# Patient Record
Sex: MALE | Race: WHITE | NOT HISPANIC OR LATINO | Employment: FULL TIME | URBAN - METROPOLITAN AREA
[De-identification: names, ages, dates, MRNs, and addresses within clinical notes are randomized per-mention and may not be internally consistent; named-entity substitution may affect disease eponyms.]

---

## 2024-02-18 RX ORDER — MELOXICAM 15 MG/1
15 TABLET ORAL
Qty: 20 TABLET | Refills: 0 | OUTPATIENT
Start: 2024-02-18

## 2024-02-18 RX ORDER — METHOCARBAMOL 750 MG/1
750 TABLET, FILM COATED ORAL 2 TIMES DAILY PRN
Qty: 30 TABLET | Refills: 0 | OUTPATIENT
Start: 2024-02-18

## 2024-06-09 RX ORDER — GABAPENTIN 300 MG/1
300 CAPSULE ORAL 3 TIMES DAILY
Qty: 90 CAPSULE | Refills: 0 | Status: SHIPPED | OUTPATIENT
Start: 2024-06-09

## 2024-08-07 ENCOUNTER — ANESTHESIA EVENT (OUTPATIENT)
Dept: SURGERY | Facility: HOSPITAL | Age: 59
End: 2024-08-07
Payer: COMMERCIAL

## 2024-08-07 RX ORDER — QUETIAPINE FUMARATE 300 MG/1
300 TABLET, FILM COATED ORAL NIGHTLY
COMMUNITY

## 2024-08-07 RX ORDER — ATORVASTATIN CALCIUM 40 MG/1
1 TABLET, FILM COATED ORAL NIGHTLY
COMMUNITY
Start: 2024-05-02 | End: 2025-05-02

## 2024-08-07 RX ORDER — DIAZEPAM 10 MG/1
10 TABLET ORAL
COMMUNITY
Start: 2024-05-06

## 2024-08-07 RX ORDER — VENLAFAXINE HYDROCHLORIDE 150 MG/1
300 CAPSULE, EXTENDED RELEASE ORAL DAILY
COMMUNITY
Start: 2023-12-07

## 2024-08-07 RX ORDER — CYCLOBENZAPRINE HCL 10 MG
10 TABLET ORAL DAILY
COMMUNITY
Start: 2024-06-04

## 2024-08-07 RX ORDER — TAMSULOSIN HYDROCHLORIDE 0.4 MG/1
1 CAPSULE ORAL EVERY MORNING
COMMUNITY
Start: 2024-02-20

## 2024-08-07 RX ORDER — LISINOPRIL AND HYDROCHLOROTHIAZIDE 20; 25 MG/1; MG/1
1 TABLET ORAL DAILY
COMMUNITY
Start: 2024-02-20

## 2024-08-07 RX ORDER — TRAMADOL HYDROCHLORIDE 50 MG/1
50 TABLET ORAL
COMMUNITY
Start: 2024-06-17

## 2024-08-09 ENCOUNTER — HOSPITAL ENCOUNTER (OUTPATIENT)
Dept: RADIOLOGY | Facility: HOSPITAL | Age: 59
Discharge: HOME | End: 2024-08-09
Attending: NEUROLOGICAL SURGERY
Payer: COMMERCIAL

## 2024-08-09 DIAGNOSIS — Z01.818 PRE-OP EXAMINATION: ICD-10-CM

## 2024-08-09 PROCEDURE — 71046 X-RAY EXAM CHEST 2 VIEWS: CPT | Mod: TC

## 2024-08-14 ENCOUNTER — ANESTHESIA (OUTPATIENT)
Dept: SURGERY | Facility: HOSPITAL | Age: 59
End: 2024-08-14
Payer: COMMERCIAL

## 2024-08-16 NOTE — PRE-PROCEDURE INSTRUCTIONS
[facility]: Outpatient Surgery   Preprocedure Check-In Instructions       Your arrival time for your surgery or procedure is 7:30am.     We ask patients to arrive about 2 hours before surgery to allow for enough time to review your health history & medications, start your IV, complete any outstanding labwork or tests, and meet your Anesthesiologist.     You will arrive at [facility], [address] [place] [address]. Enter through the [place] entrance next to the Emergency Room, and come to the 6th floor to the Outpatient Surgery Department. If you need a wheelchair, please call [phone] for an attendant to meet you at the [place] entrance with a wheelchair.    Wait Times:  Due to inconsistent procedure completion times, an unexpected wait may occur.  The physicians would like you to be here in the event they run ahead of time.  We will keep you comfortable and informed while you are waiting.  We apologize in advance if this happens.    Visitory Policy:   You are allowed 2 adult visitors to be with you in the hospital. All hospital visitors should be in good current health. No small children.     What to Bring:   Please have your ID, insurance cards, and all home medication bottles with you at check in. Bring your CPAP machine if one is used at home.     Fasting:   Nothing to eat or drink after midnight the night before your procedure. This includes no ice, gum, hard candies, and/or tobacco products.   Follow your doctor's instructions for taking any medications on the morning of your procedure. If no instructions for taking medications were given, do not take any medications but bring your medications in their bottles to your procedure check in.     Follow your doctor's preoperative instructions regarding skin prep, bowel prep, bathing, or showering prior to your procedure. If any special soaps were provided to you, please use according to your doctor's instructions.  If no instructions were given from your doctor, take a good bath or shower with antibacterial soap the night before and the morning of your procedure. On the morning of procedure, wear loose, comfortable clothing. No lotions, makeup, perfumes, colognes, deodorant, or jewelry to your procedure. Removable items (glasses, contact lenses, dentures, retainers, hearing aids) need to be removed for your procedure. Bring your storage containers for these items if you wear them.     Artificial nails, body jewelry, eyelash extensions, and/or hair extensions with metal clips are not allowed during your surgery. If you currently wear any of these items, please arrange for them to be removed prior to your arrival to the hospital.     Outpatient or Same Day Surgeries:   Any patients receiving sedation/anesthesia are advised not to drive for 24 hours after their procedure. We do not allow patients to drive themselves home after discharge. If you are going home after your procedure, please have someone available to drive you home from the hospital.     You may call the Outpatient Surgery Department at [phone] with any questions or concerns. We are looking forward to meeting you and taking great care of you for your procedure. Thank you for choosing [facility] for your surgical needs.       Status: complete  Spoke with: patient  Call Time: 1058

## 2024-08-19 ENCOUNTER — HOSPITAL ENCOUNTER (INPATIENT)
Facility: HOSPITAL | Age: 59
LOS: 16 days | Discharge: NURSING, REHAB OR LONG-TERM CARE | DRG: 455 | End: 2024-09-04
Attending: NEUROLOGICAL SURGERY | Admitting: NEUROLOGICAL SURGERY
Payer: COMMERCIAL

## 2024-08-19 DIAGNOSIS — M47.27 LUMBOSACRAL RADICULOPATHY DUE TO DEGENERATIVE JOINT DISEASE OF SPINE: Primary | ICD-10-CM

## 2024-08-19 DIAGNOSIS — M54.18 RADICULOPATHY OF SACRAL REGION: ICD-10-CM

## 2024-08-19 PROBLEM — M51.16 NEURITIS OR RADICULITIS DUE TO RUPTURE OF LUMBAR INTERVERTEBRAL DISC: Status: ACTIVE | Noted: 2024-08-19

## 2024-08-19 PROBLEM — M47.26 OTHER SPONDYLOSIS WITH RADICULOPATHY, LUMBAR REGION: Status: ACTIVE | Noted: 2024-08-19

## 2024-08-19 LAB
ABORH RETYPE: NORMAL
BASOPHILS # BLD AUTO: 0.02 X10(3)/MCL
BASOPHILS NFR BLD AUTO: 0.3 %
EOSINOPHIL # BLD AUTO: 0.09 X10(3)/MCL (ref 0–0.9)
EOSINOPHIL NFR BLD AUTO: 1.5 %
ERYTHROCYTE [DISTWIDTH] IN BLOOD BY AUTOMATED COUNT: 11.9 % (ref 11.5–17)
GROUP & RH: NORMAL
HCT VFR BLD AUTO: 42.3 % (ref 42–52)
HGB BLD-MCNC: 14.9 G/DL (ref 14–18)
IMM GRANULOCYTES # BLD AUTO: 0.03 X10(3)/MCL (ref 0–0.04)
IMM GRANULOCYTES NFR BLD AUTO: 0.5 %
INDIRECT COOMBS: NORMAL
LYMPHOCYTES # BLD AUTO: 1.17 X10(3)/MCL (ref 0.6–4.6)
LYMPHOCYTES NFR BLD AUTO: 18.9 %
MCH RBC QN AUTO: 30.9 PG (ref 27–31)
MCHC RBC AUTO-ENTMCNC: 35.2 G/DL (ref 33–36)
MCV RBC AUTO: 87.8 FL (ref 80–94)
MONOCYTES # BLD AUTO: 0.64 X10(3)/MCL (ref 0.1–1.3)
MONOCYTES NFR BLD AUTO: 10.3 %
NEUTROPHILS # BLD AUTO: 4.25 X10(3)/MCL (ref 2.1–9.2)
NEUTROPHILS NFR BLD AUTO: 68.5 %
NRBC BLD AUTO-RTO: 0 %
PLATELET # BLD AUTO: 197 X10(3)/MCL (ref 130–400)
PMV BLD AUTO: 9.5 FL (ref 7.4–10.4)
RBC # BLD AUTO: 4.82 X10(6)/MCL (ref 4.7–6.1)
SPECIMEN OUTDATE: NORMAL
WBC # BLD AUTO: 6.2 X10(3)/MCL (ref 4.5–11.5)

## 2024-08-19 PROCEDURE — 27201423 OPTIME MED/SURG SUP & DEVICES STERILE SUPPLY: Performed by: NEUROLOGICAL SURGERY

## 2024-08-19 PROCEDURE — 71000033 HC RECOVERY, INTIAL HOUR: Performed by: NEUROLOGICAL SURGERY

## 2024-08-19 PROCEDURE — 25000003 PHARM REV CODE 250: Performed by: NURSE ANESTHETIST, CERTIFIED REGISTERED

## 2024-08-19 PROCEDURE — 37000008 HC ANESTHESIA 1ST 15 MINUTES: Performed by: NEUROLOGICAL SURGERY

## 2024-08-19 PROCEDURE — 36000712 HC OR TIME LEV V 1ST 15 MIN: Performed by: NEUROLOGICAL SURGERY

## 2024-08-19 PROCEDURE — 01NB0ZZ RELEASE LUMBAR NERVE, OPEN APPROACH: ICD-10-PCS | Performed by: NEUROLOGICAL SURGERY

## 2024-08-19 PROCEDURE — 86901 BLOOD TYPING SEROLOGIC RH(D): CPT | Performed by: NEUROLOGICAL SURGERY

## 2024-08-19 PROCEDURE — 11000001 HC ACUTE MED/SURG PRIVATE ROOM

## 2024-08-19 PROCEDURE — 27000221 HC OXYGEN, UP TO 24 HOURS

## 2024-08-19 PROCEDURE — 63600175 PHARM REV CODE 636 W HCPCS: Performed by: ANESTHESIOLOGY

## 2024-08-19 PROCEDURE — 63600175 PHARM REV CODE 636 W HCPCS: Performed by: NEUROLOGICAL SURGERY

## 2024-08-19 PROCEDURE — 25000003 PHARM REV CODE 250

## 2024-08-19 PROCEDURE — C1713 ANCHOR/SCREW BN/BN,TIS/BN: HCPCS | Performed by: NEUROLOGICAL SURGERY

## 2024-08-19 PROCEDURE — 86850 RBC ANTIBODY SCREEN: CPT | Performed by: NEUROLOGICAL SURGERY

## 2024-08-19 PROCEDURE — 63600175 PHARM REV CODE 636 W HCPCS: Performed by: NURSE ANESTHETIST, CERTIFIED REGISTERED

## 2024-08-19 PROCEDURE — 36000713 HC OR TIME LEV V EA ADD 15 MIN: Performed by: NEUROLOGICAL SURGERY

## 2024-08-19 PROCEDURE — 25000003 PHARM REV CODE 250: Performed by: NEUROLOGICAL SURGERY

## 2024-08-19 PROCEDURE — 36415 COLL VENOUS BLD VENIPUNCTURE: CPT | Performed by: NEUROLOGICAL SURGERY

## 2024-08-19 PROCEDURE — 63600175 PHARM REV CODE 636 W HCPCS

## 2024-08-19 PROCEDURE — 0SG30AJ FUSION OF LUMBOSACRAL JOINT WITH INTERBODY FUSION DEVICE, POSTERIOR APPROACH, ANTERIOR COLUMN, OPEN APPROACH: ICD-10-PCS | Performed by: NEUROLOGICAL SURGERY

## 2024-08-19 PROCEDURE — 0SG3071 FUSION OF LUMBOSACRAL JOINT WITH AUTOLOGOUS TISSUE SUBSTITUTE, POSTERIOR APPROACH, POSTERIOR COLUMN, OPEN APPROACH: ICD-10-PCS | Performed by: NEUROLOGICAL SURGERY

## 2024-08-19 PROCEDURE — 0SG10AJ FUSION OF 2 OR MORE LUMBAR VERTEBRAL JOINTS WITH INTERBODY FUSION DEVICE, POSTERIOR APPROACH, ANTERIOR COLUMN, OPEN APPROACH: ICD-10-PCS | Performed by: NEUROLOGICAL SURGERY

## 2024-08-19 PROCEDURE — 71000039 HC RECOVERY, EACH ADD'L HOUR: Performed by: NEUROLOGICAL SURGERY

## 2024-08-19 PROCEDURE — 85025 COMPLETE CBC W/AUTO DIFF WBC: CPT | Performed by: NEUROLOGICAL SURGERY

## 2024-08-19 PROCEDURE — 0SG1071 FUSION OF 2 OR MORE LUMBAR VERTEBRAL JOINTS WITH AUTOLOGOUS TISSUE SUBSTITUTE, POSTERIOR APPROACH, POSTERIOR COLUMN, OPEN APPROACH: ICD-10-PCS | Performed by: NEUROLOGICAL SURGERY

## 2024-08-19 PROCEDURE — 37000009 HC ANESTHESIA EA ADD 15 MINS: Performed by: NEUROLOGICAL SURGERY

## 2024-08-19 PROCEDURE — P9047 ALBUMIN (HUMAN), 25%, 50ML: HCPCS | Mod: JZ,JG | Performed by: NURSE ANESTHETIST, CERTIFIED REGISTERED

## 2024-08-19 DEVICE — FILLER BONE SYN 1CC PARTIC: Type: IMPLANTABLE DEVICE | Site: SPINE LUMBAR | Status: FUNCTIONAL

## 2024-08-19 RX ORDER — ALUMINUM HYDROXIDE, MAGNESIUM HYDROXIDE, AND SIMETHICONE 1200; 120; 1200 MG/30ML; MG/30ML; MG/30ML
30 SUSPENSION ORAL EVERY 4 HOURS PRN
Status: DISCONTINUED | OUTPATIENT
Start: 2024-08-19 | End: 2024-09-04 | Stop reason: HOSPADM

## 2024-08-19 RX ORDER — HYDROCODONE BITARTRATE AND ACETAMINOPHEN 10; 325 MG/1; MG/1
1 TABLET ORAL EVERY 4 HOURS PRN
Status: DISCONTINUED | OUTPATIENT
Start: 2024-08-19 | End: 2024-08-23

## 2024-08-19 RX ORDER — ATORVASTATIN CALCIUM 40 MG/1
40 TABLET, FILM COATED ORAL NIGHTLY
Status: DISCONTINUED | OUTPATIENT
Start: 2024-08-19 | End: 2024-09-04 | Stop reason: HOSPADM

## 2024-08-19 RX ORDER — DIAZEPAM 5 MG/1
10 TABLET ORAL
Status: DISCONTINUED | OUTPATIENT
Start: 2024-08-19 | End: 2024-08-22

## 2024-08-19 RX ORDER — MORPHINE SULFATE 4 MG/ML
4 INJECTION, SOLUTION INTRAMUSCULAR; INTRAVENOUS
Status: DISCONTINUED | OUTPATIENT
Start: 2024-08-19 | End: 2024-08-20

## 2024-08-19 RX ORDER — CALCIUM CARBONATE 200(500)MG
500 TABLET,CHEWABLE ORAL DAILY PRN
Status: DISCONTINUED | OUTPATIENT
Start: 2024-08-19 | End: 2024-09-04 | Stop reason: HOSPADM

## 2024-08-19 RX ORDER — BISACODYL 10 MG/1
10 SUPPOSITORY RECTAL DAILY
Status: DISCONTINUED | OUTPATIENT
Start: 2024-08-19 | End: 2024-09-04 | Stop reason: HOSPADM

## 2024-08-19 RX ORDER — PROPOFOL 10 MG/ML
VIAL (ML) INTRAVENOUS
Status: DISCONTINUED | OUTPATIENT
Start: 2024-08-19 | End: 2024-08-19

## 2024-08-19 RX ORDER — PROPOFOL 10 MG/ML
VIAL (ML) INTRAVENOUS CONTINUOUS PRN
Status: DISCONTINUED | OUTPATIENT
Start: 2024-08-19 | End: 2024-08-19

## 2024-08-19 RX ORDER — EPHEDRINE SULFATE 50 MG/ML
INJECTION, SOLUTION INTRAVENOUS
Status: DISCONTINUED | OUTPATIENT
Start: 2024-08-19 | End: 2024-08-19

## 2024-08-19 RX ORDER — VENLAFAXINE HYDROCHLORIDE 150 MG/1
300 CAPSULE, EXTENDED RELEASE ORAL DAILY
Status: DISCONTINUED | OUTPATIENT
Start: 2024-08-19 | End: 2024-09-04 | Stop reason: HOSPADM

## 2024-08-19 RX ORDER — LISINOPRIL AND HYDROCHLOROTHIAZIDE 20; 25 MG/1; MG/1
1 TABLET ORAL DAILY
Status: DISCONTINUED | OUTPATIENT
Start: 2024-08-19 | End: 2024-08-19

## 2024-08-19 RX ORDER — ACETAMINOPHEN 325 MG/1
650 TABLET ORAL EVERY 4 HOURS PRN
Status: DISCONTINUED | OUTPATIENT
Start: 2024-08-19 | End: 2024-09-04 | Stop reason: HOSPADM

## 2024-08-19 RX ORDER — ACETAMINOPHEN 325 MG/1
650 TABLET ORAL EVERY 6 HOURS PRN
Status: DISCONTINUED | OUTPATIENT
Start: 2024-08-19 | End: 2024-09-04 | Stop reason: HOSPADM

## 2024-08-19 RX ORDER — MUPIROCIN 20 MG/G
OINTMENT TOPICAL 2 TIMES DAILY
Status: DISPENSED | OUTPATIENT
Start: 2024-08-19 | End: 2024-08-21

## 2024-08-19 RX ORDER — CYCLOBENZAPRINE HCL 10 MG
10 TABLET ORAL 3 TIMES DAILY PRN
Status: DISCONTINUED | OUTPATIENT
Start: 2024-08-19 | End: 2024-09-04 | Stop reason: HOSPADM

## 2024-08-19 RX ORDER — QUETIAPINE FUMARATE 300 MG/1
300 TABLET, FILM COATED ORAL NIGHTLY
Status: DISCONTINUED | OUTPATIENT
Start: 2024-08-19 | End: 2024-09-04 | Stop reason: HOSPADM

## 2024-08-19 RX ORDER — ONDANSETRON HYDROCHLORIDE 2 MG/ML
INJECTION, SOLUTION INTRAVENOUS
Status: DISCONTINUED | OUTPATIENT
Start: 2024-08-19 | End: 2024-08-19

## 2024-08-19 RX ORDER — PROCHLORPERAZINE EDISYLATE 5 MG/ML
10 INJECTION INTRAMUSCULAR; INTRAVENOUS EVERY 6 HOURS PRN
Status: DISCONTINUED | OUTPATIENT
Start: 2024-08-19 | End: 2024-09-04 | Stop reason: HOSPADM

## 2024-08-19 RX ORDER — MORPHINE SULFATE 4 MG/ML
2 INJECTION, SOLUTION INTRAMUSCULAR; INTRAVENOUS
Status: DISCONTINUED | OUTPATIENT
Start: 2024-08-19 | End: 2024-08-22

## 2024-08-19 RX ORDER — ENOXAPARIN SODIUM 100 MG/ML
40 INJECTION SUBCUTANEOUS EVERY 24 HOURS
Status: DISCONTINUED | OUTPATIENT
Start: 2024-08-21 | End: 2024-09-04 | Stop reason: HOSPADM

## 2024-08-19 RX ORDER — DEXAMETHASONE SODIUM PHOSPHATE 4 MG/ML
INJECTION, SOLUTION INTRA-ARTICULAR; INTRALESIONAL; INTRAMUSCULAR; INTRAVENOUS; SOFT TISSUE
Status: DISCONTINUED | OUTPATIENT
Start: 2024-08-19 | End: 2024-08-19

## 2024-08-19 RX ORDER — METHOCARBAMOL 100 MG/ML
1000 INJECTION, SOLUTION INTRAMUSCULAR; INTRAVENOUS ONCE
Status: COMPLETED | OUTPATIENT
Start: 2024-08-19 | End: 2024-08-19

## 2024-08-19 RX ORDER — HYDROCODONE BITARTRATE AND ACETAMINOPHEN 5; 325 MG/1; MG/1
1 TABLET ORAL EVERY 4 HOURS PRN
Status: DISCONTINUED | OUTPATIENT
Start: 2024-08-19 | End: 2024-08-23

## 2024-08-19 RX ORDER — SODIUM CHLORIDE 9 MG/ML
INJECTION, SOLUTION INTRAVENOUS CONTINUOUS
Status: DISCONTINUED | OUTPATIENT
Start: 2024-08-19 | End: 2024-08-30

## 2024-08-19 RX ORDER — PHENYLEPHRINE HYDROCHLORIDE 10 MG/ML
INJECTION INTRAVENOUS
Status: DISCONTINUED | OUTPATIENT
Start: 2024-08-19 | End: 2024-08-19

## 2024-08-19 RX ORDER — FENTANYL CITRATE 50 UG/ML
INJECTION, SOLUTION INTRAMUSCULAR; INTRAVENOUS
Status: DISCONTINUED | OUTPATIENT
Start: 2024-08-19 | End: 2024-08-19

## 2024-08-19 RX ORDER — AMOXICILLIN 250 MG
2 CAPSULE ORAL 2 TIMES DAILY
Status: DISCONTINUED | OUTPATIENT
Start: 2024-08-19 | End: 2024-09-04 | Stop reason: HOSPADM

## 2024-08-19 RX ORDER — KETAMINE HYDROCHLORIDE 100 MG/ML
INJECTION, SOLUTION INTRAMUSCULAR; INTRAVENOUS
Status: DISCONTINUED | OUTPATIENT
Start: 2024-08-19 | End: 2024-08-19

## 2024-08-19 RX ORDER — HYDROCHLOROTHIAZIDE 25 MG/1
25 TABLET ORAL DAILY
Status: DISCONTINUED | OUTPATIENT
Start: 2024-08-19 | End: 2024-08-29

## 2024-08-19 RX ORDER — OXYCODONE AND ACETAMINOPHEN 10; 325 MG/1; MG/1
1 TABLET ORAL EVERY 4 HOURS PRN
Status: DISCONTINUED | OUTPATIENT
Start: 2024-08-19 | End: 2024-09-04 | Stop reason: HOSPADM

## 2024-08-19 RX ORDER — EPINEPHRINE 1 MG/ML
INJECTION, SOLUTION, CONCENTRATE INTRAVENOUS
Status: DISCONTINUED | OUTPATIENT
Start: 2024-08-19 | End: 2024-08-19

## 2024-08-19 RX ORDER — MIDAZOLAM HYDROCHLORIDE 1 MG/ML
INJECTION INTRAMUSCULAR; INTRAVENOUS
Status: DISCONTINUED | OUTPATIENT
Start: 2024-08-19 | End: 2024-08-19

## 2024-08-19 RX ORDER — ONDANSETRON 4 MG/1
4 TABLET, ORALLY DISINTEGRATING ORAL EVERY 6 HOURS PRN
Status: DISCONTINUED | OUTPATIENT
Start: 2024-08-19 | End: 2024-09-04 | Stop reason: HOSPADM

## 2024-08-19 RX ORDER — ONDANSETRON HYDROCHLORIDE 2 MG/ML
4 INJECTION, SOLUTION INTRAVENOUS DAILY PRN
Status: DISCONTINUED | OUTPATIENT
Start: 2024-08-19 | End: 2024-08-19 | Stop reason: HOSPADM

## 2024-08-19 RX ORDER — CEFAZOLIN SODIUM 2 G/50ML
2 SOLUTION INTRAVENOUS
Status: COMPLETED | OUTPATIENT
Start: 2024-08-19 | End: 2024-08-20

## 2024-08-19 RX ORDER — CEFAZOLIN SODIUM 2 G/50ML
2 SOLUTION INTRAVENOUS
Status: DISCONTINUED | OUTPATIENT
Start: 2024-08-19 | End: 2024-08-21

## 2024-08-19 RX ORDER — TAMSULOSIN HYDROCHLORIDE 0.4 MG/1
1 CAPSULE ORAL EVERY MORNING
Status: DISCONTINUED | OUTPATIENT
Start: 2024-08-19 | End: 2024-09-04 | Stop reason: HOSPADM

## 2024-08-19 RX ORDER — ACETAMINOPHEN 10 MG/ML
INJECTION, SOLUTION INTRAVENOUS
Status: DISCONTINUED | OUTPATIENT
Start: 2024-08-19 | End: 2024-08-19

## 2024-08-19 RX ORDER — HYDROMORPHONE HYDROCHLORIDE 2 MG/ML
0.5 INJECTION, SOLUTION INTRAMUSCULAR; INTRAVENOUS; SUBCUTANEOUS EVERY 5 MIN PRN
Status: DISCONTINUED | OUTPATIENT
Start: 2024-08-19 | End: 2024-08-19 | Stop reason: HOSPADM

## 2024-08-19 RX ORDER — METOPROLOL SUCCINATE 50 MG/1
50 TABLET, EXTENDED RELEASE ORAL DAILY
COMMUNITY

## 2024-08-19 RX ORDER — MORPHINE SULFATE 4 MG/ML
1 INJECTION, SOLUTION INTRAMUSCULAR; INTRAVENOUS
Status: DISCONTINUED | OUTPATIENT
Start: 2024-08-19 | End: 2024-08-22

## 2024-08-19 RX ORDER — LIDOCAINE HYDROCHLORIDE AND EPINEPHRINE 5; 5 MG/ML; UG/ML
INJECTION, SOLUTION INFILTRATION; PERINEURAL
Status: DISCONTINUED | OUTPATIENT
Start: 2024-08-19 | End: 2024-08-19 | Stop reason: HOSPADM

## 2024-08-19 RX ORDER — ROCURONIUM BROMIDE 10 MG/ML
INJECTION, SOLUTION INTRAVENOUS
Status: DISCONTINUED | OUTPATIENT
Start: 2024-08-19 | End: 2024-08-19

## 2024-08-19 RX ORDER — SODIUM CHLORIDE 0.9 % (FLUSH) 0.9 %
10 SYRINGE (ML) INJECTION
Status: DISCONTINUED | OUTPATIENT
Start: 2024-08-19 | End: 2024-08-19 | Stop reason: HOSPADM

## 2024-08-19 RX ORDER — GLUCAGON 1 MG
1 KIT INJECTION
Status: DISCONTINUED | OUTPATIENT
Start: 2024-08-19 | End: 2024-08-19 | Stop reason: HOSPADM

## 2024-08-19 RX ORDER — ALBUMIN HUMAN 250 G/1000ML
SOLUTION INTRAVENOUS
Status: DISCONTINUED | OUTPATIENT
Start: 2024-08-19 | End: 2024-08-19

## 2024-08-19 RX ORDER — LISINOPRIL 20 MG/1
20 TABLET ORAL DAILY
Status: DISCONTINUED | OUTPATIENT
Start: 2024-08-19 | End: 2024-08-29

## 2024-08-19 RX ADMIN — EPHEDRINE SULFATE 10 MG: 50 INJECTION INTRAVENOUS at 10:08

## 2024-08-19 RX ADMIN — DEXAMETHASONE SODIUM PHOSPHATE 4 MG: 4 INJECTION, SOLUTION INTRA-ARTICULAR; INTRALESIONAL; INTRAMUSCULAR; INTRAVENOUS; SOFT TISSUE at 10:08

## 2024-08-19 RX ADMIN — EPINEPHRINE 20 MCG: 1 INJECTION, SOLUTION, CONCENTRATE INTRAVENOUS at 11:08

## 2024-08-19 RX ADMIN — QUETIAPINE FUMARATE 300 MG: 300 TABLET ORAL at 08:08

## 2024-08-19 RX ADMIN — HYDROMORPHONE HYDROCHLORIDE 0.5 MG: 2 INJECTION, SOLUTION INTRAMUSCULAR; INTRAVENOUS; SUBCUTANEOUS at 03:08

## 2024-08-19 RX ADMIN — HYDROCODONE BITARTRATE AND ACETAMINOPHEN 1 TABLET: 10; 325 TABLET ORAL at 04:08

## 2024-08-19 RX ADMIN — EPINEPHRINE 10 MCG: 1 INJECTION, SOLUTION, CONCENTRATE INTRAVENOUS at 12:08

## 2024-08-19 RX ADMIN — PHENYLEPHRINE HYDROCHLORIDE 100 MCG: 10 INJECTION INTRAVENOUS at 10:08

## 2024-08-19 RX ADMIN — SUGAMMADEX 100 MG: 100 INJECTION, SOLUTION INTRAVENOUS at 02:08

## 2024-08-19 RX ADMIN — PHENYLEPHRINE HYDROCHLORIDE 100 MCG: 10 INJECTION INTRAVENOUS at 02:08

## 2024-08-19 RX ADMIN — SODIUM CHLORIDE, SODIUM GLUCONATE, SODIUM ACETATE, POTASSIUM CHLORIDE AND MAGNESIUM CHLORIDE: 526; 502; 368; 37; 30 INJECTION, SOLUTION INTRAVENOUS at 09:08

## 2024-08-19 RX ADMIN — REMIFENTANIL HYDROCHLORIDE 0.1 MCG/KG/MIN: 1 INJECTION, POWDER, LYOPHILIZED, FOR SOLUTION INTRAVENOUS at 10:08

## 2024-08-19 RX ADMIN — EPHEDRINE SULFATE 5 MG: 50 INJECTION INTRAVENOUS at 02:08

## 2024-08-19 RX ADMIN — ROCURONIUM BROMIDE 20 MG: 10 SOLUTION INTRAVENOUS at 10:08

## 2024-08-19 RX ADMIN — EPINEPHRINE 10 MCG: 1 INJECTION, SOLUTION, CONCENTRATE INTRAVENOUS at 11:08

## 2024-08-19 RX ADMIN — ALBUMIN (HUMAN) 100 ML: 12.5 SOLUTION INTRAVENOUS at 11:08

## 2024-08-19 RX ADMIN — EPINEPHRINE 10 MCG: 1 INJECTION, SOLUTION, CONCENTRATE INTRAVENOUS at 02:08

## 2024-08-19 RX ADMIN — PROPOFOL 80 MCG/KG/MIN: 10 INJECTION, EMULSION INTRAVENOUS at 12:08

## 2024-08-19 RX ADMIN — HYDROCODONE BITARTRATE AND ACETAMINOPHEN 1 TABLET: 10; 325 TABLET ORAL at 09:08

## 2024-08-19 RX ADMIN — FENTANYL CITRATE 50 MCG: 50 INJECTION, SOLUTION INTRAMUSCULAR; INTRAVENOUS at 02:08

## 2024-08-19 RX ADMIN — PHENYLEPHRINE HYDROCHLORIDE 25 MCG/MIN: 10 INJECTION INTRAVENOUS at 10:08

## 2024-08-19 RX ADMIN — MORPHINE SULFATE 4 MG: 4 INJECTION, SOLUTION INTRAMUSCULAR; INTRAVENOUS at 11:08

## 2024-08-19 RX ADMIN — PROPOFOL 100 MCG/KG/MIN: 10 INJECTION, EMULSION INTRAVENOUS at 02:08

## 2024-08-19 RX ADMIN — KETAMINE HYDROCHLORIDE 25 MG: 100 INJECTION, SOLUTION, CONCENTRATE INTRAMUSCULAR; INTRAVENOUS at 10:08

## 2024-08-19 RX ADMIN — PHENYLEPHRINE HYDROCHLORIDE 200 MCG: 10 INJECTION INTRAVENOUS at 10:08

## 2024-08-19 RX ADMIN — EPINEPHRINE 20 MCG: 1 INJECTION, SOLUTION, CONCENTRATE INTRAVENOUS at 02:08

## 2024-08-19 RX ADMIN — MUPIROCIN: 20 OINTMENT TOPICAL at 08:08

## 2024-08-19 RX ADMIN — ONDANSETRON 4 MG: 4 TABLET, ORALLY DISINTEGRATING ORAL at 04:08

## 2024-08-19 RX ADMIN — EPHEDRINE SULFATE 10 MG: 50 INJECTION INTRAVENOUS at 02:08

## 2024-08-19 RX ADMIN — SODIUM CHLORIDE, SODIUM GLUCONATE, SODIUM ACETATE, POTASSIUM CHLORIDE AND MAGNESIUM CHLORIDE: 526; 502; 368; 37; 30 INJECTION, SOLUTION INTRAVENOUS at 02:08

## 2024-08-19 RX ADMIN — Medication 20 MG: at 10:08

## 2024-08-19 RX ADMIN — PROPOFOL 100 MCG/KG/MIN: 10 INJECTION, EMULSION INTRAVENOUS at 10:08

## 2024-08-19 RX ADMIN — ROCURONIUM BROMIDE 30 MG: 10 SOLUTION INTRAVENOUS at 10:08

## 2024-08-19 RX ADMIN — EPHEDRINE SULFATE 5 MG: 50 INJECTION INTRAVENOUS at 01:08

## 2024-08-19 RX ADMIN — FENTANYL CITRATE 100 MCG: 50 INJECTION, SOLUTION INTRAMUSCULAR; INTRAVENOUS at 10:08

## 2024-08-19 RX ADMIN — EPINEPHRINE 30 MCG: 1 INJECTION, SOLUTION, CONCENTRATE INTRAVENOUS at 11:08

## 2024-08-19 RX ADMIN — EPHEDRINE SULFATE 5 MG: 50 INJECTION INTRAVENOUS at 11:08

## 2024-08-19 RX ADMIN — ONDANSETRON 4 MG: 2 INJECTION INTRAMUSCULAR; INTRAVENOUS at 02:08

## 2024-08-19 RX ADMIN — CEFAZOLIN SODIUM 2 G: 2 SOLUTION INTRAVENOUS at 10:08

## 2024-08-19 RX ADMIN — ATORVASTATIN CALCIUM 40 MG: 40 TABLET, FILM COATED ORAL at 08:08

## 2024-08-19 RX ADMIN — ACETAMINOPHEN 1000 MG: 10 INJECTION, SOLUTION INTRAVENOUS at 11:08

## 2024-08-19 RX ADMIN — METHOCARBAMOL 1000 MG: 100 INJECTION, SOLUTION INTRAMUSCULAR; INTRAVENOUS at 03:08

## 2024-08-19 RX ADMIN — EPINEPHRINE 0.02 MCG/KG/MIN: 1 INJECTION INTRAMUSCULAR; INTRAVENOUS; SUBCUTANEOUS at 11:08

## 2024-08-19 RX ADMIN — MIDAZOLAM HYDROCHLORIDE 2 MG: 1 INJECTION, SOLUTION INTRAMUSCULAR; INTRAVENOUS at 09:08

## 2024-08-19 RX ADMIN — HYDROMORPHONE HYDROCHLORIDE 0.1 MG: 2 INJECTION, SOLUTION INTRAMUSCULAR; INTRAVENOUS; SUBCUTANEOUS at 03:08

## 2024-08-19 RX ADMIN — CYCLOBENZAPRINE 10 MG: 10 TABLET, FILM COATED ORAL at 09:08

## 2024-08-19 RX ADMIN — EPHEDRINE SULFATE 5 MG: 50 INJECTION INTRAVENOUS at 10:08

## 2024-08-19 RX ADMIN — SENNOSIDES AND DOCUSATE SODIUM 2 TABLET: 50; 8.6 TABLET ORAL at 08:08

## 2024-08-19 RX ADMIN — Medication 180 MG: at 10:08

## 2024-08-19 RX ADMIN — EPHEDRINE SULFATE 5 MG: 50 INJECTION INTRAVENOUS at 12:08

## 2024-08-19 NOTE — ANESTHESIA PREPROCEDURE EVALUATION
08/19/2024  Lionel Nix is a 59 y.o., male with obesity, htn, body habitus suspicious for sleep apnea, but pt denies sleep apnea and other medical problems noted in the EMR      Pre-op Assessment    I have reviewed the Patient Summary Reports.     I have reviewed the Nursing Notes. I have reviewed the NPO Status.   I have reviewed the Medications.     Review of Systems  Anesthesia Hx:  No problems with previous Anesthesia                Cardiovascular:  Exercise tolerance: good                                               Physical Exam  General: Well nourished and Cooperative    Airway:  Mallampati: II   Mouth Opening: Normal  TM Distance: Normal  Tongue: Normal  Neck ROM: Normal ROM  Neck: Girth Increased    Dental:  Intact    Chest/Lungs:  Clear to auscultation    Heart:  Rhythm: Regular Rhythm        Anesthesia Plan  Type of Anesthesia, risks & benefits discussed:    Anesthesia Type: Gen ETT  Intra-op Monitoring Plan: Standard ASA Monitors  Post Op Pain Control Plan: multimodal analgesia  Induction:  IV  Informed Consent: Informed consent signed with the Patient and all parties understand the risks and agree with anesthesia plan.  All questions answered.   ASA Score: 3  Day of Surgery Review of History & Physical: H&P Update referred to the surgeon/provider.    Ready For Surgery From Anesthesia Perspective.     .  I explained anesthesia plan to patient/responsbile party if available.  Anesthesia consent done going over the material facts, risks, complications & alternatives, obtained which includes the possibility of altering the anesthesia plan.  I reviewed problem list, prior to admission medication list, appropriate labs, any workup, Xray, EKG etc noted below.  Patients condition is satisfactory to proceed with anesthesia plan unless otherwise noted (see anesthesia chart for details of the anesthesia  "plan carried out).      Pre-operative evaluation for Procedure(s) (LRB):  FUSION, SPINE, LUMBAR, TLIF, USING COMPUTER-ASSISTED NAVIGATION (N/A)    BP (!) 143/99   Pulse 79   Temp 36.7 °C (98 °F) (Oral)   Resp 20   Ht 5' 8" (1.727 m)   Wt 104.3 kg (230 lb)   SpO2 96%   BMI 34.97 kg/m²     Patient Active Problem List   Diagnosis    Neuritis or radiculitis due to rupture of lumbar intervertebral disc    Other spondylosis with radiculopathy, lumbar region    Lumbosacral radiculopathy due to degenerative joint disease of spine       Review of patient's allergies indicates:  No Known Allergies    Current Outpatient Medications   Medication Instructions    atorvastatin (LIPITOR) 40 MG tablet 1 tablet, Oral, Nightly    cyclobenzaprine (FLEXERIL) 10 mg, Oral, Daily    diazePAM (VALIUM) 10 mg, Oral, As needed (PRN)    gabapentin (NEURONTIN) 300 mg, Oral, 3 times daily    lisinopriL-hydrochlorothiazide (PRINZIDE,ZESTORETIC) 20-25 mg Tab 1 tablet, Oral, Daily    QUEtiapine (SEROQUEL) 300 mg, Oral, Nightly    tamsulosin (FLOMAX) 0.4 mg Cap 1 capsule, Oral, Every morning    traMADoL (ULTRAM) 50 mg, Oral, As needed (PRN)    venlafaxine (EFFEXOR-XR) 300 mg, Oral, Daily       Past Surgical History:   Procedure Laterality Date    BACK SURGERY      COLONOSCOPY      HERNIA REPAIR      SHOULDER SURGERY Left        Social History     Socioeconomic History    Marital status:    Tobacco Use    Smoking status: Never    Smokeless tobacco: Never   Substance and Sexual Activity    Alcohol use: Never    Drug use: Never     Social Determinants of Health     Financial Resource Strain: Medium Risk (8/9/2024)    Received from [facility] and Its Subsidiaries and Affiliates    Overall Financial Resource Strain (CARDIA)     Difficulty of Paying Living Expenses: Somewhat hard   Food Insecurity: Food Insecurity Present (8/9/2024)    Received from [facility] and " Its Subsidiaries and Affiliates    Hunger Vital Sign     Worried About Running Out of Food in the Last Year: Often true     Ran Out of Food in the Last Year: Often true   Transportation Needs: Unmet Transportation Needs (8/9/2024)    Received from [facility] and Its Subsidiaries and Affiliates    PRAPARE - Transportation     Lack of Transportation (Medical): Yes     Lack of Transportation (Non-Medical): Yes   Physical Activity: Inactive (8/9/2024)    Received from [facility] and Its Subsidiaries and Affiliates    Exercise Vital Sign     Days of Exercise per Week: 0 days     Minutes of Exercise per Session: 0 min   Stress: Stress Concern Present (8/9/2024)    Received from [facility] and Its Subsidiaries and Affiliates    [employer] [place] [employer] - Occupational Stress Questionnaire     Feeling of Stress : To some extent   Housing Stability: High Risk (8/9/2024)    Received from [facility] and Its Subsidiaries and Affiliates    Housing Stability Vital Sign     Unable to Pay for Housing in the Last Year: Yes     Number of Times Moved in the Last Year: 1     Homeless in the Last Year: No       Lab Results   Component Value Date    WBC 6.20 08/19/2024    HGB 14.9 08/19/2024    HCT 42.3 08/19/2024    MCV 87.8 08/19/2024     08/19/2024          BMP  Lab Results   Component Value Date    HCT 42.3 08/19/2024     08/09/2024    K 3.6 08/09/2024    BUN 22.6 08/09/2024    CREATININE 1.23 (H) 08/09/2024    CALCIUM 9.8 08/09/2024                Diagnostic Studies:    .      EKG:  Results for orders placed or performed in visit on 08/09/24   EKG 12-lead    Collection Time: 08/09/24 11:21 AM   Result Value Ref Range    QRS Duration 86 ms    [facility] QTC Calculation 433 ms    Narrative    Test Reason : Z01.818,    Vent. Rate : 080 BPM     Atrial Rate : 080  BPM     P-R Int : 144 ms          QRS Dur : 086 ms      QT Int : 376 ms       P-R-T Axes : 012 072 070 degrees     QTc Int : 433 ms    Normal sinus rhythm  Normal ECG  When compared with ECG of 26-AUG-2004 07:24,  No significant change was found  Confirmed by Thor Orellana MD ([id number]) on 8/9/2024 1:42:22 PM    Referred By:             Confirmed By:Thor Peña MD

## 2024-08-19 NOTE — PROGRESS NOTES
H&P completed on paper has been reviewed, the patient has been examined and:  I concur with the findings and no changes have occurred since H&P was written.    Active Hospital Problems    Diagnosis  POA    *Lumbosacral radiculopathy due to degenerative joint disease of spine [M47.27]  Yes    Neuritis or radiculitis due to rupture of lumbar intervertebral disc [M51.16]  Yes    Other spondylosis with radiculopathy, lumbar region [M47.26]  Yes      Resolved Hospital Problems   No resolved problems to display.

## 2024-08-19 NOTE — ANESTHESIA PROCEDURE NOTES
Intubation    Date/Time: 8/19/2024 10:18 AM    Performed by: Mehran Plata CRNA  Authorized by: Marty Peña MD    Intubation:     Induction:  Intravenous    Intubated:  Postinduction    Mask Ventilation:  Easy with oral airway    Attempts:  1    Attempted By:  Student    Method of Intubation:  Direct    Blade:  Barrow 2    Laryngeal View Grade: Grade I - full view of cords      Difficult Airway Encountered?: No      Complications:  None    Airway Device:  Oral endotracheal tube    Airway Device Size:  8.0    Style/Cuff Inflation:  Cuffed (inflated to minimal occlusive pressure)    Inflation Amount (mL):  6    Tube secured:  23    Secured at:  The lips    Placement Verified By:  Capnometry    Complicating Factors:  None    Findings Post-Intubation:  BS equal bilateral and atraumatic/condition of teeth unchanged

## 2024-08-19 NOTE — PROGRESS NOTES
H&P completed on paper has been reviewed, the patient has been examined and:  I concur with the findings and no changes have occurred since H&P was written.     There are no hospital problems to display for this patient.

## 2024-08-19 NOTE — BRIEF OP NOTE
[facility] - Periop Services  Brief Operative Note    SUMMARY     Surgery Date: 8/19/2024     Surgeons and Role:     * Car August MD - Primary    Assisting Surgeon: Gonzalo Mansfield PA-C    Pre-op Diagnosis:  Neuritis or radiculitis due to rupture of lumbar intervertebral disc [M51.16]  Other spondylosis with radiculopathy, lumbar region [M47.26]  Lumbosacral radiculopathy due to degenerative joint disease of spine [M47.27]    Post-op Diagnosis:  Post-Op Diagnosis Codes:     * Neuritis or radiculitis due to rupture of lumbar intervertebral disc [M51.16]     * Other spondylosis with radiculopathy, lumbar region [M47.26]     * Lumbosacral radiculopathy due to degenerative joint disease of spine [M47.27]    Procedure(s) (LRB):  FUSION, SPINE, LUMBAR, TLIF, USING COMPUTER-ASSISTED NAVIGATION (Left)    L2-S1 decompression, L3/4, L4/5, L5/S1 TLIF using Renovis Ti Cage (L3/4: 22c67wn; L4/5: 75w54od; L5/S1: 56t91ca) and posterior fusion using Darryl screws (Rt L2: 6.5x50; Henok L3: 6.5x50;  Lt L4: 6.5x45;  Henok L5: 6.5x45;  Henok S1: 6.5x40) and osteoamp fibers. O arm assisted.       Anesthesia: General    Implants:  Implant Name Type Inv. Item Serial No.  Lot No. LRB No. Used Action   FILLER BONE SYN 1CC PARTIC - [id number]  FILLER BONE SYN 1CC PARTIC  Selectica [id number] Left 4 Implanted   Osteoamp Select   [id number]  [id number] Left 1 Implanted   Tesera P Cage [id number] Convex 7 deg     [id number] Left 1 Implanted   Tesera P Lumbar Interbody Implant Titanium Alloy     [id number] Left 1 Implanted   OsteoAmp Select Fiber   [id number]   Left 1 Implanted   Osteoamp Select Fibers   [id number]   Left 1 Implanted   Tesera P Lumbar Interbody Implant Titanium Alloy     [id number] Left 1 Implanted   6.5mm x 50mm PA Screw    EP[employer]QUIPMENT  Left 3 Implanted   6.5mm x 45mm PA Screw    EPIC MEDICAL EQUIPMENT  Left 3 Implanted   6.5mm x 40mm PA Screw    EPIC MEDICAL EQUIPMENT  Left 2 Implanted   110mm  Pre Bent Ismael    EPIC MEDICAL EQUIPMENT  Left 1 Implanted   120mm Pre Bent Ismael    EPIC MEDICAL EQUIPMENT  Left 1 Implanted   Set Caps    EPIC MEDICAL EQUIPMENT  Left 8 Implanted       Operative Findings: dictated    Estimated Blood Loss: 500 mL    Estimated Blood Loss has been documented.         Specimens:   Specimen (24h ago, onward)      None            [id number]

## 2024-08-19 NOTE — TRANSFER OF CARE
"Anesthesia Transfer of Care Note    Patient: Lionel Nix    Procedure(s) Performed: Procedure(s) (LRB):  FUSION, SPINE, LUMBAR, TLIF, USING COMPUTER-ASSISTED NAVIGATION (Left)    Patient location: PACU    Anesthesia Type: general    Transport from OR: Transported from OR on room air with adequate spontaneous ventilation    Post pain: adequate analgesia    Post assessment: no apparent anesthetic complications    Post vital signs: stable    Level of consciousness: awake and agitated    Nausea/Vomiting: no nausea/vomiting    Complications: none    Transfer of care protocol was followed      Last vitals: Visit Vitals  /65   Pulse 98   Temp 37 °C (98.6 °F)   Resp 18   Ht 5' 8" (1.727 m)   Wt 104.3 kg (230 lb)   SpO2 100%   BMI 34.97 kg/m²     "

## 2024-08-20 LAB
ANION GAP SERPL CALC-SCNC: 10 MEQ/L
BASOPHILS # BLD AUTO: 0.01 X10(3)/MCL
BASOPHILS NFR BLD AUTO: 0.1 %
BUN SERPL-MCNC: 13.5 MG/DL (ref 8.4–25.7)
CALCIUM SERPL-MCNC: 7.8 MG/DL (ref 8.8–10)
CHLORIDE SERPL-SCNC: 102 MMOL/L (ref 98–107)
CO2 SERPL-SCNC: 25 MMOL/L (ref 23–31)
CREAT SERPL-MCNC: 0.8 MG/DL (ref 0.73–1.18)
CREAT/UREA NIT SERPL: 17
EOSINOPHIL # BLD AUTO: 0 X10(3)/MCL (ref 0–0.9)
EOSINOPHIL NFR BLD AUTO: 0 %
ERYTHROCYTE [DISTWIDTH] IN BLOOD BY AUTOMATED COUNT: 11.9 % (ref 11.5–17)
GFR SERPLBLD CREATININE-BSD FMLA CKD-EPI: >60 ML/MIN/1.73/M2
GLUCOSE SERPL-MCNC: 118 MG/DL (ref 82–115)
HCT VFR BLD AUTO: 27.6 % (ref 42–52)
HGB BLD-MCNC: 9.8 G/DL (ref 14–18)
IMM GRANULOCYTES # BLD AUTO: 0.02 X10(3)/MCL (ref 0–0.04)
IMM GRANULOCYTES NFR BLD AUTO: 0.3 %
LYMPHOCYTES # BLD AUTO: 0.93 X10(3)/MCL (ref 0.6–4.6)
LYMPHOCYTES NFR BLD AUTO: 13.5 %
MCH RBC QN AUTO: 31.7 PG (ref 27–31)
MCHC RBC AUTO-ENTMCNC: 35.5 G/DL (ref 33–36)
MCV RBC AUTO: 89.3 FL (ref 80–94)
MONOCYTES # BLD AUTO: 0.69 X10(3)/MCL (ref 0.1–1.3)
MONOCYTES NFR BLD AUTO: 10 %
NEUTROPHILS # BLD AUTO: 5.26 X10(3)/MCL (ref 2.1–9.2)
NEUTROPHILS NFR BLD AUTO: 76.1 %
NRBC BLD AUTO-RTO: 0 %
PLATELET # BLD AUTO: 148 X10(3)/MCL (ref 130–400)
PMV BLD AUTO: 9.9 FL (ref 7.4–10.4)
POTASSIUM SERPL-SCNC: 3 MMOL/L (ref 3.5–5.1)
RBC # BLD AUTO: 3.09 X10(6)/MCL (ref 4.7–6.1)
SODIUM SERPL-SCNC: 137 MMOL/L (ref 136–145)
WBC # BLD AUTO: 6.91 X10(3)/MCL (ref 4.5–11.5)

## 2024-08-20 PROCEDURE — 99900035 HC TECH TIME PER 15 MIN (STAT)

## 2024-08-20 PROCEDURE — 94760 N-INVAS EAR/PLS OXIMETRY 1: CPT

## 2024-08-20 PROCEDURE — 25000003 PHARM REV CODE 250

## 2024-08-20 PROCEDURE — 80048 BASIC METABOLIC PNL TOTAL CA: CPT

## 2024-08-20 PROCEDURE — 36415 COLL VENOUS BLD VENIPUNCTURE: CPT

## 2024-08-20 PROCEDURE — 11000001 HC ACUTE MED/SURG PRIVATE ROOM

## 2024-08-20 PROCEDURE — 97166 OT EVAL MOD COMPLEX 45 MIN: CPT

## 2024-08-20 PROCEDURE — 85025 COMPLETE CBC W/AUTO DIFF WBC: CPT

## 2024-08-20 PROCEDURE — 94799 UNLISTED PULMONARY SVC/PX: CPT

## 2024-08-20 PROCEDURE — 63600175 PHARM REV CODE 636 W HCPCS: Performed by: NEUROLOGICAL SURGERY

## 2024-08-20 PROCEDURE — 97162 PT EVAL MOD COMPLEX 30 MIN: CPT

## 2024-08-20 PROCEDURE — 63600175 PHARM REV CODE 636 W HCPCS

## 2024-08-20 RX ORDER — DEXAMETHASONE SODIUM PHOSPHATE 4 MG/ML
4 INJECTION, SOLUTION INTRA-ARTICULAR; INTRALESIONAL; INTRAMUSCULAR; INTRAVENOUS; SOFT TISSUE EVERY 6 HOURS
Status: DISCONTINUED | OUTPATIENT
Start: 2024-08-20 | End: 2024-08-21

## 2024-08-20 RX ORDER — MORPHINE SULFATE 4 MG/ML
4 INJECTION, SOLUTION INTRAMUSCULAR; INTRAVENOUS
Status: DISCONTINUED | OUTPATIENT
Start: 2024-08-20 | End: 2024-08-22

## 2024-08-20 RX ADMIN — MUPIROCIN: 20 OINTMENT TOPICAL at 08:08

## 2024-08-20 RX ADMIN — DEXAMETHASONE SODIUM PHOSPHATE 4 MG: 4 INJECTION, SOLUTION INTRA-ARTICULAR; INTRALESIONAL; INTRAMUSCULAR; INTRAVENOUS; SOFT TISSUE at 11:08

## 2024-08-20 RX ADMIN — DEXAMETHASONE SODIUM PHOSPHATE 4 MG: 4 INJECTION, SOLUTION INTRA-ARTICULAR; INTRALESIONAL; INTRAMUSCULAR; INTRAVENOUS; SOFT TISSUE at 06:08

## 2024-08-20 RX ADMIN — CEFAZOLIN SODIUM 2 G: 2 SOLUTION INTRAVENOUS at 06:08

## 2024-08-20 RX ADMIN — OXYCODONE AND ACETAMINOPHEN 1 TABLET: 10; 325 TABLET ORAL at 06:08

## 2024-08-20 RX ADMIN — BISACODYL 10 MG: 10 SUPPOSITORY RECTAL at 09:08

## 2024-08-20 RX ADMIN — CYCLOBENZAPRINE 10 MG: 10 TABLET, FILM COATED ORAL at 06:08

## 2024-08-20 RX ADMIN — SENNOSIDES AND DOCUSATE SODIUM 2 TABLET: 50; 8.6 TABLET ORAL at 08:08

## 2024-08-20 RX ADMIN — QUETIAPINE FUMARATE 300 MG: 300 TABLET ORAL at 08:08

## 2024-08-20 RX ADMIN — LISINOPRIL 20 MG: 20 TABLET ORAL at 08:08

## 2024-08-20 RX ADMIN — OXYCODONE AND ACETAMINOPHEN 1 TABLET: 10; 325 TABLET ORAL at 10:08

## 2024-08-20 RX ADMIN — OXYCODONE AND ACETAMINOPHEN 1 TABLET: 10; 325 TABLET ORAL at 05:08

## 2024-08-20 RX ADMIN — MORPHINE SULFATE 4 MG: 4 INJECTION, SOLUTION INTRAMUSCULAR; INTRAVENOUS at 11:08

## 2024-08-20 RX ADMIN — DIAZEPAM 10 MG: 5 TABLET ORAL at 01:08

## 2024-08-20 RX ADMIN — OXYCODONE AND ACETAMINOPHEN 1 TABLET: 10; 325 TABLET ORAL at 09:08

## 2024-08-20 RX ADMIN — CEFAZOLIN SODIUM 2 G: 2 SOLUTION INTRAVENOUS at 03:08

## 2024-08-20 RX ADMIN — MORPHINE SULFATE 2 MG: 4 INJECTION, SOLUTION INTRAMUSCULAR; INTRAVENOUS at 03:08

## 2024-08-20 RX ADMIN — DEXAMETHASONE SODIUM PHOSPHATE 4 MG: 4 INJECTION, SOLUTION INTRA-ARTICULAR; INTRALESIONAL; INTRAMUSCULAR; INTRAVENOUS; SOFT TISSUE at 12:08

## 2024-08-20 RX ADMIN — VENLAFAXINE HYDROCHLORIDE 300 MG: 150 CAPSULE, EXTENDED RELEASE ORAL at 06:08

## 2024-08-20 RX ADMIN — MORPHINE SULFATE 4 MG: 4 INJECTION, SOLUTION INTRAMUSCULAR; INTRAVENOUS at 08:08

## 2024-08-20 RX ADMIN — CYCLOBENZAPRINE 10 MG: 10 TABLET, FILM COATED ORAL at 09:08

## 2024-08-20 RX ADMIN — MORPHINE SULFATE 2 MG: 4 INJECTION, SOLUTION INTRAMUSCULAR; INTRAVENOUS at 08:08

## 2024-08-20 RX ADMIN — ATORVASTATIN CALCIUM 40 MG: 40 TABLET, FILM COATED ORAL at 08:08

## 2024-08-20 RX ADMIN — MORPHINE SULFATE 4 MG: 4 INJECTION, SOLUTION INTRAMUSCULAR; INTRAVENOUS at 03:08

## 2024-08-20 RX ADMIN — TAMSULOSIN HYDROCHLORIDE 0.4 MG: 0.4 CAPSULE ORAL at 06:08

## 2024-08-20 RX ADMIN — OXYCODONE AND ACETAMINOPHEN 1 TABLET: 10; 325 TABLET ORAL at 01:08

## 2024-08-20 RX ADMIN — HYDROCHLOROTHIAZIDE 25 MG: 25 TABLET ORAL at 08:08

## 2024-08-20 RX ADMIN — HYDROCODONE BITARTRATE AND ACETAMINOPHEN 1 TABLET: 10; 325 TABLET ORAL at 01:08

## 2024-08-20 NOTE — PT/OT/SLP EVAL
Physical Therapy Evaluation    Patient Name:  Lionel Nix   MRN:  [id number]    Recommendations:     Discharge therapy intensity: Low Intensity Therapy   Discharge Equipment Recommendations: none   Barriers to discharge: Impaired mobility    Assessment:     Lionel Nix is a 59 y.o. male admitted with a medical diagnosis of lumbosacral radiculopathy 2/2 DJD, s/p L2-S1 decompression and L3-S1 TLIF on 8/19.  He presents with the following impairments/functional limitations: weakness, impaired endurance, impaired functional mobility, gait instability, impaired balance, orthopedic precautions, pain. Pt tolerated session well. He lives in a [place] with his wife, who is able to help him during the day if needed. He reports he has been using a cane to walk around with due to worsening sciatica prior to admission. Pt demos slightly decreased RLE strength. He is able to perform STS with ModA and walked 30ft with a RW and Leif, but is mainly limited by pain at this time. Pt would benefit from low intensity PT upon d/c.    Rehab Prognosis: Good; patient would benefit from acute skilled PT services to address these deficits and reach maximum level of function.    Recent Surgery: Procedure(s) (LRB):  FUSION, SPINE, LUMBAR, TLIF, USING COMPUTER-ASSISTED NAVIGATION (Left) 1 Day Post-Op    Plan:     During this hospitalization, patient would benefit from acute PT services 6 x/week to address the identified rehab impairments via gait training, therapeutic activities, therapeutic exercises and progress toward the following goals:    Plan of Care Expires:  09/20/24    Subjective     Chief Complaint: pain  Patient/Family Comments/goals: return to PLOF  Pain/Comfort:  Pain Rating 1: other (see comments) (pt did not rate pain)  Location - Orientation 1: lower  Location 1: back  Pain Addressed 1: Distraction, Reposition  Location - Side 2: Right  Location - Orientation 2: posterior  Location 2: leg  Pain Addressed 2: Reposition,  Distraction    Patients cultural, spiritual, Zoroastrianism conflicts given the current situation: no    Living Environment:  Lives with wife in [facility] with one small step to enter.   Prior to admission, patients level of function was independent.  Equipment used at home: cane, straight.  DME owned (not currently used):  rollator .  Upon discharge, patient will have assistance from wife.    Objective:     Communicated with NSG prior to session.  Patient found up in chair with peripheral IV, SCD, TRAVIS drain  upon PT entry to room.    General Precautions: Standard, fall  Orthopedic Precautions:spinal precautions   Braces: LSO  Respiratory Status: Room air  Blood Pressure: 123/77      Exams:  RLE ROM: WFL  RLE Strength: 4/5 grossly  LLE ROM: WFL  LLE Strength: WFL  Skin integrity: Visible skin intact      Functional Mobility:  Transfers:     Sit to Stand:  moderate assistance with rolling walker  Gait: Pt amb 30' with RW and [place]on. Distance limited by pain. Minor R knee buckling x 2 towards end of trial but no major LOB.        Treatment & Education:    Patient provided with verbal education education regarding PT role/goals/POC, post-op precautions, fall prevention, and discharge/DME recommendations.  Understanding was verbalized.     Patient left up in chair with all lines intact, call button in reach, and NSG notified.    GOALS:   Multidisciplinary Problems       Physical Therapy Goals          Problem: Physical Therapy    Goal Priority Disciplines Outcome Goal Variances Interventions   Physical Therapy Goal     PT, PT/OT Progressing     Description: Goals to be met by: 2024     Patient will increase functional independence with mobility by performin. Supine to sit with Modified [place]  2. Sit to supine with Modified [place]  3. Sit to stand transfer with Modified [place]  4. Bed to chair transfer with Modified [place] using Least Restrictive Assistive Device  5. Gait  x 200 feet with  Modified [place] using Least Restrictive Assistive Device.                          History:     Past Medical History:   Diagnosis Date    Anxiety     Chronic right-sided low back pain     Enlarged prostate     Hyperlipidemia     Hypertension     Lumbosacral radiculopathy due to degenerative joint disease of spine     Neuritis or radiculitis due to rupture of lumbar intervertebral disc     Numbness and tingling of both feet     Numbness and tingling of both legs     Obesity     Other spondylosis with radiculopathy, lumbar region        Past Surgical History:   Procedure Laterality Date    BACK SURGERY      COLONOSCOPY      HERNIA REPAIR      SHOULDER SURGERY Left        Time Tracking:     PT Received On: 08/20/24  PT Start Time: 1039     PT Stop Time: 1100  PT Total Time (min): 21 min     Billable Minutes: Evaluation 21 08/20/2024

## 2024-08-20 NOTE — PLAN OF CARE
Problem: Occupational Therapy  Goal: Occupational Therapy Goal  Description: Goals to be met by: 9/17/24     Patient will increase functional independence with ADLs by performing:    UE Dressing with Modified [place].  LE Dressing with Modified [place].with AE prn  Grooming while standing with Modified [place].  Toileting from toilet with Modified [place] for hygiene and clothing management.   Toilet transfer to toilet with Modified [place].    Outcome: Progressing

## 2024-08-20 NOTE — ANESTHESIA POSTPROCEDURE EVALUATION
Anesthesia Post Evaluation    Patient: Lionel Nix    Procedure(s) Performed: Procedure(s) (LRB):  FUSION, SPINE, LUMBAR, TLIF, USING COMPUTER-ASSISTED NAVIGATION (Left)    Final Anesthesia Type: general (/Regional//MAC)      Patient location during evaluation: PACU  Post-procedure mental status: @ basline.  Pain management: adequate    PONV status: See postop meds for drugs used to control n/v if any.  Anesthetic complications: no      Cardiovascular status: blood pressure returned to baseline  Respiratory status: @ baseline.  Hydration status: euvolemic                Vitals Value Taken Time   /64 08/20/24 1508   Temp 36.9 °C (98.5 °F) 08/20/24 1508   Pulse 108 08/20/24 1508   Resp 18 08/20/24 1509   SpO2 95 % 08/20/24 1508         Event Time   Out of Recovery 16:05:00         Pain/Amy Score: Pain Rating Prior to Med Admin: 10 (8/20/2024  3:09 PM)  Pain Rating Post Med Admin: 5 (8/20/2024 11:35 AM)  Amy Score: 10 (8/19/2024  4:05 PM)

## 2024-08-20 NOTE — PLAN OF CARE
Problem: Infection  Goal: Absence of Infection Signs and Symptoms  Outcome: Progressing     Problem: Adult Inpatient Plan of Care  Goal: Plan of Care Review  Outcome: Progressing  Flowsheets (Taken 8/20/2024 1822)  Plan of Care Reviewed With:   patient   spouse  Goal: Patient-Specific Goal (Individualized)  Outcome: Progressing  Goal: Absence of Hospital-Acquired Illness or Injury  Outcome: Progressing  Intervention: Identify and Manage Fall Risk  Flowsheets (Taken 8/20/2024 1822)  Safety Promotion/Fall Prevention:   assistive device/personal item within reach   bed alarm set   high risk medications identified   Fall Risk reviewed with patient/family   side rails raised x 3   supervised activity   Supervised toileting - stay within arms reach   room near unit station   muscle strengthening facilitated   medications reviewed   nonskid shoes/socks when out of bed   instructed to call staff for mobility  Intervention: Prevent Skin Injury  Flowsheets (Taken 8/20/2024 1822)  Body Position: position changed independently  Device Skin Pressure Protection: absorbent pad utilized/changed  Intervention: Prevent and Manage VTE (Venous Thromboembolism) Risk  Flowsheets (Taken 8/20/2024 1822)  VTE Prevention/Management: remove, assess skin, and reapply sequential compression device  Goal: Optimal Comfort and Wellbeing  Outcome: Progressing  Intervention: Monitor Pain and Promote Comfort  Flowsheets (Taken 8/20/2024 1822)  Pain Management Interventions: medication offered  Intervention: Provide Person-Centered Care  Flowsheets (Taken 8/20/2024 1822)  Trust Relationship/Rapport:   care explained   choices provided   questions answered   questions encouraged  Goal: Readiness for Transition of Care  Outcome: Progressing     Problem: Wound  Goal: Optimal Coping  Outcome: Progressing  Goal: Optimal Functional Ability  Outcome: Progressing  Goal: Absence of Infection Signs and Symptoms  Outcome: Progressing  Goal: Improved Oral  Intake  Outcome: Progressing  Goal: Optimal Pain Control and Function  Outcome: Progressing  Goal: Skin Health and Integrity  Outcome: Progressing  Goal: Optimal Wound Healing  Outcome: Progressing     Problem: Fall Injury Risk  Goal: Absence of Fall and Fall-Related Injury  Outcome: Progressing  Intervention: Identify and Manage Contributors  Flowsheets (Taken 8/20/2024 1822)  Self-Care Promotion:   independence encouraged   BADL personal objects within reach   BADL personal routines maintained  Medication Review/Management:   medications reviewed   high-risk medications identified  Intervention: Promote Injury-Free Environment  Flowsheets (Taken 8/20/2024 1822)  Safety Promotion/Fall Prevention:   assistive device/personal item within reach   bed alarm set   high risk medications identified   Fall Risk reviewed with patient/family   side rails raised x 3   supervised activity   Supervised toileting - stay within arms reach   room near unit station   muscle strengthening facilitated   medications reviewed   nonskid shoes/socks when out of bed   instructed to call staff for mobility     Problem: Skin Injury Risk Increased  Goal: Skin Health and Integrity  Outcome: Progressing  Intervention: Optimize Skin Protection  Flowsheets (Taken 8/20/2024 1822)  Pressure Reduction Techniques: frequent weight shift encouraged

## 2024-08-20 NOTE — NURSING
Nurses Note -- 4 Eyes      8/20/2024   1:25 PM      Skin assessed during: Q Shift Change      [] No Altered Skin Integrity Present    []Prevention Measures Documented      [x] Yes- Altered Skin Integrity Present or Discovered   [x] LDA Added if Not in Epic (Describe Wound)   [] New Altered Skin Integrity was Present on Admit and Documented in LDA   [] Wound Image Taken    Wound Care Consulted? No    Attending Nurse:  Seble Santoro RN/Staff Member:   Olesya

## 2024-08-20 NOTE — NURSING
Nurses Note -- 4 Eyes      8/19/2024   7:28 PM      Skin assessed during: Admit      [x] No Altered Skin Integrity Present    []Prevention Measures Documented      [] Yes- Altered Skin Integrity Present or Discovered   [] LDA Added if Not in Epic (Describe Wound)   [] New Altered Skin Integrity was Present on Admit and Documented in LDA   [] Wound Image Taken    Wound Care Consulted? No    Attending Nurse:  darrick Santoro RN/Staff Member:   Anne

## 2024-08-20 NOTE — NURSING
Nurses Note -- 4 Eyes      8/19/2024   7:47 PM      Skin assessed during: Q Shift Change      [x] No Altered Skin Integrity Present    []Prevention Measures Documented      [] Yes- Altered Skin Integrity Present or Discovered   [] LDA Added if Not in Epic (Describe Wound)   [] New Altered Skin Integrity was Present on Admit and Documented in LDA   [] Wound Image Taken    Wound Care Consulted? No    Attending Nurse:  Vinay Santoro RN/Staff Member:  Phuong

## 2024-08-20 NOTE — OP NOTE
[facility]                       [address] [id number]    PATIENT NAME:      PRUDENCE MATOS  YOB: 1965  CSN:               [id number]  MRN:               [id number]  ADMIT DATE:        08/19/2024 06:38:00  PHYSICIAN:         Cra August MD                          OPERATIVE REPORT      DATE OF SURGERY:        SURGEON:  Car August MD    ASSISTANT:  Gonzalo Mansfield.    PREOPERATIVE DIAGNOSES:  Severe disruption at L2-3,L3-4, L4-5, L5-S1 with back pain, leg pain, and severe foraminal narrowing, unremitting pain, previous multiple conservative   care and treatment including injections, therapy.    POSTOPERATIVE DIAGNOSES:  Severe disruption at L3-4, L4-5, L5-S1 with back pain, leg   pain, severe foraminal narrowing, unremitting pain, previous multiple   conservative care and treatment including injections, therapy.    PROCEDURES PERFORMED:  Multilevel open decompression redo from L2-S1, removal of pressure of nerve root thecal sac, fusion with interbodies at L3-4, L4-5, L5-S1and placement of posterior-lateral graft as well and placement of pedicle   Screws from L2 to S1 and O-arm was used.  Monitoring was used.  Microscope was used.    ESTIMATED BLOOD LOSS:  700 mL.    COMPLICATIONS:  No complication.    INDICATION FOR SURGERY:  The patient is a 59-year-old with previous surgery by   me and done well, then increasing pain, multiple levels.  Workup showed MRI, CT   scan, conservative treatment showing severe issues with narrowing, stenosis,   multilevel foraminal pressure L2-3, L3-4, L4-5, L5-S1 disk desiccation and slippage.    After a long discussion and discussing multiple times, the surgery, how it is   done, he wants to proceed with surgery.  We discussed surgery.  Consent risk was   discussed in detail, risks of bleeding, infection, weakness, prior CSF leak,   reoperation, hemorrhage were discussed in  detail again.  They want me to proceed   with surgery.    OPERATIVE PROCEDURE:  The patient was brought to the operating room, Bravo   placed, monitoring devices attached.  Back was prepped and draped.  Incision was   made in midline, went up incidental scar tissue, put retractors, identified the   L2-3, L3-4, L4-5, L5-S1 level, put under the microscope.  We started biting of bone at L2, L3, L4, L5, S1, taking our time, the hard joints and bone were drilled down.  There was external scar tissue and foraminal pressure on the right side.  Also, left   side L4-5 just what looked like possible synovial cyst, severe narrowing.  We   made sure that was opened.  We took our time drilling off the bone.  We went   lower tedious dissection until we could see the S1, L5, L4, L3 nerve root on both   sides.  Complete nerve root decompression and foraminotomy was done, especially   on the left side and right side.  Once that was done, we went behind the frontal   2.  Again, there was tightness in 2 and 3 that was freed up as well.  Once that   was free, image was obtained.  We then put interbodies at L4-5, L5-S1, and L3-L4.    At L3-4, it was 27 x 12; at L4-5, 27 x 13; at L5-S1, 27 x 14.  The interbodies   were packed with Osstem fibers and the patient's own bone.  They were grounded   down.  Once they were put in nicely, the x-rays look good.  We did O-arm spin   and put screws at L2, L3, L5-S1 on the right.  On the left side at L3, L4, L5,   S1 screws went in nicely and appropriately put in nicely recessed.  Once that   was done, the sides were roughed up, decorticated, packed with Osstem fibers and   patient's own bone.  Once that was done, bars and caps were put on and final   tightening was done.  Everything was nicely tied.  The graft was in.  The drain   was left in place, and secured.  So, we used the microscope decompression was   used.  O-arm was used.  Fluoroscopy was used.  Cages were put at L3-4, L4-5, L5-S1 and  sides were then packed from 1 and from 2 down to S1 with the patient's own bone autograft and allograft.  Once that was done, hemostasis obtained.  Bars   and caps were put.  Tightening was done.  Everything was nicely tightened.  A   drain was left in place, secured.  The wound was closed in a multilayer of 0   Vicryl, 3-0 Vicryl, then subcu.  There were no issues or complications from my   standpoint.        ______________________________  MD DRAKE Hawkins/LATOYA  DD:  08/19/2024  Time:  02:39PM  DT:  08/19/2024  Time:  08:56PM  Job #:  [id number]      OPERATIVE REPORT

## 2024-08-20 NOTE — PT/OT/SLP EVAL
"Occupational Therapy  Evaluation    Name: Lionel Nix  MRN: [id number]  Admitting Diagnosis: TLIF L2-S1 decompression and fusion  Recent Surgery: Procedure(s) (LRB):  FUSION, SPINE, LUMBAR, TLIF, USING COMPUTER-ASSISTED NAVIGATION (Left) 1 Day Post-Op    Recommendations:     Discharge therapy intensity: Low Intensity Therapy   Discharge Equipment Recommendations:  none  Barriers to discharge:  None    Assessment:     Lionel Nix is a 59 y.o. male with The encounter diagnosis was Lumbosacral radiculopathy due to degenerative joint disease of spine. .  He presents with good effort, able to ambulate with min assist on unit with RW, noted with some LE weakness and also with the following performance deficits affecting function: weakness, impaired endurance, impaired self care skills, impaired functional mobility.     Rehab Prognosis: good; patient would benefit from acute skilled OT services to address these deficits and reach maximum level of function.       Plan:     Patient to be seen 5 x/week to address the above listed problems via self-care/home management, therapeutic activities, therapeutic exercises  Plan of Care Expires: 09/17/24  Plan of Care Reviewed with: patient    Subjective     Chief Complaint: some back soreness   Patient/Family Comments/goals: "this knee paola went out on me, it was the other one before"    Occupational Profile:  Living Environment: lives in  home with wife, walk in shower  Previous level of function: assist with LB dressing, could ambulate around the house  Roles and Routines: , used to work off shore  Equipment Used at Home: cane, straight  Assistance upon Discharge: yes, wife    Pain/Comfort:  Pain Rating 1:  (says he has back pain but not rated)  Location 1: back    Patients cultural, spiritual, Mosque conflicts given the current situation:      Objective:     OT communicated with nsg and PT prior to session.      Patient was found up in chair with SCD, peripheral IV, " TRAVIS drain upon OT entry to room.    General Precautions: Standard, fall  Orthopedic Precautions: spinal precautions  Braces: LSO    Vital Signs:     Bed Mobility:        Functional Mobility/Transfers:  Sit to stand from chair x 2 reps, min/CGA  Functional Mobility: ambulated on unit with min/CGA, no overt buckling noted    Activities of Daily Living:  Socks with total assist    AMPAC 6 Click ADL:  AMPA Total Score:      Functional Cognition:  intact    Visual Perceptual Skills:  intact    Upper Extremity Function:  Right Upper Extremity:   wfl    Left Upper Extremity:  wfl    Balance:   CGA standing      Additional Treatment:      Patient Education:  Patient provided with verbal education education regarding OT role/goals/POC, post op precautions, fall prevention, and Discharge/DME recommendations.  Understanding was verbalized.     Patient left up in chair with all lines intact, call button in reach, and wife present.    GOALS:   Multidisciplinary Problems       Occupational Therapy Goals          Problem: Occupational Therapy    Goal Priority Disciplines Outcome Interventions   Occupational Therapy Goal     OT, PT/OT Progressing    Description: Goals to be met by: 9/17/24     Patient will increase functional independence with ADLs by performing:    UE Dressing with Modified [place].  LE Dressing with Modified [place].with AE prn  Grooming while standing with Modified [place].  Toileting from toilet with Modified [place] for hygiene and clothing management.   Toilet transfer to toilet with Modified [place].                         History:     Past Medical History:   Diagnosis Date    Anxiety     Chronic right-sided low back pain     Enlarged prostate     Hyperlipidemia     Hypertension     Lumbosacral radiculopathy due to degenerative joint disease of spine     Neuritis or radiculitis due to rupture of lumbar intervertebral disc     Numbness and tingling of both feet     Numbness and  tingling of both legs     Obesity     Other spondylosis with radiculopathy, lumbar region          Past Surgical History:   Procedure Laterality Date    BACK SURGERY      COLONOSCOPY      HERNIA REPAIR      SHOULDER SURGERY Left        Time Tracking:     OT Date of Treatment: 08/20/24  OT Start Time: 1039  OT Stop Time: 1100  OT Total Time (min): 21 min    Billable Minutes:Evaluation mod complexity    8/20/2024

## 2024-08-20 NOTE — PLAN OF CARE
Problem: Physical Therapy  Goal: Physical Therapy Goal  Description: Goals to be met by: 2024     Patient will increase functional independence with mobility by performin. Supine to sit with Modified [place]  2. Sit to supine with Modified [place]  3. Sit to stand transfer with Modified [place]  4. Bed to chair transfer with Modified [place] using Least Restrictive Assistive Device  5. Gait  x 200 feet with Modified [place] using Least Restrictive Assistive Device.     Outcome: Progressing      How Severe Is Your Skin Lesion?: mild Has Your Skin Lesion Been Treated?: not been treated Is This A New Presentation, Or A Follow-Up?: Skin Lesion Additional History: .

## 2024-08-20 NOTE — PLAN OF CARE
08/20/24 1212   Discharge Assessment   Assessment Type Discharge Planning Assessment   Confirmed/corrected address, phone number and insurance Yes   Confirmed Demographics Correct on Facesheet   Source of Information patient   Communicated FÉLIX with patient/caregiver Yes   Reason For Admission Neuritis or radiculitis due to rupture of lumbar intervertebral disc   People in Home child(jackelyn), adult;spouse   Do you expect to return to your current living situation? Yes   Do you have help at home or someone to help you manage your care at home? Yes   Who are your caregiver(s) and their phone number(s)? da stoner, spouse, [phone]   Prior to hospitilization cognitive status: Unable to Assess   Current cognitive status: Alert/Oriented   Walking or Climbing Stairs Difficulty no   Dressing/Bathing Difficulty no   Home Layout Able to live on 1st floor   Equipment Currently Used at Home cane, straight;walker, rolling   Readmission within 30 days? No   Patient currently being followed by outpatient case management? No   Do you currently have service(s) that help you manage your care at home? No   Do you take prescription medications? Yes   Do you have prescription coverage? Yes   Coverage BCBS   Do you have any problems affording any of your prescribed medications? No   Is the patient taking medications as prescribed? yes   Who is going to help you get home at discharge? family   How do you get to doctors appointments? family or friend will provide;car, drives self   Are you on dialysis? No   Do you take coumadin? No   Discharge Plan A Home Health   Discharge Plan B Home with family   DME Needed Upon Discharge  none   Discharge Plan discussed with: Patient   Transition of Care Barriers None   OTHER   Name(s) of People in Home spouse and dtr     Completed assessment with pt at bedside. Introduced self and explained role as SW. Pt verb understanding to all questions asked. PCP is Dr. Poole at [facility] in [place] and  pharmacy is [facility] in [place]. D/c dispo pending post-op therapy recs.     Christel Tubbs, LCSW    1350 Referral sent to [facility] in [place]. SW provided list of HH agencies, but pt had no preference.    1405 Denied by [facility], only take [facility]. Referral sent to [facility] [place].

## 2024-08-20 NOTE — PLAN OF CARE
Problem: Infection  Goal: Absence of Infection Signs and Symptoms  Outcome: Progressing     Problem: Adult Inpatient Plan of Care  Goal: Plan of Care Review  Outcome: Progressing  Goal: Patient-Specific Goal (Individualized)  Outcome: Progressing  Goal: Absence of Hospital-Acquired Illness or Injury  Outcome: Progressing  Goal: Optimal Comfort and Wellbeing  Outcome: Progressing  Goal: Readiness for Transition of Care  Outcome: Progressing     Problem: Wound  Goal: Optimal Coping  Outcome: Progressing  Goal: Optimal Functional Ability  Outcome: Progressing  Goal: Absence of Infection Signs and Symptoms  Outcome: Progressing  Goal: Improved Oral Intake  Outcome: Progressing  Goal: Optimal Pain Control and Function  Outcome: Progressing  Goal: Skin Health and Integrity  Outcome: Progressing  Goal: Optimal Wound Healing  Outcome: Progressing     Problem: Fall Injury Risk  Goal: Absence of Fall and Fall-Related Injury  Outcome: Progressing

## 2024-08-21 PROCEDURE — 94799 UNLISTED PULMONARY SVC/PX: CPT

## 2024-08-21 PROCEDURE — 11000001 HC ACUTE MED/SURG PRIVATE ROOM

## 2024-08-21 PROCEDURE — 99900035 HC TECH TIME PER 15 MIN (STAT)

## 2024-08-21 PROCEDURE — 63600175 PHARM REV CODE 636 W HCPCS: Performed by: PHYSICIAN ASSISTANT

## 2024-08-21 PROCEDURE — 97535 SELF CARE MNGMENT TRAINING: CPT

## 2024-08-21 PROCEDURE — 25000003 PHARM REV CODE 250

## 2024-08-21 PROCEDURE — 63600175 PHARM REV CODE 636 W HCPCS: Performed by: NEUROLOGICAL SURGERY

## 2024-08-21 PROCEDURE — 63600175 PHARM REV CODE 636 W HCPCS

## 2024-08-21 PROCEDURE — 97116 GAIT TRAINING THERAPY: CPT | Mod: CQ

## 2024-08-21 RX ORDER — DEXAMETHASONE SODIUM PHOSPHATE 4 MG/ML
6 INJECTION, SOLUTION INTRA-ARTICULAR; INTRALESIONAL; INTRAMUSCULAR; INTRAVENOUS; SOFT TISSUE EVERY 6 HOURS
Status: DISCONTINUED | OUTPATIENT
Start: 2024-08-21 | End: 2024-08-29

## 2024-08-21 RX ORDER — GABAPENTIN 400 MG/1
400 CAPSULE ORAL 3 TIMES DAILY
Status: DISCONTINUED | OUTPATIENT
Start: 2024-08-21 | End: 2024-08-21

## 2024-08-21 RX ADMIN — DEXAMETHASONE SODIUM PHOSPHATE 6 MG: 4 INJECTION, SOLUTION INTRA-ARTICULAR; INTRALESIONAL; INTRAMUSCULAR; INTRAVENOUS; SOFT TISSUE at 04:08

## 2024-08-21 RX ADMIN — CYCLOBENZAPRINE 10 MG: 10 TABLET, FILM COATED ORAL at 04:08

## 2024-08-21 RX ADMIN — CYCLOBENZAPRINE 10 MG: 10 TABLET, FILM COATED ORAL at 08:08

## 2024-08-21 RX ADMIN — DIAZEPAM 10 MG: 5 TABLET ORAL at 08:08

## 2024-08-21 RX ADMIN — MORPHINE SULFATE 2 MG: 4 INJECTION, SOLUTION INTRAMUSCULAR; INTRAVENOUS at 04:08

## 2024-08-21 RX ADMIN — MORPHINE SULFATE 4 MG: 4 INJECTION, SOLUTION INTRAMUSCULAR; INTRAVENOUS at 11:08

## 2024-08-21 RX ADMIN — OXYCODONE AND ACETAMINOPHEN 1 TABLET: 10; 325 TABLET ORAL at 08:08

## 2024-08-21 RX ADMIN — DIAZEPAM 10 MG: 5 TABLET ORAL at 01:08

## 2024-08-21 RX ADMIN — DEXAMETHASONE SODIUM PHOSPHATE 4 MG: 4 INJECTION, SOLUTION INTRA-ARTICULAR; INTRALESIONAL; INTRAMUSCULAR; INTRAVENOUS; SOFT TISSUE at 05:08

## 2024-08-21 RX ADMIN — BISACODYL 10 MG: 10 SUPPOSITORY RECTAL at 08:08

## 2024-08-21 RX ADMIN — ENOXAPARIN SODIUM 40 MG: 40 INJECTION SUBCUTANEOUS at 04:08

## 2024-08-21 RX ADMIN — QUETIAPINE FUMARATE 300 MG: 300 TABLET ORAL at 08:08

## 2024-08-21 RX ADMIN — MUPIROCIN: 20 OINTMENT TOPICAL at 11:08

## 2024-08-21 RX ADMIN — MORPHINE SULFATE 2 MG: 4 INJECTION, SOLUTION INTRAMUSCULAR; INTRAVENOUS at 02:08

## 2024-08-21 RX ADMIN — HYDROCHLOROTHIAZIDE 25 MG: 25 TABLET ORAL at 08:08

## 2024-08-21 RX ADMIN — OXYCODONE AND ACETAMINOPHEN 1 TABLET: 10; 325 TABLET ORAL at 01:08

## 2024-08-21 RX ADMIN — VENLAFAXINE HYDROCHLORIDE 300 MG: 150 CAPSULE, EXTENDED RELEASE ORAL at 08:08

## 2024-08-21 RX ADMIN — DEXAMETHASONE SODIUM PHOSPHATE 6 MG: 4 INJECTION, SOLUTION INTRA-ARTICULAR; INTRALESIONAL; INTRAMUSCULAR; INTRAVENOUS; SOFT TISSUE at 11:08

## 2024-08-21 RX ADMIN — OXYCODONE AND ACETAMINOPHEN 1 TABLET: 10; 325 TABLET ORAL at 04:08

## 2024-08-21 RX ADMIN — TAMSULOSIN HYDROCHLORIDE 0.4 MG: 0.4 CAPSULE ORAL at 05:08

## 2024-08-21 RX ADMIN — CYCLOBENZAPRINE 10 MG: 10 TABLET, FILM COATED ORAL at 11:08

## 2024-08-21 RX ADMIN — SENNOSIDES AND DOCUSATE SODIUM 2 TABLET: 50; 8.6 TABLET ORAL at 08:08

## 2024-08-21 RX ADMIN — LISINOPRIL 20 MG: 20 TABLET ORAL at 08:08

## 2024-08-21 RX ADMIN — ATORVASTATIN CALCIUM 40 MG: 40 TABLET, FILM COATED ORAL at 08:08

## 2024-08-21 RX ADMIN — MORPHINE SULFATE 4 MG: 4 INJECTION, SOLUTION INTRAMUSCULAR; INTRAVENOUS at 06:08

## 2024-08-21 NOTE — PT/OT/SLP PROGRESS
Occupational Therapy   Treatment    Name: Lionel Nix  MRN: [id number]  Admitting Diagnosis:  Lumbosacral radiculopathy due to degenerative joint disease of spine  2 Days Post-Op    Recommendations:     Recommended therapy intensity at discharge: Low Intensity Therapy   Discharge Equipment Recommendations:  none  Barriers to discharge:  None    Assessment:     Lionel Nix is a 59 y.o. male with a medical diagnosis of Lumbosacral radiculopathy due to degenerative joint disease of spine, s/p L3-S1 PLIF.  He presents with continuing improvement with mobility and ADL's, still with pain being a main limiting factor. Performance deficits affecting function are weakness, impaired endurance, impaired self care skills, impaired functional mobility.     Rehab Prognosis:  good; patient would benefit from acute skilled OT services to address these deficits and reach maximum level of function.       Plan:     Patient to be seen 5 x/week to address the above listed problems via self-care/home management, therapeutic activities, therapeutic exercises  Plan of Care Expires: 09/17/24  Plan of Care Reviewed with: patient    Subjective     Pain/Comfort:  Pain Rating 1:  (not rated but still has back pain)  Location - Orientation 1: lower  Location 1: back  Pain Addressed 1: Reposition, Distraction, Pre-medicate for activity    Objective:     Communicated with: nsg prior to session.  Patient found supine with SCD, peripheral IV, TRAVIS drain upon OT entry to room.    General Precautions: Standard, fall    Orthopedic Precautions:spinal precautions  Braces: LSO  Respiratory Status:   Vital Signs:      Occupational Performance:     Bed Mobility:    Sup to sit with SBA and cues for technique     Functional Mobility/Transfers:  Sit to stand with CGA  Functional Mobility: able to take steps, transfer to chair with RW and CGA    Activities of Daily Living:  Issued AE:sock aid, reacher, LH shoe horn, LH sponge and educated on use, pt able to  return demo for LB dressing with reacher and sock aid, shoe horn  Able to don brace with min cues for good fit and technique  Educated and demo'd throughout on all precautions related to mobility and ADL performance  Educated on rec's for HH and eventual outpatient and resumption of activities when ok'd by MD  Educated on safety with transfers into truck    Therapeutic Activities:       Therapeutic Exercise:      [facility] 6 Click ADL:      Patient Education:  Patient and spouse were provided with verbal education and demonstrations education regarding OT role/goals/POC, post op precautions, safety awareness, Discharge/DME recommendations, and AE .  Patient was able to return demonstration.      Patient left up in chair with all lines intact, call button in reach, and wife present.    GOALS:   Multidisciplinary Problems       Occupational Therapy Goals          Problem: Occupational Therapy    Goal Priority Disciplines Outcome Interventions   Occupational Therapy Goal     OT, PT/OT Progressing    Description: Goals to be met by: 9/17/24     Patient will increase functional independence with ADLs by performing:    UE Dressing with Modified [place].  LE Dressing with Modified [place].with AE prn  Grooming while standing with Modified [place].  Toileting from toilet with Modified [place] for hygiene and clothing management.   Toilet transfer to toilet with Modified [place].                         Time Tracking:     OT Date of Treatment: 08/21/24  OT Start Time: 0852  OT Stop Time: 0947  OT Total Time (min): 55 min    Billable Minutes:Self Care/Home Management 55 min    OT/KRUPA: OT     Number of KRUPA visits since last OT visit: 1 8/21/2024

## 2024-08-21 NOTE — NURSING
Nurses Note -- 4 Eyes      8/20/2024   7:33 PM      Skin assessed during: Q Shift Change      [x] No Altered Skin Integrity Present    [x]Prevention Measures Documented      [] Yes- Altered Skin Integrity Present or Discovered   [] LDA Added if Not in Epic (Describe Wound)   [] New Altered Skin Integrity was Present on Admit and Documented in LDA   [] Wound Image Taken    Wound Care Consulted? No    Attending Nurse:  Vinay Santoro RN/Staff Member:  Seble

## 2024-08-21 NOTE — PROGRESS NOTES
POD#2 L2-S1 decompression with L3-S1 fusion  He is lying in bed, NAD  He is c/o right posterior leg pain, especially with sitting  His numbness in the right foot is somewhat improved  He is working with PT    VERÓNICA Land well, no deficits appreciated  Incision c/d/I  TRAVIS output 150/60    Plan: Continue TRAVIS drain to thumb print  Continue current dressing  PT/OT for OOB  I will increase decadron today  SCDs and lovenox for DVT prophylaxis

## 2024-08-21 NOTE — PLAN OF CARE
Problem: Infection  Goal: Absence of Infection Signs and Symptoms  Outcome: Progressing     Problem: Adult Inpatient Plan of Care  Goal: Plan of Care Review  8/21/2024 1519 by Seble Felder RN  Outcome: Progressing  8/21/2024 1516 by Seble Felder RN  Flowsheets (Taken 8/21/2024 1516)  Plan of Care Reviewed With: patient  Goal: Patient-Specific Goal (Individualized)  Outcome: Progressing  Goal: Absence of Hospital-Acquired Illness or Injury  Outcome: Progressing  Intervention: Identify and Manage Fall Risk  Flowsheets (Taken 8/21/2024 1516)  Safety Promotion/Fall Prevention:   assistive device/personal item within reach   Supervised toileting - stay within arms reach   supervised activity   side rails raised x 3   nonskid shoes/socks when out of bed   muscle strengthening facilitated   medications reviewed   high risk medications identified   family expresses understanding of fall risk and prevention   Fall Risk reviewed with patient/family  Intervention: Prevent Skin Injury  Flowsheets (Taken 8/21/2024 1516)  Body Position: position changed independently  Skin Protection: incontinence pads utilized  Device Skin Pressure Protection: absorbent pad utilized/changed  Intervention: Prevent and Manage VTE (Venous Thromboembolism) Risk  Flowsheets (Taken 8/21/2024 1516)  VTE Prevention/Management: remove, assess skin, and reapply sequential compression device  Intervention: Prevent Infection  Flowsheets (Taken 8/21/2024 1516)  Infection Prevention:   hand hygiene promoted   personal protective equipment utilized   single patient room provided  Goal: Optimal Comfort and Wellbeing  Outcome: Progressing  Intervention: Monitor Pain and Promote Comfort  Flowsheets (Taken 8/21/2024 1516)  Pain Management Interventions:   around-the-clock dosing utilized   medication offered  Intervention: Provide Person-Centered Care  Flowsheets (Taken 8/21/2024 1516)  Trust Relationship/Rapport:   care explained   choices provided    questions answered   questions encouraged   empathic listening provided  Goal: Readiness for Transition of Care  Outcome: Progressing     Problem: Wound  Goal: Optimal Coping  Outcome: Progressing  Intervention: Support Patient and Family Response  Flowsheets (Taken 8/21/2024 1516)  Supportive Measures:   decision-making supported   self-responsibility promoted  Family/Support System Care: involvement promoted  Goal: Optimal Functional Ability  Outcome: Progressing  Intervention: Optimize Functional Ability  Flowsheets (Taken 8/21/2024 1516)  Activity Management: Up in chair - L3  Goal: Absence of Infection Signs and Symptoms  Outcome: Progressing  Goal: Improved Oral Intake  Outcome: Progressing  Intervention: Promote and Optimize Oral Intake  Flowsheets (Taken 8/21/2024 1516)  Oral Nutrition Promotion: rest periods promoted  Goal: Optimal Pain Control and Function  Outcome: Progressing  Intervention: Prevent or Manage Pain  Flowsheets (Taken 8/21/2024 1516)  Sleep/Rest Enhancement: regular sleep/rest pattern promoted  Pain Management Interventions:   around-the-clock dosing utilized   medication offered  Goal: Skin Health and Integrity  Outcome: Progressing  Intervention: Optimize Skin Protection  Flowsheets (Taken 8/21/2024 1516)  Skin Protection: incontinence pads utilized  Activity Management: Up in chair - L3  Goal: Optimal Wound Healing  Outcome: Progressing  Intervention: Promote Wound Healing  Flowsheets (Taken 8/21/2024 1516)  Sleep/Rest Enhancement: regular sleep/rest pattern promoted     Problem: Fall Injury Risk  Goal: Absence of Fall and Fall-Related Injury  Outcome: Progressing  Intervention: Identify and Manage Contributors  Flowsheets (Taken 8/21/2024 1516)  Self-Care Promotion:   independence encouraged   BADL personal objects within reach   BADL personal routines maintained  Medication Review/Management:   medications reviewed   high-risk medications identified  Intervention: Promote Injury-Free  Environment  Flowsheets (Taken 8/21/2024 1516)  Safety Promotion/Fall Prevention:   assistive device/personal item within reach   Supervised toileting - stay within arms reach   supervised activity   side rails raised x 3   nonskid shoes/socks when out of bed   muscle strengthening facilitated   medications reviewed   high risk medications identified   family expresses understanding of fall risk and prevention   Fall Risk reviewed with patient/family     Problem: Skin Injury Risk Increased  Goal: Skin Health and Integrity  Outcome: Progressing  Intervention: Optimize Skin Protection  Flowsheets (Taken 8/21/2024 1516)  Skin Protection: incontinence pads utilized  Activity Management: Up in chair - L3  Intervention: Promote and Optimize Oral Intake  Flowsheets (Taken 8/21/2024 1516)  Oral Nutrition Promotion: rest periods promoted

## 2024-08-21 NOTE — PT/OT/SLP PROGRESS
Physical Therapy Treatment    Patient Name:  Lionel Nix   MRN:  [id number]    Recommendations:     Discharge therapy intensity: Low Intensity Therapy , if buckling doesn't improve. May benefit from high intensity     Discharge Equipment Recommendations: walker, rolling  Barriers to discharge: Impaired mobility    Assessment:     Lionel Nix is a 59 y.o. male admitted with a medical diagnosis of lumbosacral radiculopathy 2/2 DJD, s/p L2-S1 decompression and L3-S1 TLIF on 8/19.  .  He presents with the following impairments/functional limitations: weakness, impaired endurance, impaired functional mobility, gait instability, impaired balance, orthopedic precautions, pain .    Pt with major episodes of buckling throughout amb trial. Pt having to bear majority on weight through BUE on RW to offload BLE. If pt applies majority of weight through BLE they are unable to hold him upright and buckle immediately. NSG aware.     Rehab Prognosis: Good; patient would benefit from acute skilled PT services to address these deficits and reach maximum level of function.    Recent Surgery: Procedure(s) (LRB):  FUSION, SPINE, LUMBAR, TLIF, USING COMPUTER-ASSISTED NAVIGATION (Left) 2 Days Post-Op    Plan:     During this hospitalization, patient would benefit from acute PT services 6 x/week to address the identified rehab impairments via gait training, therapeutic activities, therapeutic exercises and progress toward the following goals:    Plan of Care Expires:  09/20/24    Subjective     Chief Complaint:   Patient/Family Comments/goals:   Pain/Comfort:  Location 1: back  Pain Addressed 1: Pre-medicate for activity, Reposition, Distraction, Cessation of Activity, Nurse notified      Objective:     Communicated with NSG prior to session.  Patient found up in chair with SCD, TRAVIS drain upon PT entry to room.     General Precautions: Standard, fall  Orthopedic Precautions: spinal precautions  Braces: LSO  Respiratory Status: Room  air  Blood Pressure:   Skin Integrity: Visible skin intact      Functional Mobility:  Transfers:     Sit to Stand:  moderate assistance with rolling walker and pt yelling out in pain and required increased time to complete stand.   Gait: pt amb 60ft with RW [place]. Pt with step-to gait pattern and had several episodes of buckling. Able to self correct majority of the time.     Education:  Patient and spouse were provided with verbal education education regarding PT role/goals/POC, post-op precautions, fall prevention, safety awareness, and discharge/DME recommendations.  Understanding was verbalized.     Patient left up in chair with all lines intact, call button in reach, and NSG present    GOALS:   Multidisciplinary Problems       Physical Therapy Goals          Problem: Physical Therapy    Goal Priority Disciplines Outcome Goal Variances Interventions   Physical Therapy Goal     PT, PT/OT Progressing     Description: Goals to be met by: 2024     Patient will increase functional independence with mobility by performin. Supine to sit with Modified [place]  2. Sit to supine with Modified [place]  3. Sit to stand transfer with Modified [place]  4. Bed to chair transfer with Modified [place] using Least Restrictive Assistive Device  5. Gait  x 200 feet with Modified [place] using Least Restrictive Assistive Device.                          Time Tracking:     PT Received On: 24  PT Start Time: 1422     PT Stop Time: 1442  PT Total Time (min): 20 min     Billable Minutes: Gait Training 14 and Therapeutic Activity 6    Treatment Type: Treatment  PT/PTA: PTA     Number of PTA visits since last PT visit: 2024

## 2024-08-21 NOTE — NURSING
Nurses Note -- 4 Eyes      8/21/2024   3:20 PM      Skin assessed during: Q Shift Change      [] No Altered Skin Integrity Present    []Prevention Measures Documented      [x] Yes- Altered Skin Integrity Present or Discovered   [x] LDA Added if Not in Epic (Describe Wound)   [] New Altered Skin Integrity was Present on Admit and Documented in LDA   [x] Wound Image Taken    Wound Care Consulted? No    Attending Nurse:  Seble Santoro RN/Staff Member:   Olesya

## 2024-08-22 PROCEDURE — 97530 THERAPEUTIC ACTIVITIES: CPT

## 2024-08-22 PROCEDURE — 94760 N-INVAS EAR/PLS OXIMETRY 1: CPT

## 2024-08-22 PROCEDURE — 11000001 HC ACUTE MED/SURG PRIVATE ROOM

## 2024-08-22 PROCEDURE — 25000003 PHARM REV CODE 250

## 2024-08-22 PROCEDURE — 25000003 PHARM REV CODE 250: Performed by: NEUROLOGICAL SURGERY

## 2024-08-22 PROCEDURE — 63600175 PHARM REV CODE 636 W HCPCS: Performed by: PHYSICIAN ASSISTANT

## 2024-08-22 PROCEDURE — 63600175 PHARM REV CODE 636 W HCPCS

## 2024-08-22 PROCEDURE — 99900031 HC PATIENT EDUCATION (STAT)

## 2024-08-22 PROCEDURE — 97535 SELF CARE MNGMENT TRAINING: CPT

## 2024-08-22 PROCEDURE — 94799 UNLISTED PULMONARY SVC/PX: CPT

## 2024-08-22 PROCEDURE — 97116 GAIT TRAINING THERAPY: CPT | Mod: CQ

## 2024-08-22 PROCEDURE — 97530 THERAPEUTIC ACTIVITIES: CPT | Mod: CQ

## 2024-08-22 RX ORDER — DIAZEPAM 5 MG/1
10 TABLET ORAL EVERY 6 HOURS
Status: DISCONTINUED | OUTPATIENT
Start: 2024-08-22 | End: 2024-09-04 | Stop reason: HOSPADM

## 2024-08-22 RX ORDER — DIAZEPAM 5 MG/1
10 TABLET ORAL EVERY 6 HOURS
Status: DISCONTINUED | OUTPATIENT
Start: 2024-08-22 | End: 2024-08-22

## 2024-08-22 RX ORDER — HYDROMORPHONE HYDROCHLORIDE 2 MG/ML
0.5 INJECTION, SOLUTION INTRAMUSCULAR; INTRAVENOUS; SUBCUTANEOUS EVERY 4 HOURS PRN
Status: DISCONTINUED | OUTPATIENT
Start: 2024-08-22 | End: 2024-08-24

## 2024-08-22 RX ORDER — METHOCARBAMOL 100 MG/ML
1000 INJECTION, SOLUTION INTRAMUSCULAR; INTRAVENOUS ONCE
Status: COMPLETED | OUTPATIENT
Start: 2024-08-22 | End: 2024-08-22

## 2024-08-22 RX ADMIN — OXYCODONE AND ACETAMINOPHEN 1 TABLET: 10; 325 TABLET ORAL at 12:08

## 2024-08-22 RX ADMIN — DEXAMETHASONE SODIUM PHOSPHATE 6 MG: 4 INJECTION, SOLUTION INTRA-ARTICULAR; INTRALESIONAL; INTRAMUSCULAR; INTRAVENOUS; SOFT TISSUE at 12:08

## 2024-08-22 RX ADMIN — DEXAMETHASONE SODIUM PHOSPHATE 6 MG: 4 INJECTION, SOLUTION INTRA-ARTICULAR; INTRALESIONAL; INTRAMUSCULAR; INTRAVENOUS; SOFT TISSUE at 11:08

## 2024-08-22 RX ADMIN — VENLAFAXINE HYDROCHLORIDE 300 MG: 150 CAPSULE, EXTENDED RELEASE ORAL at 09:08

## 2024-08-22 RX ADMIN — DEXAMETHASONE SODIUM PHOSPHATE 6 MG: 4 INJECTION, SOLUTION INTRA-ARTICULAR; INTRALESIONAL; INTRAMUSCULAR; INTRAVENOUS; SOFT TISSUE at 05:08

## 2024-08-22 RX ADMIN — OXYCODONE AND ACETAMINOPHEN 1 TABLET: 10; 325 TABLET ORAL at 02:08

## 2024-08-22 RX ADMIN — SENNOSIDES AND DOCUSATE SODIUM 2 TABLET: 50; 8.6 TABLET ORAL at 09:08

## 2024-08-22 RX ADMIN — DIAZEPAM 10 MG: 5 TABLET ORAL at 05:08

## 2024-08-22 RX ADMIN — METHOCARBAMOL 1000 MG: 100 INJECTION INTRAMUSCULAR; INTRAVENOUS at 08:08

## 2024-08-22 RX ADMIN — LISINOPRIL 20 MG: 20 TABLET ORAL at 09:08

## 2024-08-22 RX ADMIN — DIAZEPAM 10 MG: 5 TABLET ORAL at 02:08

## 2024-08-22 RX ADMIN — HYDROCHLOROTHIAZIDE 25 MG: 25 TABLET ORAL at 09:08

## 2024-08-22 RX ADMIN — OXYCODONE AND ACETAMINOPHEN 1 TABLET: 10; 325 TABLET ORAL at 06:08

## 2024-08-22 RX ADMIN — DEXAMETHASONE SODIUM PHOSPHATE 6 MG: 4 INJECTION, SOLUTION INTRA-ARTICULAR; INTRALESIONAL; INTRAMUSCULAR; INTRAVENOUS; SOFT TISSUE at 06:08

## 2024-08-22 RX ADMIN — QUETIAPINE FUMARATE 300 MG: 300 TABLET ORAL at 09:08

## 2024-08-22 RX ADMIN — HYDROMORPHONE HYDROCHLORIDE 0.5 MG: 2 INJECTION INTRAMUSCULAR; INTRAVENOUS; SUBCUTANEOUS at 04:08

## 2024-08-22 RX ADMIN — OXYCODONE AND ACETAMINOPHEN 1 TABLET: 10; 325 TABLET ORAL at 05:08

## 2024-08-22 RX ADMIN — BISACODYL 10 MG: 10 SUPPOSITORY RECTAL at 09:08

## 2024-08-22 RX ADMIN — OXYCODONE AND ACETAMINOPHEN 1 TABLET: 10; 325 TABLET ORAL at 10:08

## 2024-08-22 RX ADMIN — ATORVASTATIN CALCIUM 40 MG: 40 TABLET, FILM COATED ORAL at 09:08

## 2024-08-22 RX ADMIN — ENOXAPARIN SODIUM 40 MG: 40 INJECTION SUBCUTANEOUS at 04:08

## 2024-08-22 RX ADMIN — HYDROMORPHONE HYDROCHLORIDE 0.5 MG: 2 INJECTION INTRAMUSCULAR; INTRAVENOUS; SUBCUTANEOUS at 11:08

## 2024-08-22 RX ADMIN — MORPHINE SULFATE 2 MG: 4 INJECTION, SOLUTION INTRAMUSCULAR; INTRAVENOUS at 03:08

## 2024-08-22 RX ADMIN — TAMSULOSIN HYDROCHLORIDE 0.4 MG: 0.4 CAPSULE ORAL at 05:08

## 2024-08-22 NOTE — PLAN OF CARE
Met with pt at bedside to discuss d/c POC and IPR placement. Pt agreeable with IPR and requested to be placed at a facility in [place]. SW provided list of facilities and FOC. Requested referral to be sent to [facility] as first choice and [facility] as second. Referral sent to [facility] via [facility].     Christel Tubbs LCSW

## 2024-08-22 NOTE — NURSING
Nurses Note -- 4 Eyes      8/22/2024   4:35 AM      Skin assessed during: Q Shift Change      [x] No Altered Skin Integrity Present    [x]Prevention Measures Documented      [] Yes- Altered Skin Integrity Present or Discovered   [] LDA Added if Not in Epic (Describe Wound)   [] New Altered Skin Integrity was Present on Admit and Documented in LDA   [] Wound Image Taken    Wound Care Consulted? No    Attending Nurse:  Olesya Santoro RN/Staff Member:   ruth/anant

## 2024-08-22 NOTE — PT/OT/SLP PROGRESS
Occupational Therapy   Treatment    Name: Lionel Nix  MRN: [id number]  Admitting Diagnosis:  Lumbosacral radiculopathy due to degenerative joint disease of spine  3 Days Post-Op    Recommendations:     Recommended therapy intensity at discharge: High intensity therapy  Discharge Equipment Recommendations:  none  Barriers to discharge:  None    Assessment:     Lionel Nix is a 59 y.o. male with a medical diagnosis of Lumbosacral radiculopathy due to degenerative joint disease of spine, s/p L3-S1 PLIF.  He presents with decreased mobility and safety with ambulation from initial eval, noted with increased pain in back and into L leg, noted buckling with any decrease in UE support on the walker, still with pain also being a main limiting factor. Pt is a high fall risk presently and would benefit from further therapy at next level of care. Performance deficits affecting function are weakness, impaired endurance, impaired self care skills, impaired functional mobility.     Rehab Prognosis:  good; patient would benefit from acute skilled OT services to address these deficits and reach maximum level of function.       Plan:     Patient to be seen 5 x/week to address the above listed problems via self-care/home management, therapeutic activities, therapeutic exercises  Plan of Care Expires: 09/17/24  Plan of Care Reviewed with: patient    Subjective     Pain/Comfort:  Pain Rating 1: 8/10  Location - Orientation 1: lower  Location 1: back (and down L leg)  Pain Addressed 1: Reposition, Cessation of Activity    Objective:     Communicated with: nsdiana prior to session.  Patient found supine with SCD, peripheral IV, TRAVIS drain upon OT entry to room.    General Precautions: Standard, fall    Orthopedic Precautions:spinal precautions  Braces: LSO  Respiratory Status:   Vital Signs:      Occupational Performance:     Bed Mobility:        Functional Mobility/Transfers:  Sit to stand with min assist from toilet  Functional Mobility:  ambulated back to chair from toilet with min assist with RW and significant support of UE's    Activities of Daily Living:  Toilet hygiene mod I, clothing management with mod/max assist standing  Able to don brace without cues  Educated and demo'd throughout on all precautions related to mobility and ADL performance  Educated on new rec's for high intensity therapy      Therapeutic Activities:       Therapeutic Exercise:      [facility] 6 Click ADL:      Patient Education:  Patient and spouse were provided with verbal education and demonstrations education regarding OT role/goals/POC, post op precautions, safety awareness, Discharge/DME recommendations, and AE .  Patient was able to return demonstration.      Patient left up in chair with all lines intact, call button in reach, and nsg present.    GOALS:   Multidisciplinary Problems       Occupational Therapy Goals          Problem: Occupational Therapy    Goal Priority Disciplines Outcome Interventions   Occupational Therapy Goal     OT, PT/OT Progressing    Description: Goals to be met by: 9/17/24     Patient will increase functional independence with ADLs by performing:    UE Dressing with Modified [place].  LE Dressing with Modified [place].with AE prn  Grooming while standing with Modified [place].  Toileting from toilet with Modified [place] for hygiene and clothing management.   Toilet transfer to toilet with Modified [place].                         Time Tracking:     OT Date of Treatment: 08/22/24  OT Start Time: 1126  OT Stop Time: 1151  OT Total Time (min): 25 min    Billable Minutes:Self Care/Home Management 25 min    OT/KRUPA: OT     Number of KRUPA visits since last OT visit: 2    8/22/2024

## 2024-08-22 NOTE — PROGRESS NOTES
Complains of leg pain/numbness.  5/5 strength.  Ambulating with help.  CT of LS spine today.  Change medications.

## 2024-08-22 NOTE — NURSING
Nurses Note -- 4 Eyes      8/22/2024   6:07 PM      Skin assessed during: Q Shift Change      [] No Altered Skin Integrity Present    []Prevention Measures Documented      [x] Yes- Altered Skin Integrity Present or Discovered   [] LDA Added if Not in Epic (Describe Wound)   [] New Altered Skin Integrity was Present on Admit and Documented in LDA   [] Wound Image Taken    Wound Care Consulted? No    Attending Nurse:  RICHIE Patel    Second RN/Staff Member:   MELODIE Dacosta

## 2024-08-22 NOTE — PT/OT/SLP PROGRESS
Physical Therapy Treatment    Patient Name:  Lionel Nix   MRN:  [id number]    Recommendations:     Discharge therapy intensity: High Intensity Therapy , pt still having buckling episodes during ambulation. Remains a high fall risk.     Discharge Equipment Recommendations: walker, rolling  Barriers to discharge: Impaired mobility    Assessment:     Lionel Nix is a 59 y.o. male admitted with a medical diagnosis of lumbosacral radiculopathy 2/2 DJD, s/p L2-S1 decompression and L3-S1 TLIF on 8/19.  .  He presents with the following impairments/functional limitations: weakness, impaired endurance, impaired functional mobility, gait instability, impaired balance, orthopedic precautions, pain .    Pt with major episodes of buckling throughout amb trial. Pt having to bear majority on weight through BUE on RW to offload BLE. If pt applies majority of weight through BLE they are unable to hold him upright and buckle immediately. NSG aware.     D/c rec changed to high intensity 2/2 pt buckling not having improved thus far and remaining a high fall risk.     Rehab Prognosis: Good; patient would benefit from acute skilled PT services to address these deficits and reach maximum level of function.    Recent Surgery: Procedure(s) (LRB):  FUSION, SPINE, LUMBAR, TLIF, USING COMPUTER-ASSISTED NAVIGATION (Left) 3 Days Post-Op    Plan:     During this hospitalization, patient would benefit from acute PT services 6 x/week to address the identified rehab impairments via gait training, therapeutic activities, therapeutic exercises and progress toward the following goals:    Plan of Care Expires:  09/20/24    Subjective     Chief Complaint:   Patient/Family Comments/goals:   Pain/Comfort:  Location 1: back  Pain Addressed 1: Reposition, Distraction, Pre-medicate for activity  Location - Side 2: Left  Location 2: leg  Pain Addressed 2: Reposition, Distraction      Objective:     Communicated with NSG prior to session.  Patient found up in  chair with TRAVIS drain upon PT entry to room.     General Precautions: Standard, fall  Orthopedic Precautions: spinal precautions  Braces: LSO  Respiratory Status: Room air  Blood Pressure:   Skin Integrity: Visible skin intact      Functional Mobility:  Bed Mobility:     Scooting: stand by assistance  Supine to Sit: minimum assistance  Transfers:     Sit to Stand:  minimum assistance and moderate assistance with rolling walker and pt yelling out in pain and required increased time to complete stand. Pt modA to stand from EOB and min-modA to stand from bedside chair.    Toilet Transfer: moderate assistance with  rolling walker  using  Step Transfer and pt with 1 episode of buckling and required modA for correction.   Gait: pt amb 58ft 2x with RW min-modA. Pt with step-to gait pattern and had several episodes of buckling. Pt required min-modA to correct buckling.       Education:  Patient and spouse were provided with verbal education education regarding PT role/goals/POC, post-op precautions, fall prevention, safety awareness, and discharge/DME recommendations.  Understanding was verbalized.     Patient left up in chair with all lines intact, call button in reach, and NSG present    GOALS:   Multidisciplinary Problems       Physical Therapy Goals          Problem: Physical Therapy    Goal Priority Disciplines Outcome Goal Variances Interventions   Physical Therapy Goal     PT, PT/OT Progressing     Description: Goals to be met by: 2024     Patient will increase functional independence with mobility by performin. Supine to sit with Modified [place]  2. Sit to supine with Modified [place]  3. Sit to stand transfer with Modified [place]  4. Bed to chair transfer with Modified [place] using Least Restrictive Assistive Device  5. Gait  x 200 feet with Modified [place] using Least Restrictive Assistive Device.                          Time Tracking:     PT Received On: 24  PT Start  Time: 1054     PT Stop Time: 1119  PT Total Time (min): 25 min     Billable Minutes: Gait Training 17 and Therapeutic Activity 8    Treatment Type: Treatment  PT/PTA: PTA     Number of PTA visits since last PT visit: 2     08/22/2024

## 2024-08-23 PROCEDURE — 97110 THERAPEUTIC EXERCISES: CPT | Mod: CQ

## 2024-08-23 PROCEDURE — 25000003 PHARM REV CODE 250

## 2024-08-23 PROCEDURE — 63600175 PHARM REV CODE 636 W HCPCS: Performed by: PHYSICIAN ASSISTANT

## 2024-08-23 PROCEDURE — 97530 THERAPEUTIC ACTIVITIES: CPT

## 2024-08-23 PROCEDURE — 63600175 PHARM REV CODE 636 W HCPCS

## 2024-08-23 PROCEDURE — 25000003 PHARM REV CODE 250: Performed by: NEUROLOGICAL SURGERY

## 2024-08-23 PROCEDURE — 99900035 HC TECH TIME PER 15 MIN (STAT)

## 2024-08-23 PROCEDURE — 94799 UNLISTED PULMONARY SVC/PX: CPT

## 2024-08-23 PROCEDURE — 11000001 HC ACUTE MED/SURG PRIVATE ROOM

## 2024-08-23 PROCEDURE — 99900031 HC PATIENT EDUCATION (STAT)

## 2024-08-23 PROCEDURE — 97535 SELF CARE MNGMENT TRAINING: CPT

## 2024-08-23 PROCEDURE — 97116 GAIT TRAINING THERAPY: CPT | Mod: CQ

## 2024-08-23 RX ADMIN — DEXAMETHASONE SODIUM PHOSPHATE 6 MG: 4 INJECTION, SOLUTION INTRA-ARTICULAR; INTRALESIONAL; INTRAMUSCULAR; INTRAVENOUS; SOFT TISSUE at 05:08

## 2024-08-23 RX ADMIN — HYDROMORPHONE HYDROCHLORIDE 0.5 MG: 2 INJECTION INTRAMUSCULAR; INTRAVENOUS; SUBCUTANEOUS at 10:08

## 2024-08-23 RX ADMIN — LISINOPRIL 20 MG: 20 TABLET ORAL at 08:08

## 2024-08-23 RX ADMIN — HYDROMORPHONE HYDROCHLORIDE 0.5 MG: 2 INJECTION INTRAMUSCULAR; INTRAVENOUS; SUBCUTANEOUS at 05:08

## 2024-08-23 RX ADMIN — DIAZEPAM 10 MG: 5 TABLET ORAL at 05:08

## 2024-08-23 RX ADMIN — HYDROCHLOROTHIAZIDE 25 MG: 25 TABLET ORAL at 08:08

## 2024-08-23 RX ADMIN — HYDROCODONE BITARTRATE AND ACETAMINOPHEN 1 TABLET: 5; 325 TABLET ORAL at 04:08

## 2024-08-23 RX ADMIN — DEXAMETHASONE SODIUM PHOSPHATE 6 MG: 4 INJECTION, SOLUTION INTRA-ARTICULAR; INTRALESIONAL; INTRAMUSCULAR; INTRAVENOUS; SOFT TISSUE at 11:08

## 2024-08-23 RX ADMIN — DIAZEPAM 10 MG: 5 TABLET ORAL at 12:08

## 2024-08-23 RX ADMIN — DEXAMETHASONE SODIUM PHOSPHATE 6 MG: 4 INJECTION, SOLUTION INTRA-ARTICULAR; INTRALESIONAL; INTRAMUSCULAR; INTRAVENOUS; SOFT TISSUE at 12:08

## 2024-08-23 RX ADMIN — ATORVASTATIN CALCIUM 40 MG: 40 TABLET, FILM COATED ORAL at 08:08

## 2024-08-23 RX ADMIN — CYCLOBENZAPRINE 10 MG: 10 TABLET, FILM COATED ORAL at 04:08

## 2024-08-23 RX ADMIN — SENNOSIDES AND DOCUSATE SODIUM 2 TABLET: 50; 8.6 TABLET ORAL at 08:08

## 2024-08-23 RX ADMIN — ENOXAPARIN SODIUM 40 MG: 40 INJECTION SUBCUTANEOUS at 04:08

## 2024-08-23 RX ADMIN — HYDROMORPHONE HYDROCHLORIDE 0.5 MG: 2 INJECTION INTRAMUSCULAR; INTRAVENOUS; SUBCUTANEOUS at 08:08

## 2024-08-23 RX ADMIN — OXYCODONE AND ACETAMINOPHEN 1 TABLET: 10; 325 TABLET ORAL at 02:08

## 2024-08-23 RX ADMIN — HYDROCODONE BITARTRATE AND ACETAMINOPHEN 1 TABLET: 10; 325 TABLET ORAL at 12:08

## 2024-08-23 RX ADMIN — VENLAFAXINE HYDROCHLORIDE 300 MG: 150 CAPSULE, EXTENDED RELEASE ORAL at 08:08

## 2024-08-23 RX ADMIN — TAMSULOSIN HYDROCHLORIDE 0.4 MG: 0.4 CAPSULE ORAL at 05:08

## 2024-08-23 RX ADMIN — OXYCODONE AND ACETAMINOPHEN 1 TABLET: 10; 325 TABLET ORAL at 11:08

## 2024-08-23 RX ADMIN — QUETIAPINE FUMARATE 300 MG: 300 TABLET ORAL at 08:08

## 2024-08-23 RX ADMIN — OXYCODONE AND ACETAMINOPHEN 1 TABLET: 10; 325 TABLET ORAL at 06:08

## 2024-08-23 RX ADMIN — CYCLOBENZAPRINE 10 MG: 10 TABLET, FILM COATED ORAL at 08:08

## 2024-08-23 RX ADMIN — HYDROMORPHONE HYDROCHLORIDE 0.5 MG: 2 INJECTION INTRAMUSCULAR; INTRAVENOUS; SUBCUTANEOUS at 03:08

## 2024-08-23 NOTE — PT/OT/SLP PROGRESS
Physical Therapy Treatment    Patient Name:  Lionel Nix   MRN:  [id number]    Recommendations:     Discharge therapy intensity: High Intensity Therapy , pt still having buckling episodes during ambulation. Remains a high fall risk.     Discharge Equipment Recommendations: to be determined by next level of care  Barriers to discharge: Impaired mobility    Assessment:     Lionel Nix is a 59 y.o. male admitted with a medical diagnosis of lumbosacral radiculopathy 2/2 DJD, s/p L2-S1 decompression and L3-S1 TLIF on 8/19.  .  He presents with the following impairments/functional limitations: weakness, impaired endurance, impaired functional mobility, gait instability, impaired balance, orthopedic precautions, pain .    Pt with major episodes of buckling throughout amb trial. Pt having to bear majority on weight through BUE on RW to offload BLE. If pt applies majority of weight through BLE they are unable to hold him upright and buckle immediately. NSG aware.     D/c rec changed to high intensity 2/2 pt buckling not having improved thus far and remaining a high fall risk.     Rehab Prognosis: Good; patient would benefit from acute skilled PT services to address these deficits and reach maximum level of function.    Recent Surgery: Procedure(s) (LRB):  FUSION, SPINE, LUMBAR, TLIF, USING COMPUTER-ASSISTED NAVIGATION (Left) 4 Days Post-Op    Plan:     During this hospitalization, patient would benefit from acute PT services 6 x/week to address the identified rehab impairments via gait training, therapeutic activities, therapeutic exercises and progress toward the following goals:    Plan of Care Expires:  09/20/24    Subjective     Chief Complaint:   Patient/Family Comments/goals:   Pain/Comfort:  Location 1: back  Pain Addressed 1: Reposition, Distraction  Location - Side 2: Left  Location 2: leg  Pain Addressed 2: Reposition, Distraction      Objective:     Communicated with NSG prior to session.  Patient found up in  chair with TRAVIS drain upon PT entry to room.     General Precautions: Standard, fall  Orthopedic Precautions: spinal precautions  Braces: LSO  Respiratory Status: Room air  Blood Pressure:   Skin Integrity: Visible skin intact      Functional Mobility:  Transfers:     Sit to Stand:  minimum assistance and moderate assistance with rolling walker and 6 trials from bedside chair. Vcs given for correct form.    Gait: pt amb 50ft with RW min-modA. Pt with step-to gait pattern and had 3 episodes of buckling. Pt required min-modA to correct buckling.       ~sit to stand activity completed paired with heel raises. Pt faced rail in hallway while sitting in bedside chair. Pt pulled himself up from rail (unable to push from chair at this time without increased pain). once pt standing he would perform 3 heel raises while BUE supported on rail.  Total of 6 trials. Rest required between.    Education:  Patient and spouse were provided with verbal education education regarding PT role/goals/POC, post-op precautions, fall prevention, safety awareness, and discharge/DME recommendations.  Understanding was verbalized.     Patient left up in chair with all lines intact, call button in reach, and NSG present    GOALS:   Multidisciplinary Problems       Physical Therapy Goals          Problem: Physical Therapy    Goal Priority Disciplines Outcome Goal Variances Interventions   Physical Therapy Goal     PT, PT/OT Progressing     Description: Goals to be met by: 2024     Patient will increase functional independence with mobility by performin. Supine to sit with Modified [place]  2. Sit to supine with Modified [place]  3. Sit to stand transfer with Modified [place]  4. Bed to chair transfer with Modified [place] using Least Restrictive Assistive Device  5. Gait  x 200 feet with Modified [place] using Least Restrictive Assistive Device.                          Time Tracking:     PT Received On:  08/23/24  PT Start Time: 0950     PT Stop Time: 1013  PT Total Time (min): 23 min     Billable Minutes: Gait Training 13 and Therapeutic Exercise 10    Treatment Type: Treatment  PT/PTA: PTA     Number of PTA visits since last PT visit: 3     08/23/2024

## 2024-08-23 NOTE — PT/OT/SLP PROGRESS
Occupational Therapy   Treatment    Name: Lionel Nix  MRN: [id number]  Admitting Diagnosis:  Lumbosacral radiculopathy due to degenerative joint disease of spine  4 Days Post-Op    Recommendations:     Recommended therapy intensity at discharge: High intensity therapy  Discharge Equipment Recommendations:  none  Barriers to discharge:  None    Assessment:     Lionel Nix is a 59 y.o. male with a medical diagnosis of Lumbosacral radiculopathy due to degenerative joint disease of spine, s/p L3-S1 PLIF.  He presents with excellent effort and highly motivated but still with decreased mobility and safety with ambulation from initial eval, noted LE buckling with  decrease in UE support on the walker. Pt is a high fall risk presently and would benefit from further therapy at next level of care. Performance deficits affecting function are weakness, impaired endurance, impaired self care skills, impaired functional mobility.     Rehab Prognosis:  good; patient would benefit from acute skilled OT services to address these deficits and reach maximum level of function.       Plan:     Patient to be seen 5 x/week to address the above listed problems via self-care/home management, therapeutic activities, therapeutic exercises  Plan of Care Expires: 09/17/24  Plan of Care Reviewed with: patient    Subjective     Pain/Comfort:  Pain Rating 1:  (not rated, still with some pain into L leg)    Objective:     Communicated with: nsg prior to session.  Patient found up in chair with SCD, peripheral IV, TRAVIS drain upon OT entry to room.    General Precautions: Standard, fall    Orthopedic Precautions:spinal precautions  Braces: LSO  Respiratory Status:   Vital Signs:      Occupational Performance:     Bed Mobility:        Functional Mobility/Transfers:  Sit to stand with min and CGA from chair  Functional Mobility: ambulated to sink with min assist, noted some buckling with attempts at decreased UE support; ambulated on unit x ~30 ft with  Rw with close chair follow, min assist, or mod assist when buckling noted with decreased Ue support  Activities of Daily Living:  Stood at sink to brush teeth, setup assist for items, using Ue's on GB or sink during task, min assist at times with some buckling noted with decreased UE support  Educated and demo'd throughout on all precautions related to mobility and ADL performance  Educated on new rec's for high intensity therapy      Therapeutic Activities:       Therapeutic Exercise:issued theraband and educated on UE exercises      [facility] 6 Click ADL:      Patient Education:  Patient and spouse were provided with verbal education and demonstrations education regarding OT role/goals/POC, post op precautions, safety awareness, Discharge/DME recommendations, and AE .  Patient was able to return demonstration.      Patient left up in chair with all lines intact, call button in reach, and PTA present.    GOALS:   Multidisciplinary Problems       Occupational Therapy Goals          Problem: Occupational Therapy    Goal Priority Disciplines Outcome Interventions   Occupational Therapy Goal     OT, PT/OT Progressing    Description: Goals to be met by: 9/17/24     Patient will increase functional independence with ADLs by performing:    UE Dressing with Modified [place].  LE Dressing with Modified [place].with AE prn  Grooming while standing with Modified [place].  Toileting from toilet with Modified [place] for hygiene and clothing management.   Toilet transfer to toilet with Modified [place].                         Time Tracking:     OT Date of Treatment: 08/23/24  OT Start Time: 0925  OT Stop Time: 0957  OT Total Time (min): 32 min    Billable Minutes:self care 17 min  TA 15 min    OT/KRUPA: OT     Number of KRUPA visits since last OT visit: 3    8/23/2024

## 2024-08-23 NOTE — PROGRESS NOTES
[facility] Neuro  Neurosurgery  Progress Note    Subjective:     Interval History: NAEs overnight. TRAVIS drain 20mL output. Patient reports continued lumbar pain with weakness and numbness of bilateral thighs. Notes he has been ambulating down the halls but does have continued buckling of the legs. CT lumbar done yesterday. Patient reports depression and tearfulness secondary to current condition and change in lifestyle. Reports being seen by psychiatry in [place] in the past. Has seen some relief with valium.     Post-Op Info:  Procedure(s) (LRB):  FUSION, SPINE, LUMBAR, TLIF, USING COMPUTER-ASSISTED NAVIGATION (Left)   4 Days Post-Op      Medications:  Continuous Infusions:   0.9% NaCl   Intravenous Continuous         Scheduled Meds:   atorvastatin  40 mg Oral QHS    bisacodyL  10 mg Rectal Daily    dexAMETHasone  6 mg Intravenous Q6H    diazePAM  10 mg Oral Q6H    enoxparin  40 mg Subcutaneous Daily    lisinopriL  20 mg Oral Daily    And    hydroCHLOROthiazide  25 mg Oral Daily    QUEtiapine  300 mg Oral Nightly    senna-docusate 8.6-50 mg  2 tablet Oral BID    tamsulosin  1 capsule Oral QAM    venlafaxine  300 mg Oral Daily     PRN Meds:  Current Facility-Administered Medications:     acetaminophen, 650 mg, Oral, Q6H PRN    acetaminophen, 650 mg, Oral, Q4H PRN    aluminum-magnesium hydroxide-simethicone, 30 mL, Oral, Q4H PRN    calcium carbonate, 500 mg, Oral, Daily PRN    cyclobenzaprine, 10 mg, Oral, TID PRN    HYDROcodone-acetaminophen, 1 tablet, Oral, Q4H PRN    HYDROcodone-acetaminophen, 1 tablet, Oral, Q4H PRN    HYDROmorphone, 0.5 mg, Intravenous, Q4H PRN    ondansetron, 4 mg, Oral, Q6H PRN    oxyCODONE-acetaminophen, 1 tablet, Oral, Q4H PRN    prochlorperazine, 10 mg, Intravenous, Q6H PRN     Review of Systems  Objective:     Weight: 104.3 kg (230 lb)  Body mass index is 34.97 kg/m².  Vital Signs (Most Recent):  Temp: 97.7 °F (36.5 °C) (08/23/24 0346)  Pulse: 103 (08/23/24 0346)  Resp:  "18 (08/23/24 0353)  BP: (!) 138/90 (08/23/24 0346)  SpO2: 96 % (08/23/24 0346) Vital Signs (24h Range):  Temp:  [97.7 °F (36.5 °C)-98.4 °F (36.9 °C)] 97.7 °F (36.5 °C)  Pulse:  [103-130] 103  Resp:  [16-20] 18  SpO2:  [95 %-97 %] 96 %  BP: (131-159)/(89-94) 138/90                              Closed/Suction Drain 08/19/24 1411 Tube - 1 Inferior;Medial;Right Back Bulb 10 Fr. (Active)   Site Description Bleeding 08/22/24 0800   Dressing Type Gauze 08/22/24 2000   Dressing Status Clean;Dry;Intact 08/22/24 2000   Dressing Intervention Integrity maintained 08/22/24 2000   Drainage Bloody 08/22/24 2000   Status Open to gravity drainage 08/22/24 2000   Output (mL) 20 mL 08/23/24 0530       Neurosurgery Physical Exam  Awake, alert, oriented.   NAD. Resting in bed.   4+/5 motors to bilateral lower extremity hip flexion, knee flexion/extension, and dorsi/plantar flexion. Sensation intact.   Incision CDI with steri strips overlying.     Significant Labs:  No results for input(s): "GLU", "NA", "K", "CL", "CO2", "BUN", "CREATININE", "CALCIUM", "MG" in the last 48 hours.  No results for input(s): "WBC", "HGB", "HCT", "PLT" in the last 48 hours.  No results for input(s): "LABPT", "INR", "APTT" in the last 48 hours.  Microbiology Results (last 7 days)       ** No results found for the last 168 hours. **            Significant Diagnostics:  CT Lumbar Spine Without Contrast  Order: [id number]  Status: Final result       Visible to patient: No (inaccessible in MyChart)       Next appt: None    0 Result Notes  Details    Reading Physician Reading Date Result Priority   Aimee Marlow MD  [phone] 8/22/2024 Routine     Narrative & Impression  EXAMINATION:  CT LUMBAR SPINE WITHOUT CONTRAST     CLINICAL HISTORY:  low back pain, radiculopathy, post operative;     TECHNIQUE:  Noncontrast CT images of the lumbar spine. Axial, coronal, and sagittal reformatted images were obtained. Dose length product is 920 mGycm. Automatic " exposure control, adjustment of mA/kV or iterative reconstruction technique was used to limit radiation dose.     COMPARISON:  MRI lumbar spine dated 06/21/2024     FINDINGS:  There are 4 non-rib-bearing lumbar type vertebral bodies with hypoplastic ribs at L1 and transitional type S1 vertebral body.  There are postoperative changes with laminectomies and posterior spinal fusion hardware extending from L2 through S1.  The hardware is intact.  There is no acute fracture identified.  There is grade 1 retrolisthesis of L2 over L3 with marginal osteophytes.  The spinal canal has been decompressed.  There is moderate neural foraminal narrowing bilaterally at L5-S1, mild at other levels.  There are postoperative changes in the paraspinal soft tissues with scattered subcutaneous emphysema.  A soft tissue drain is in place.     Impression:     Postoperative changes without evidence of acute abnormality.        Electronically signed by:Aimee Marlow  Date:                                            08/22/2024  Time:                                           11:11           Exam Ended: 08/22/24 07:52 CDT Last Resulted: 08/22/24 11:11 CDT             Assessment/Plan:     Active Diagnoses:    Diagnosis Date Noted POA    PRINCIPAL PROBLEM:  Lumbosacral radiculopathy due to degenerative joint disease of spine [M47.27] 08/19/2024 Yes    Neuritis or radiculitis due to rupture of lumbar intervertebral disc [M51.16] 08/19/2024 Yes    Other spondylosis with radiculopathy, lumbar region [M47.26] 08/19/2024 Yes      Problems Resolved During this Admission:     -CT lumbar with post op changes.   -PTOT  -Cotninue pain control  -DC TRAVIS drain today  -Psychiatry to see patient for reported depression  -Pending rehab.     SMOOTH Mata  Neurosurgery  [facility] - Orth[facility] Neuro

## 2024-08-23 NOTE — PLAN OF CARE
F/u with Tere at  Ortho regarding pt referral. Stated pt has been clinically accepted and they will submit for auth today.     Christel Tubbs LCSW

## 2024-08-23 NOTE — NURSING
Nurses Note -- 4 Eyes      8/23/2024   7:41 AM      Skin assessed during: Q Shift Change      [] No Altered Skin Integrity Present    []Prevention Measures Documented      [x] Yes- Altered Skin Integrity Present or Discovered   [] LDA Added if Not in Epic (Describe Wound)   [] New Altered Skin Integrity was Present on Admit and Documented in LDA   [] Wound Image Taken    Wound Care Consulted? No    Attending Nurse:  Ree Santoro RN/Staff Member:   Lesly

## 2024-08-24 PROCEDURE — 99900031 HC PATIENT EDUCATION (STAT)

## 2024-08-24 PROCEDURE — 97116 GAIT TRAINING THERAPY: CPT | Mod: CQ

## 2024-08-24 PROCEDURE — 94799 UNLISTED PULMONARY SVC/PX: CPT

## 2024-08-24 PROCEDURE — 94760 N-INVAS EAR/PLS OXIMETRY 1: CPT

## 2024-08-24 PROCEDURE — 25000003 PHARM REV CODE 250

## 2024-08-24 PROCEDURE — 63600175 PHARM REV CODE 636 W HCPCS

## 2024-08-24 PROCEDURE — 11000001 HC ACUTE MED/SURG PRIVATE ROOM

## 2024-08-24 PROCEDURE — 63600175 PHARM REV CODE 636 W HCPCS: Performed by: PHYSICIAN ASSISTANT

## 2024-08-24 PROCEDURE — 25000003 PHARM REV CODE 250: Performed by: NEUROLOGICAL SURGERY

## 2024-08-24 RX ORDER — HYDROMORPHONE HYDROCHLORIDE 2 MG/ML
1 INJECTION, SOLUTION INTRAMUSCULAR; INTRAVENOUS; SUBCUTANEOUS EVERY 4 HOURS PRN
Status: DISCONTINUED | OUTPATIENT
Start: 2024-08-24 | End: 2024-08-24

## 2024-08-24 RX ORDER — HYDROXYZINE HYDROCHLORIDE 25 MG/1
25 TABLET, FILM COATED ORAL 3 TIMES DAILY PRN
Status: DISCONTINUED | OUTPATIENT
Start: 2024-08-24 | End: 2024-09-04 | Stop reason: HOSPADM

## 2024-08-24 RX ORDER — OXCARBAZEPINE 150 MG/1
150 TABLET, FILM COATED ORAL 2 TIMES DAILY
Status: DISCONTINUED | OUTPATIENT
Start: 2024-08-24 | End: 2024-09-04 | Stop reason: HOSPADM

## 2024-08-24 RX ORDER — HYDROMORPHONE HYDROCHLORIDE 2 MG/ML
1 INJECTION, SOLUTION INTRAMUSCULAR; INTRAVENOUS; SUBCUTANEOUS
Status: DISCONTINUED | OUTPATIENT
Start: 2024-08-24 | End: 2024-08-28

## 2024-08-24 RX ADMIN — OXYCODONE AND ACETAMINOPHEN 1 TABLET: 10; 325 TABLET ORAL at 11:08

## 2024-08-24 RX ADMIN — OXYCODONE AND ACETAMINOPHEN 1 TABLET: 10; 325 TABLET ORAL at 03:08

## 2024-08-24 RX ADMIN — DEXAMETHASONE SODIUM PHOSPHATE 6 MG: 4 INJECTION, SOLUTION INTRA-ARTICULAR; INTRALESIONAL; INTRAMUSCULAR; INTRAVENOUS; SOFT TISSUE at 12:08

## 2024-08-24 RX ADMIN — BISACODYL 10 MG: 10 SUPPOSITORY RECTAL at 09:08

## 2024-08-24 RX ADMIN — DEXAMETHASONE SODIUM PHOSPHATE 6 MG: 4 INJECTION, SOLUTION INTRA-ARTICULAR; INTRALESIONAL; INTRAMUSCULAR; INTRAVENOUS; SOFT TISSUE at 05:08

## 2024-08-24 RX ADMIN — HYDROMORPHONE HYDROCHLORIDE 1 MG: 2 INJECTION INTRAMUSCULAR; INTRAVENOUS; SUBCUTANEOUS at 08:08

## 2024-08-24 RX ADMIN — SENNOSIDES AND DOCUSATE SODIUM 2 TABLET: 50; 8.6 TABLET ORAL at 09:08

## 2024-08-24 RX ADMIN — HYDROMORPHONE HYDROCHLORIDE 1 MG: 2 INJECTION INTRAMUSCULAR; INTRAVENOUS; SUBCUTANEOUS at 09:08

## 2024-08-24 RX ADMIN — CYCLOBENZAPRINE 10 MG: 10 TABLET, FILM COATED ORAL at 08:08

## 2024-08-24 RX ADMIN — HYDROMORPHONE HYDROCHLORIDE 1 MG: 2 INJECTION INTRAMUSCULAR; INTRAVENOUS; SUBCUTANEOUS at 05:08

## 2024-08-24 RX ADMIN — OXYCODONE AND ACETAMINOPHEN 1 TABLET: 10; 325 TABLET ORAL at 04:08

## 2024-08-24 RX ADMIN — ENOXAPARIN SODIUM 40 MG: 40 INJECTION SUBCUTANEOUS at 04:08

## 2024-08-24 RX ADMIN — SENNOSIDES AND DOCUSATE SODIUM 2 TABLET: 50; 8.6 TABLET ORAL at 08:08

## 2024-08-24 RX ADMIN — HYDROMORPHONE HYDROCHLORIDE 0.5 MG: 2 INJECTION INTRAMUSCULAR; INTRAVENOUS; SUBCUTANEOUS at 05:08

## 2024-08-24 RX ADMIN — HYDROMORPHONE HYDROCHLORIDE 1 MG: 2 INJECTION INTRAMUSCULAR; INTRAVENOUS; SUBCUTANEOUS at 12:08

## 2024-08-24 RX ADMIN — DIAZEPAM 10 MG: 5 TABLET ORAL at 05:08

## 2024-08-24 RX ADMIN — VENLAFAXINE HYDROCHLORIDE 300 MG: 150 CAPSULE, EXTENDED RELEASE ORAL at 09:08

## 2024-08-24 RX ADMIN — OXYCODONE AND ACETAMINOPHEN 1 TABLET: 10; 325 TABLET ORAL at 07:08

## 2024-08-24 RX ADMIN — OXCARBAZEPINE 150 MG: 150 TABLET, FILM COATED ORAL at 08:08

## 2024-08-24 RX ADMIN — DIAZEPAM 10 MG: 5 TABLET ORAL at 12:08

## 2024-08-24 RX ADMIN — DIAZEPAM 10 MG: 5 TABLET ORAL at 04:08

## 2024-08-24 RX ADMIN — QUETIAPINE FUMARATE 300 MG: 300 TABLET ORAL at 08:08

## 2024-08-24 RX ADMIN — HYDROCHLOROTHIAZIDE 25 MG: 25 TABLET ORAL at 09:08

## 2024-08-24 RX ADMIN — LISINOPRIL 20 MG: 20 TABLET ORAL at 09:08

## 2024-08-24 RX ADMIN — DEXAMETHASONE SODIUM PHOSPHATE 6 MG: 4 INJECTION, SOLUTION INTRA-ARTICULAR; INTRALESIONAL; INTRAMUSCULAR; INTRAVENOUS; SOFT TISSUE at 11:08

## 2024-08-24 RX ADMIN — ATORVASTATIN CALCIUM 40 MG: 40 TABLET, FILM COATED ORAL at 08:08

## 2024-08-24 NOTE — CONSULTS
"8/24/2024  Lionel Nix   1965   [id number]       Initial Psychiatric Consult    Chief complaint: "I need some help ma'am."     SUBJECTIVE:   HPI:   Lionel Nix is a 59 y.o. male with past psychiatric history of bipolar disorder, anxiety, depression, and claustrophobia, currently admitted with Lumbosacral radiculopathy due to degenerative joint disease of spine.  Psychiatry has been consulted to address medication management.The patient was admitted on 8/19/24 for a multilevel open decompression with hardware placement. He is currently ordered: Quetiapine 300mg at bedtime, Valium 10mg QID PRN,  and Venlafaxine 300mg daily. He has a medical history of HTN, enlarged prostate, and "back injury," per patient.    At the time of the assessment, pt is sitting in a recliner with his son at the bedside. The patient expressed that he is going through "a lot, too much." He express that he experienced a back injury January 2024 which led to him "being out of work." Due to his current financial circumstances, he has sold his home and two cars. 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 He admits to being easily irritable, short tempered, easily agitated, on edge, low mood, sadness, ruminating thoughts, and over thinking. "I've been looking for someone here ([place]) for over three years ma'am." He states that his mentation is effecting his relationship with his family. He denies suicidal or homicidal ideations at present day. He does admit that he has had passive thoughts of death when he was in the process of "losing everything I worked hard for." He states that his appetite is "good." He states that his sleep is improving but has a longstanding history of insomnia. He denies any hallucination, delusions, illusions, or paranoid thoughts. "       Collateral:   Medical records and patients son    Past Psychiatric History:   Previous Psychiatric Hospitalizations: Denies    Previous Medication Trials: Gabapentin (for pain and states he experienced side effects), Effexor, Ambien, Clonazepam, Seroquel  Previous Suicide Attempts: Denies    History of Violence: Denies    Outpatient psychiatrist: Was established with Dr. Sutton in [facility] before moving to [place] three years ago.      Current Medications:   Home Meds: Flomax 0.4mg daily, Celecaxib 200mg, Lisinopril HCTZ 20/25mg daily, Atorvastatin 40mg, Spirolactone 50mg, Ambien 10mg take one tablet orally daily as needed, Quetiapine 300mg at bedtime and Venlafaxine 300mg daily.      Past Medical/Surgical History:   Past Medical History:   Diagnosis Date    Anxiety     Chronic right-sided low back pain     Enlarged prostate     Hyperlipidemia     Hypertension     Lumbosacral radiculopathy due to degenerative joint disease of spine     Neuritis or radiculitis due to rupture of lumbar intervertebral disc     Numbness and tingling of both feet     Numbness and tingling of both legs     Obesity     Other spondylosis with radiculopathy, lumbar region      Past Surgical History:   Procedure Laterality Date    BACK SURGERY      COLONOSCOPY      HERNIA REPAIR      SHOULDER SURGERY Left     TRANSFORAMINAL LUMBAR INTERBODY FUSION (TLIF) USING COMPUTER-ASSISTED NAVIGATION Left 8/19/2024    Procedure: FUSION, SPINE, LUMBAR, TLIF, USING COMPUTER-ASSISTED NAVIGATION;  Surgeon: Car August MD;  Location: [facility];  Service: Neurosurgery;  Laterality: Left;  L2 - S1 TRANSFORAMINAL INTERBODY FUSION / POSTERIOR LATERAL FUSION // PRONE PEDRO // DRILL // MICROSCOPE // O-ARM // DIRECT SPINE // NDM        Scheduled Meds:    atorvastatin  40 mg Oral QHS    bisacodyL  10 mg Rectal Daily    dexAMETHasone  6 mg Intravenous Q6H    diazePAM  10 mg Oral Q6H    enoxparin  40 mg Subcutaneous Daily    lisinopriL  20 mg Oral Daily  "   And    hydroCHLOROthiazide  25 mg Oral Daily    QUEtiapine  300 mg Oral Nightly    senna-docusate 8.6-50 mg  2 tablet Oral BID    tamsulosin  1 capsule Oral QAM    venlafaxine  300 mg Oral Daily      PRN Meds:   Current Facility-Administered Medications:     acetaminophen, 650 mg, Oral, Q6H PRN    acetaminophen, 650 mg, Oral, Q4H PRN    aluminum-magnesium hydroxide-simethicone, 30 mL, Oral, Q4H PRN    calcium carbonate, 500 mg, Oral, Daily PRN    cyclobenzaprine, 10 mg, Oral, TID PRN    HYDROmorphone, 1 mg, Intravenous, Q3H PRN    ondansetron, 4 mg, Oral, Q6H PRN    oxyCODONE-acetaminophen, 1 tablet, Oral, Q4H PRN    prochlorperazine, 10 mg, Intravenous, Q6H PRN   Psychotherapeutics (From admission, onward)      Start     Stop Route Frequency Ordered    08/22/24 1415  diazePAM tablet 10 mg         -- Oral Every 6 hours 08/22/24 1400    08/19/24 2100  QUEtiapine tablet 300 mg         -- Oral Nightly 08/19/24 0717    08/19/24 0800  venlafaxine 24 hr capsule 300 mg         -- Oral Daily 08/19/24 0717            Allergies:   Review of patient's allergies indicates:   Allergen Reactions    Gabapentin Anxiety and Palpitations          Substance Abuse History:   Tobacco Use: denies   Use of Alcohol: denies   Recreational Drugs:denies   Rehab History: denies       Nerurological History:   Seizures: Denies    Head trauma: Denies      Social History:  Housing Status: resides with family   Relationship Status/Sexual Orientation: Heterosexual/    Children: 3 children (1-son and 2-daughters)  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   No results found for: "PHENYTOIN", "PHENOBARB", "VALPROATE", "CBMZ"      Psychiatric Mental " Status Exam:  General Appearance: appears stated age, dressed in hospital garb, bearded, sitting in recliner, overweight  Arousal: alert  Behavior: cooperative, appropriate eye-contact, irritable  Movements and Motor Activity: no abnormal involuntary movements noted  Orientation: grossly intact, oriented to person, place, time, and situation  Speech: normal rate, rhythm, volume, tone and pitch, talkative  Mood: Anxious and Irritable  Affect: mood-congruent  Thought Process: goal-directed  Associations: intact, no loosening of associations  Thought Content and Perceptions: no suicidal or homicidal ideation, no auditory or visual hallucinations, no paranoid ideation, no ideas of reference, no evidence of delusions or psychosis  Recent and Remote Memory: grossly intact; per interview/observation with patient  Attention and Concentration: grossly intact; per interview/observation with patient  Fund of Knowledge: grossly intact; based on history, vocabulary, fund of knowledge, syntax, grammar, and content  Insight: adequate; based on understanding of severity of illness and HPI  Judgment: adequate; based on patient's behavior and HPI    ASSESSMENT/PLAN:   Bipolar Disorder F31.9  Insomnia F51.05  Anxiety Disorder F41.9  Depression, unspecified F32.A  Irritability and Agitation R45.4    Past Medical History:   Diagnosis Date    Anxiety     Chronic right-sided low back pain     Enlarged prostate     Hyperlipidemia     Hypertension     Lumbosacral radiculopathy due to degenerative joint disease of spine     Neuritis or radiculitis due to rupture of lumbar intervertebral disc     Numbness and tingling of both feet     Numbness and tingling of both legs     Obesity     Other spondylosis with radiculopathy, lumbar region         Recommendations:   Medication management  Continue Effexor XR 300mg orally daily   Continue Seroquel 300mg orally at bedtime  Add Trileptal 150mg take one tablet orally BID  Educated patient regarding  onset of medications   Ambien  Discussed with patient and patient nurse   Outpatient basis the patient cannot be prescribed a benzodiazepine and opioid concurrently. Would recommend decreasing the frequency to twice daily PRN and discharging on once daily PRN if opioids will be prescribed longevity (can follow up with a provider to wean off Ambien)  Add Hydroxyzine 25mg tablet take one tablet three times a day as needed for intense anxiety episodes   Consult CM for follow-ups pending discharge   Refer to therapist (pending discharge)  [facility] [phone]  Refer to karrie for medication management   (Phone)[phone] and (Fax) [phone]  Psych will sign off. Reconsult if needed       LEANA RED, ANGIEP-BC

## 2024-08-24 NOTE — NURSING
Spoke with Tomasz borges am about patient's pain, patient very upset because his pain is not controlled; patient cannot take Gabapentin; therefore is not currently taking any medication for neuropathic pain and he had a recent surgery; also patient requested to talk to Psych due to his anxiety and depression; patient has had a lot of life events and would like medication adjustments to help him.

## 2024-08-24 NOTE — NURSING
Nurses Note -- 4 Eyes      8/24/2024   10:30 AM      Skin assessed during: Daily Assessment      [] No Altered Skin Integrity Present    []Prevention Measures Documented      [x] Yes- Altered Skin Integrity Present or Discovered   [] LDA Added if Not in Epic (Describe Wound)   [x] New Altered Skin Integrity was Present on Admit and Documented in LDA   [] Wound Image Taken    Wound Care Consulted? No- surgical incison    Attending Nurse:  Danielle Santoro RN/Staff Member:   jose

## 2024-08-24 NOTE — PT/OT/SLP PROGRESS
Physical Therapy Treatment    Patient Name:  Lionel Nix   MRN:  [id number]    Recommendations:     Discharge therapy intensity: High Intensity Therapy   Discharge Equipment Recommendations: to be determined by next level of care  Barriers to discharge: Decreased caregiver support, Impaired mobility, and Ongoing medical needs    Assessment:     Lionel Nix is a 59 y.o. male.  He presents with the following impairments/functional limitations: weakness, impaired endurance, impaired functional mobility, gait instability, impaired balance, orthopedic precautions, pain.    Rehab Prognosis: Good; patient would benefit from acute skilled PT services to address these deficits and reach maximum level of function.    Recent Surgery: Procedure(s) (LRB):  FUSION, SPINE, LUMBAR, TLIF, USING COMPUTER-ASSISTED NAVIGATION (Left) 5 Days Post-Op    Plan:     During this hospitalization, patient would benefit from acute PT services 6 x/week to address the identified rehab impairments via gait training, therapeutic activities, therapeutic exercises and progress toward the following goals:    Plan of Care Expires:  09/20/24    Subjective     Chief Complaint: weakness    Objective:     Communicated with nurse prior to session.  Patient found up in chair with TRAVIS drain upon PT entry to room.     General Precautions: Standard, fall  Orthopedic Precautions: spinal precautions  Braces: LSO  Respiratory Status: Room air  Blood Pressure:    Skin Integrity: Visible skin intact      Functional Mobility:  LSO on when OOB  Mod assist to stand   Gait 50 ft x 2 with bilateral knee buckle. Chair in tow to ensure safety as well as gait belt issued.     Education Provided:  Role and goals of PT, transfer training, bed mobility, gait training, balance training, safety awareness, assistive device, strengthening exercises, and importance of participating in PT to return to PLOF     Patient left up in chair with call button in reach and son  present    GOALS:   Multidisciplinary Problems       Physical Therapy Goals          Problem: Physical Therapy    Goal Priority Disciplines Outcome Goal Variances Interventions   Physical Therapy Goal     PT, PT/OT Progressing     Description: Goals to be met by: 2024     Patient will increase functional independence with mobility by performin. Supine to sit with Modified [place]  2. Sit to supine with Modified [place]  3. Sit to stand transfer with Modified [place]  4. Bed to chair transfer with Modified [place] using Least Restrictive Assistive Device  5. Gait  x 200 feet with Modified [place] using Least Restrictive Assistive Device.                          Time Tracking:     Billable Minutes: Gait Training 24    Treatment Type: Treatment  PT/PTA: PTA     Number of PTA visits since last PT visit: 4     2024

## 2024-08-24 NOTE — PROGRESS NOTES
(+) pain issues.  Cat scan shows good placement of hardware.  Increased Dilaudid and started Cymbalta

## 2024-08-25 PROCEDURE — 25000003 PHARM REV CODE 250

## 2024-08-25 PROCEDURE — 63600175 PHARM REV CODE 636 W HCPCS: Performed by: PHYSICIAN ASSISTANT

## 2024-08-25 PROCEDURE — 63600175 PHARM REV CODE 636 W HCPCS

## 2024-08-25 PROCEDURE — 94760 N-INVAS EAR/PLS OXIMETRY 1: CPT

## 2024-08-25 PROCEDURE — 97535 SELF CARE MNGMENT TRAINING: CPT | Mod: CO

## 2024-08-25 PROCEDURE — 99900035 HC TECH TIME PER 15 MIN (STAT)

## 2024-08-25 PROCEDURE — 94799 UNLISTED PULMONARY SVC/PX: CPT

## 2024-08-25 PROCEDURE — 99900031 HC PATIENT EDUCATION (STAT)

## 2024-08-25 PROCEDURE — 25000003 PHARM REV CODE 250: Performed by: NEUROLOGICAL SURGERY

## 2024-08-25 PROCEDURE — 11000001 HC ACUTE MED/SURG PRIVATE ROOM

## 2024-08-25 RX ADMIN — OXYCODONE AND ACETAMINOPHEN 1 TABLET: 10; 325 TABLET ORAL at 08:08

## 2024-08-25 RX ADMIN — DEXAMETHASONE SODIUM PHOSPHATE 6 MG: 4 INJECTION, SOLUTION INTRA-ARTICULAR; INTRALESIONAL; INTRAMUSCULAR; INTRAVENOUS; SOFT TISSUE at 11:08

## 2024-08-25 RX ADMIN — OXYCODONE AND ACETAMINOPHEN 1 TABLET: 10; 325 TABLET ORAL at 05:08

## 2024-08-25 RX ADMIN — VENLAFAXINE HYDROCHLORIDE 300 MG: 150 CAPSULE, EXTENDED RELEASE ORAL at 08:08

## 2024-08-25 RX ADMIN — HYDROMORPHONE HYDROCHLORIDE 1 MG: 2 INJECTION INTRAMUSCULAR; INTRAVENOUS; SUBCUTANEOUS at 11:08

## 2024-08-25 RX ADMIN — HYDROMORPHONE HYDROCHLORIDE 1 MG: 2 INJECTION INTRAMUSCULAR; INTRAVENOUS; SUBCUTANEOUS at 01:08

## 2024-08-25 RX ADMIN — DIAZEPAM 10 MG: 5 TABLET ORAL at 12:08

## 2024-08-25 RX ADMIN — ENOXAPARIN SODIUM 40 MG: 40 INJECTION SUBCUTANEOUS at 04:08

## 2024-08-25 RX ADMIN — OXCARBAZEPINE 150 MG: 150 TABLET, FILM COATED ORAL at 08:08

## 2024-08-25 RX ADMIN — DEXAMETHASONE SODIUM PHOSPHATE 6 MG: 4 INJECTION, SOLUTION INTRA-ARTICULAR; INTRALESIONAL; INTRAMUSCULAR; INTRAVENOUS; SOFT TISSUE at 05:08

## 2024-08-25 RX ADMIN — DIAZEPAM 10 MG: 5 TABLET ORAL at 11:08

## 2024-08-25 RX ADMIN — ATORVASTATIN CALCIUM 40 MG: 40 TABLET, FILM COATED ORAL at 08:08

## 2024-08-25 RX ADMIN — OXYCODONE AND ACETAMINOPHEN 1 TABLET: 10; 325 TABLET ORAL at 04:08

## 2024-08-25 RX ADMIN — TAMSULOSIN HYDROCHLORIDE 0.4 MG: 0.4 CAPSULE ORAL at 06:08

## 2024-08-25 RX ADMIN — LISINOPRIL 20 MG: 20 TABLET ORAL at 08:08

## 2024-08-25 RX ADMIN — DIAZEPAM 10 MG: 5 TABLET ORAL at 06:08

## 2024-08-25 RX ADMIN — SENNOSIDES AND DOCUSATE SODIUM 2 TABLET: 50; 8.6 TABLET ORAL at 08:08

## 2024-08-25 RX ADMIN — OXYCODONE AND ACETAMINOPHEN 1 TABLET: 10; 325 TABLET ORAL at 12:08

## 2024-08-25 RX ADMIN — HYDROMORPHONE HYDROCHLORIDE 1 MG: 2 INJECTION INTRAMUSCULAR; INTRAVENOUS; SUBCUTANEOUS at 03:08

## 2024-08-25 RX ADMIN — BISACODYL 10 MG: 10 SUPPOSITORY RECTAL at 08:08

## 2024-08-25 RX ADMIN — HYDROMORPHONE HYDROCHLORIDE 1 MG: 2 INJECTION INTRAMUSCULAR; INTRAVENOUS; SUBCUTANEOUS at 05:08

## 2024-08-25 RX ADMIN — HYDROMORPHONE HYDROCHLORIDE 1 MG: 2 INJECTION INTRAMUSCULAR; INTRAVENOUS; SUBCUTANEOUS at 07:08

## 2024-08-25 RX ADMIN — HYDROMORPHONE HYDROCHLORIDE 1 MG: 2 INJECTION INTRAMUSCULAR; INTRAVENOUS; SUBCUTANEOUS at 10:08

## 2024-08-25 RX ADMIN — CYCLOBENZAPRINE 10 MG: 10 TABLET, FILM COATED ORAL at 01:08

## 2024-08-25 RX ADMIN — DIAZEPAM 10 MG: 5 TABLET ORAL at 05:08

## 2024-08-25 RX ADMIN — QUETIAPINE FUMARATE 300 MG: 300 TABLET ORAL at 08:08

## 2024-08-25 RX ADMIN — DEXAMETHASONE SODIUM PHOSPHATE 6 MG: 4 INJECTION, SOLUTION INTRA-ARTICULAR; INTRALESIONAL; INTRAMUSCULAR; INTRAVENOUS; SOFT TISSUE at 12:08

## 2024-08-25 RX ADMIN — HYDROCHLOROTHIAZIDE 25 MG: 25 TABLET ORAL at 08:08

## 2024-08-25 NOTE — NURSING
Nurses Note -- 4 Eyes      8/25/2024   12:02 PM      Skin assessed during: Daily Assessment      [] No Altered Skin Integrity Present    []Prevention Measures Documented      [x] Yes- Altered Skin Integrity Present or Discovered   [x] LDA Added if Not in Epic (Describe Wound)   [] New Altered Skin Integrity was Present on Admit and Documented in LDA   [x] Wound Image Taken    Wound Care Consulted? No- healing surgical incision    Attending Nurse:  Danielle Santoro RN/Staff Member:   jose

## 2024-08-25 NOTE — PT/OT/SLP PROGRESS
Occupational Therapy   Treatment    Name: Lionel Nix  MRN: [id number]  Admitting Diagnosis:  Lumbosacral radiculopathy due to degenerative joint disease of spine      Recommendations:     Recommended therapy intensity at discharge: High Intensity Therapy   Discharge Equipment Recommendations:  none  Barriers to discharge:       Assessment:     Lionel Nix is a 59 y.o. male with a medical diagnosis of  Lumbosacral radiculopathy due to degenerative joint disease of spine, s/p L3-S1 PLIF . Performance deficits affecting function are weakness, impaired endurance, impaired self care skills, impaired functional mobility.     Pt motivated to participate and return to PLOF. Limited by pain and states he's overwhelmed with physical deficits. Had multiple instances of buckling during mobility and relies heavily on UE support on walker. Recommending high intensity therapy at d/c.     Rehab Prognosis:  Good; patient would benefit from acute skilled OT services to address these deficits and reach maximum level of function.       Plan:     Patient to be seen 5 x/week to address the above listed problems via self-care/home management, therapeutic activities, therapeutic exercises  Plan of Care Expires: 09/17/24  Plan of Care Reviewed with: patient    Subjective     Pain/Comfort:  Location - Orientation 1: lower  Location 1: back  Pain Addressed 1: Reposition, Distraction  Location - Side 2: Bilateral  Location 2: leg (reports shooting pain down sides of legs)  Pain Addressed 2: Reposition, Distraction    Objective:     Communicated with: RN prior to session.  Patient found up in chair upon OT entry to room.    General Precautions: Standard, fall    Orthopedic Precautions:spinal precautions  Braces: LSO  Respiratory Status: Room air     Occupational Performance:      Functional Mobility/Transfers:  Patient completed Sit <> Stand Transfer with moderate assistance  with  rolling walker   Functional Mobility: ambulated to bathroom  with Min-Mod A and RW. Several instances of BLE buckling.     Activities of Daily Living:  Grooming: completed oral hygiene standing at sink with Min A. Needed assist for taking toothpaste cap off and applying to brush. Unable to let go of sink with both hands 2/2 BLE buckling with decreased UE support.  Toileting: performed toilet t/f with Mod A. Did not feel urge to void.   UE dressing: donned/doffed LSO with independence.     Therapeutic Positioning    OT interventions performed during the course of today's session in an effort to prevent and/or reduce acquired pressure injuries:   Education was provided on benefits of and recommendations for therapeutic positioning      [facility] 6 Click ADL:      Patient Education:  Patient provided with verbal education education regarding OT role/goals/POC, fall prevention, and safety awareness.  Understanding was verbalized, however additional teaching warranted.      Patient left up in chair with all lines intact and call button in reach.    GOALS:   Multidisciplinary Problems       Occupational Therapy Goals          Problem: Occupational Therapy    Goal Priority Disciplines Outcome Interventions   Occupational Therapy Goal     OT, PT/OT Progressing    Description: Goals to be met by: 9/17/24     Patient will increase functional independence with ADLs by performing:    UE Dressing with Modified [place].  LE Dressing with Modified [place].with AE prn  Grooming while standing with Modified [place].  Toileting from toilet with Modified [place] for hygiene and clothing management.   Toilet transfer to toilet with Modified [place].                         Time Tracking:     OT Date of Treatment: 08/25/24  OT Start Time: 0828  OT Stop Time: 0851  OT Total Time (min): 23 min    Billable Minutes:Self Care/Home Management 23    OT/KRUPA: KRUPA     Number of KRUPA visits since last OT visit: 4    8/25/2024

## 2024-08-26 PROCEDURE — 11000001 HC ACUTE MED/SURG PRIVATE ROOM

## 2024-08-26 PROCEDURE — 99900031 HC PATIENT EDUCATION (STAT)

## 2024-08-26 PROCEDURE — 25000003 PHARM REV CODE 250

## 2024-08-26 PROCEDURE — 94799 UNLISTED PULMONARY SVC/PX: CPT

## 2024-08-26 PROCEDURE — 63600175 PHARM REV CODE 636 W HCPCS: Performed by: PHYSICIAN ASSISTANT

## 2024-08-26 PROCEDURE — 63600175 PHARM REV CODE 636 W HCPCS

## 2024-08-26 PROCEDURE — 25000003 PHARM REV CODE 250: Performed by: NEUROLOGICAL SURGERY

## 2024-08-26 RX ADMIN — OXYCODONE AND ACETAMINOPHEN 1 TABLET: 10; 325 TABLET ORAL at 06:08

## 2024-08-26 RX ADMIN — DIAZEPAM 10 MG: 5 TABLET ORAL at 05:08

## 2024-08-26 RX ADMIN — OXYCODONE AND ACETAMINOPHEN 1 TABLET: 10; 325 TABLET ORAL at 05:08

## 2024-08-26 RX ADMIN — DEXAMETHASONE SODIUM PHOSPHATE 6 MG: 4 INJECTION, SOLUTION INTRA-ARTICULAR; INTRALESIONAL; INTRAMUSCULAR; INTRAVENOUS; SOFT TISSUE at 05:08

## 2024-08-26 RX ADMIN — HYDROMORPHONE HYDROCHLORIDE 1 MG: 2 INJECTION INTRAMUSCULAR; INTRAVENOUS; SUBCUTANEOUS at 06:08

## 2024-08-26 RX ADMIN — OXCARBAZEPINE 150 MG: 150 TABLET, FILM COATED ORAL at 08:08

## 2024-08-26 RX ADMIN — ACETAMINOPHEN 325MG 650 MG: 325 TABLET ORAL at 12:08

## 2024-08-26 RX ADMIN — ATORVASTATIN CALCIUM 40 MG: 40 TABLET, FILM COATED ORAL at 08:08

## 2024-08-26 RX ADMIN — HYDROMORPHONE HYDROCHLORIDE 1 MG: 2 INJECTION INTRAMUSCULAR; INTRAVENOUS; SUBCUTANEOUS at 04:08

## 2024-08-26 RX ADMIN — HYDROCHLOROTHIAZIDE 25 MG: 25 TABLET ORAL at 09:08

## 2024-08-26 RX ADMIN — OXYCODONE AND ACETAMINOPHEN 1 TABLET: 10; 325 TABLET ORAL at 01:08

## 2024-08-26 RX ADMIN — HYDROXYZINE HYDROCHLORIDE 25 MG: 25 TABLET, FILM COATED ORAL at 12:08

## 2024-08-26 RX ADMIN — CYCLOBENZAPRINE 10 MG: 10 TABLET, FILM COATED ORAL at 12:08

## 2024-08-26 RX ADMIN — OXCARBAZEPINE 150 MG: 150 TABLET, FILM COATED ORAL at 09:08

## 2024-08-26 RX ADMIN — SENNOSIDES AND DOCUSATE SODIUM 2 TABLET: 50; 8.6 TABLET ORAL at 09:08

## 2024-08-26 RX ADMIN — OXYCODONE AND ACETAMINOPHEN 1 TABLET: 10; 325 TABLET ORAL at 12:08

## 2024-08-26 RX ADMIN — VENLAFAXINE HYDROCHLORIDE 300 MG: 150 CAPSULE, EXTENDED RELEASE ORAL at 09:08

## 2024-08-26 RX ADMIN — QUETIAPINE FUMARATE 300 MG: 300 TABLET ORAL at 08:08

## 2024-08-26 RX ADMIN — TAMSULOSIN HYDROCHLORIDE 0.4 MG: 0.4 CAPSULE ORAL at 05:08

## 2024-08-26 RX ADMIN — HYDROMORPHONE HYDROCHLORIDE 1 MG: 2 INJECTION INTRAMUSCULAR; INTRAVENOUS; SUBCUTANEOUS at 02:08

## 2024-08-26 RX ADMIN — HYDROMORPHONE HYDROCHLORIDE 1 MG: 2 INJECTION INTRAMUSCULAR; INTRAVENOUS; SUBCUTANEOUS at 08:08

## 2024-08-26 RX ADMIN — DEXAMETHASONE SODIUM PHOSPHATE 6 MG: 4 INJECTION, SOLUTION INTRA-ARTICULAR; INTRALESIONAL; INTRAMUSCULAR; INTRAVENOUS; SOFT TISSUE at 12:08

## 2024-08-26 RX ADMIN — LISINOPRIL 20 MG: 20 TABLET ORAL at 09:08

## 2024-08-26 RX ADMIN — DIAZEPAM 10 MG: 5 TABLET ORAL at 12:08

## 2024-08-26 RX ADMIN — OXYCODONE AND ACETAMINOPHEN 1 TABLET: 10; 325 TABLET ORAL at 09:08

## 2024-08-26 RX ADMIN — OXYCODONE AND ACETAMINOPHEN 1 TABLET: 10; 325 TABLET ORAL at 10:08

## 2024-08-26 RX ADMIN — SENNOSIDES AND DOCUSATE SODIUM 2 TABLET: 50; 8.6 TABLET ORAL at 08:08

## 2024-08-26 RX ADMIN — ENOXAPARIN SODIUM 40 MG: 40 INJECTION SUBCUTANEOUS at 04:08

## 2024-08-26 NOTE — PROGRESS NOTES
"POD#7 L2-S1 decompression with L3-S1 fusion  He is sitting up in bed, NAD  He c/o left posterior leg pain s/p fall overnight. He states he asked for help going to the bathroom and when he was coming back into the room his leg "buckled" and he fell on the floor. He did have left buttock pain but this is improving.  His numbness in the right foot is unchanged    AFVSS  Shanda well, no deficits appreciated  Incision c/d/I  TRAVIS site dry    XR L spine and pelvis following fall are both negative for acute changes, no loosening of hardware seen    Plan:   Continue daily dressing changes  Continue PT/OT  SCDs and lovenox for DVT prophylaxis  Rehab placement pending; ok to transfer when accepted  "

## 2024-08-26 NOTE — PROGRESS NOTES
Inpatient Nutrition Evaluation    Admit Date: 8/19/2024   Total duration of encounter: 7 days    Nutrition Recommendation/Prescription     Continue oral diet as tolerated; Diet Adult Regular     Nutrition Assessment     Chart Review    Reason Seen: length of stay    Malnutrition Screening Tool Results   Have you recently lost weight without trying?: No  Have you been eating poorly because of a decreased appetite?: No   MST Score: 0     Diagnosis:  Post op L2-S1 decompression with L3-S1 fusion     Relevant Medical History:  bipolar disorder, anxiety, depression, and claustrophobia     Nutrition-Related Medications: Scheduled Medications:  atorvastatin, 40 mg, QHS  bisacodyL, 10 mg, Daily  dexAMETHasone, 6 mg, Q6H  diazePAM, 10 mg, Q6H  enoxparin, 40 mg, Daily  lisinopriL, 20 mg, Daily   And  hydroCHLOROthiazide, 25 mg, Daily  OXcarbazepine, 150 mg, BID  QUEtiapine, 300 mg, Nightly  senna-docusate 8.6-50 mg, 2 tablet, BID  tamsulosin, 1 capsule, QAM  venlafaxine, 300 mg, Daily    Continuous Infusions:  0.9% NaCl    PRN Medications:    atorvastatin tablet 40 mg    bisacodyL suppository 10 mg    dexAMETHasone injection 6 mg    diazePAM tablet 10 mg    enoxaparin injection 40 mg    lisinopriL tablet 20 mg    And    hydroCHLOROthiazide tablet 25 mg    OXcarbazepine tablet 150 mg    QUEtiapine tablet 300 mg    senna-docusate 8.6-50 mg per tablet 2 tablet    tamsulosin 24 hr capsule 0.4 mg    venlafaxine 24 hr capsule 300 mg        Nutrition-Related Labs:  Recent Labs   Lab 08/20/24  0503      K 3.0*   CALCIUM 7.8*      CO2 25   BUN 13.5   CREATININE 0.80   EGFRNORACEVR >60   GLUCOSE 118*   WBC 6.91   HGB 9.8*   HCT 27.6*        Diet Order: Diet Adult Regular  Oral Supplement Order: none  Appetite/Oral Intake: good/% of meals  Factors Affecting Nutritional Intake: none identified  Food/Caodaism/Cultural Preferences: none reported  Food Allergies: no known food allergies    Skin Integrity:  "incision  Wound(s):       Comments    8/26/24 tolerating oral diet, good appetite and intake reported    Anthropometrics    Height: 5' 8" (172.7 cm) Height Method: Stated  Last Weight: 104.3 kg (230 lb) (08/21/24 1527) Weight Method: Standard Scale  BMI (Calculated): 35  BMI Classification: obese grade II (BMI 35-39.9)        Ideal Body Weight (IBW), Male: 154 lb     % Ideal Body Weight, Male (lb): 149.35 %                          Usual Weight Provided By: patient denies unintentional weight loss    Wt Readings from Last 5 Encounters:   08/21/24 104.3 kg (230 lb)     Weight Change(s) Since Admission:  Admit Weight: 104.3 kg (230 lb) (08/07/24 1108)      Patient Education    Not applicable.    Monitoring & Evaluation     Dietitian will monitor food and beverage intake.  Nutrition Risk/Follow-Up: low (follow-up in 5-7 days)  Patients assigned 'low nutrition risk' status do not qualify for a full nutritional assessment but will be monitored and re-evaluated in a 5-7 day time period. Please consult if re-evaluation needed sooner.   "

## 2024-08-26 NOTE — CONSULTS
[facility] - Rehab Inpatient Services  Inpatient Rehab Prescreen    PATIENT INFORMATION     Assessment date/time: 8/26/24 1217    Name: Lionel Nix Phone: [phone]   Address:   [address] [place] [id number] SSN:    YOB: 1965 Age: 59 y.o. Gender: male   Race: White   Marital Status:    Advance Directives: Full code     COVERAGE INFORMATION     Patient Medicare #:      Primary Insurance Type:  Blue Cross Blue Shield/Bc[employer] All Out of State /  Secondary Insurance Type:  /    Policy #:  [id number]  Policy #:     Insurance contact name/number: ph# [phone]; Fx# [phone]; Rehab auth approved starting on 8/26/24- 9/2/24 Insurance contact name/number:    Authorization #: [id number] Authorization #:    Verified Coverage: Insurance Carrier   Pending Coverage:    Prescription Coverage:  Insurance details/comments:      PHYSICIAN/REFERRAL INFORMATION     Primary Care Physician: Rafy Poole MD Attending Physician: Thor King MD   Consulting physician/specialist:  Referring Physician:    Referring facility:  Referring contact name/phone:    Physician details/comments:      CONTACT INFORMATION   Extended Emergency Contact Information  Primary Emergency Contact: Khloe Nix  Mobile Phone: [phone]  Relation: Spouse   needed? No    PRIOR LIVING SITUATION    Patient lives with wife in Daughter's single story home with a threshold entrance     Bedroom location/setup: single story    Bathroom location/setup: walk-in shower without bench or railing, and normal height commode without railing   Equipment at home: rollator walker, cane   Prior device use:  cane     PRIOR LEVEL OF FUNCTION     Did a helper need to assist with the following activities prior to the current illness, exacerbation, or injury?   Self-care:  No, independent 7 months ago   Indoor mobility: No, independent 7 months ago    Stairs: No, independent 7 months ago    Functional Cognition: No, independent    Comments: Patient was completely independent for mobility and all ADLs without use of equipment 7 months ago.    Caregivers providing assistance: Khloe Nix- spouse- [phone]   Pre-hospital home care service: none Name of Agency:    Home/personal responsibilities: Personal ADLs, working, driving, managing finances, and managing medications. Wife and daughter manage home, cooking, and cleaning.   Pre-hospital vocational category: Employed   Pre-hospital vocational effort: Employed full time, patient has been on disability for last 7 months through work   Occupation/profession:  Return to work/school plan:    Educational history:    Hobbies/leisure activities:  Resources used prior to admission:    Available resources:  Resource information comments:      REHABILITATION DIAGNOSIS     History of present illness: This is a 59 year old male with a PMH of HTN, HLD, severe lumbar stenosis with lumbosacral radiculopathy and degenerative joint disease of lumbar spine who admitted to [facility] on 8/19/24, and underwent a TLIF L2-S1 decompression and fusion by Dr. August. Drain was placed to site. Patient placed on spinal precautions with LSO brace. On 8/20, PT/OT evals completed with impairments noted of weakness, impaired balance, orthopedic precautions, pain, impaired self care skills, impaired endurance, and impairedfunctional mobility. Labs showed low RBC of 3.09, low H&H of 9.8 & 27.6, low potassium of 3.0, elevated glucose of 118, low calcium of 7.8. On 8/21, patient complained of right posterior leg pain which worsened with sitting. TRAVIS drain remained in place. Decadron was increased. On 8/22, patient complained of continued leg pain and numbness so CT of lumbar spine was completed which showed postoperative changes without evidence of acute abnormality. Patient had continued buckling of BLE with therapy noted with high risk for falls. On 8/23, TRAVIS drain had 20ml output so drain  was removed. Patient reported depression due to current situation so elijah was consulted. On 8/24, psych was consulted for depression and anxiety with recommendation to continue Effexor XR 300mg daily, continue seroquel 300mg at bedtime, added Trileptal 150mg BID, added Ambien PRN, and added Hydroxyzine 25mg TID PRN intense anxiety episodes. Patient will also need outpatient followup with therapist. On 8/25, patient fell after using restroom, Dr August was notified of fall, and Xrays ordered. On 8/26, Lumbar spine XR showed Postoperative changes of posterior fusion with intact hardware. Sacrum XR showed No fractures or dislocations are clearly identified, Degenerative changes. Patient complained of back pain rating at 10 on 1-10 scale.   Prior to hospitalization, patient was living with wife in a single story home with 1 step to enter, has a walk-in shower without railing or bench, and a normal height commode. Patient was modified independent for mobility with use of cane, and was independent for most ADLs but did require setup to minimal assist for lower body dressing due to back pain. Currently, patient is AAOx4; minimal to moderate assist for transfers with RW, walked 50ft with RW at minimal to moderate assist with 3 episode of knee buckling noted, setup assist for grooming, modified independent for toilet hygiene but required moderate assist for clothing management, total assist for lower body dressing, and setup for eating and grooming while seated. Pt. Requires acute inpatient rehab admission with 24-hour nursing and active physician oversight to monitor and manage acute medical comorbid conditions, labs, pain, and functional deficits.  Patient/family will also require teaching and integration of improving functional skills into daily living.  He will also require an individualized, interdisciplinary approach to his care, receiving PT, OT services 3 hours per day, 5 days per week.    Required care cannot be  provided at a lower level of care. Patient is anticipated to require approximately 10-12 days LOS with expected discharge home with spouse with HH   Impairment group (IGC):   Other Orthopaedic 08.9 Etiologic diagnosis/description: Severe lumbar stenosis with radiculopathy and spondylosis s/p TLIF L2-S1 decompression and fusion    Date of onset:  8/16/24 Date of surgery: 8/19/24   Allergies: Gabapentin   Comorbid condition: Anemia, back pain, depression, anxiety, hypertension, hyperlipidemia    Medical/functional conditions requiring inpatient rehabilitation: This patient requires medical management/24-hour nursing of complex   comorbidities ( Severe lumbar stenosis with radiculopathy and spondylosis s/p TLIF L2-S1 decompression and fusion, Anemia, back pain, depression, hypertension, hyperlipidemia), labs, medications (see medications list), pain, sleep   hygiene, anticoagulation, nutrition, hydration, neurological,   pulmonary, cardiac status, and preventive healthcare.        This patient requires intense therapy and an integrated,   interdisciplinary approach to address safety, impaired mobility,   impaired ADL's (dressing, toileting, grooming, showering), surgical wound care, pulmonary insufficiency, bowel/bladder problems,   preventive healthcare, medication management, integration of   improving functional skills into daily living, and home caregiver   support and training.   Risk for medical/clinical complications: This patient is at risk for the following complications: DVT/PE, pneumonia, fall,  malnutrition, neurological decline, respiratory insufficiency,   worsening activity intolerance, complications from anticoagulation,   Infection, skin breakdown, inadequate   sleep, and constipation.     SPECIAL REHABILITATION NEEDS     IV: 20G right FA Preferred Language: english         Peripheral IV - Single Lumen 08/24/24 1717 20 G No Anterior;Right Forearm (Active)   Site Assessment Clean;Dry;Intact;No  "redness;No swelling;No warmth;No drainage 08/26/24 0721   Extremity Assessment Distal to IV No abnormal discoloration;No redness;No swelling;Other (see comments) 08/25/24 2000   Line Status Capped;Saline locked 08/26/24 0721   Dressing Status Clean;Dry;Intact 08/26/24 0721   Dressing Intervention Integrity maintained 08/26/24 0721          PRECAUTIONS     Safety/fall precautions: High fall risk and Spine precautions: LSO brace     PAST MEDICAL, SOCIAL, FAMILY HISTORY     Pertinent past medical history:   Past Medical History:   Diagnosis Date    Anxiety     Chronic right-sided low back pain     Enlarged prostate     Hyperlipidemia     Hypertension     Lumbosacral radiculopathy due to degenerative joint disease of spine     Neuritis or radiculitis due to rupture of lumbar intervertebral disc     Numbness and tingling of both feet     Numbness and tingling of both legs     Obesity     Other spondylosis with radiculopathy, lumbar region       Has the patient had two or more falls in the past year or any fall with injury in the past year: Yes    Has the patient had major surgery during the 100 days prior to admission: Yes    Family Medical History: No family history on file.   Substance use history:   Social History     Substance and Sexual Activity   Alcohol Use Never     Social History     Tobacco Use   Smoking Status Never   Smokeless Tobacco Never     Social History     Substance and Sexual Activity   Drug Use Never      VITALS   BP:  (!) 151/96     Temp: 97.6 °F (36.4 °C)     HR: (!) 131     Resp: 18     SpO2: 97 %     Height: 5' 8" (1.727 m)     Weight: 104.3 kg (230 lb)     BMI: 35          MED/LABS/DIAGNOSITICS   No current facility-administered medications for this encounter.     No current outpatient medications on file.     Facility-Administered Medications Ordered in Other Encounters   Medication Dose Route Frequency Provider Last Rate Last Admin    0.9%  NaCl infusion   Intravenous Continuous Day, " SMOOTH Sánchez        acetaminophen tablet 650 mg  650 mg Oral Q6H PRN Day, SMOOTH Sánchez        acetaminophen tablet 650 mg  650 mg Oral Q4H PRN Day, SMOOTH Sánchez   650 mg at 08/26/24 1208    aluminum-magnesium hydroxide-simethicone 200-200-20 mg/5 mL suspension 30 mL  30 mL Oral Q4H PRN Day, SMOOTH Sánchez        atorvastatin tablet 40 mg  40 mg Oral QHS Day, SMOOTH Sánchez   40 mg at 08/25/24 2036    bisacodyL suppository 10 mg  10 mg Rectal Daily Day, SMOOTH Sánchez   10 mg at 08/25/24 0857    calcium carbonate 200 mg calcium (500 mg) chewable tablet 500 mg  500 mg Oral Daily PRN Day, SMOOTH Sánchez        cyclobenzaprine tablet 10 mg  10 mg Oral TID PRN Day, SMOOTH Sánchez   10 mg at 08/26/24 1208    dexAMETHasone injection 6 mg  6 mg Intravenous Q6H Anita Rodríguez PA   6 mg at 08/26/24 1207    diazePAM tablet 10 mg  10 mg Oral Q6H Car August MD   10 mg at 08/26/24 1208    enoxaparin injection 40 mg  40 mg Subcutaneous Daily Day, SMOOTH Sánchez   40 mg at 08/25/24 1629    lisinopriL tablet 20 mg  20 mg Oral Daily Day, Gonzalo   20 mg at 08/26/24 0936    And    hydroCHLOROthiazide tablet 25 mg  25 mg Oral Daily Day, Gonzalo   25 mg at 08/26/24 0935    HYDROmorphone (PF) injection 1 mg  1 mg Intravenous Q3H PRN Day, SMOOTH Sánchez   1 mg at 08/26/24 0635    hydrOXYzine HCL tablet 25 mg  25 mg Oral TID PRN Maribel Tran NP   25 mg at 08/26/24 0057    ondansetron disintegrating tablet 4 mg  4 mg Oral Q6H PRN Day, SMOOTH Sánchez   4 mg at 08/19/24 1629    OXcarbazepine tablet 150 mg  150 mg Oral BID Maribel Tran NP   150 mg at 08/26/24 0935    oxyCODONE-acetaminophen  mg per tablet 1 tablet  1 tablet Oral Q4H PRN Day, SMOOTH Sánchez   1 tablet at 08/26/24 0935    prochlorperazine injection Soln 10 mg  10 mg Intravenous Q6H PRN Day, SMOOTH Sánchez        QUEtiapine tablet 300 mg  300 mg Oral Nightly Day, SMOOTH Sánchez   300 mg at 08/25/24 2036    senna-docusate 8.6-50 mg per tablet 2  tablet  2 tablet Oral BID Day, SMOOTH Sánchez   2 tablet at 08/26/24 0935    tamsulosin 24 hr capsule 0.4 mg  1 capsule Oral QAM Day, SMOOTH Sánchez   0.4 mg at 08/26/24 0529    venlafaxine 24 hr capsule 300 mg  300 mg Oral Daily Day, SMOOTH Sánchez   300 mg at 08/26/24 0935      Pertinent lab results:   Lab Results   Component Value Date    WBC 6.91 08/20/2024    HGB 9.8 (L) 08/20/2024    HCT 27.6 (L) 08/20/2024     08/20/2024     08/20/2024    K 3.0 (L) 08/20/2024    BUN 13.5 08/20/2024    CO2 25 08/20/2024     08/20/2024      Pertinent diagnostic studies:    Additional labs and diagnostic studies required prior to admission:      QI: GG Care Tool     QI: GG Care Tool  Therapy Evaluation   Swallowing/  Nutritional Status   Diet/Feeding/  Swallowing Setup/ supervision    Eating       Oral Hygiene   Grooming completed oral hygiene standing at sink with Min A. Needed assist for taking toothpaste cap off and applying to brush. Unable to let go of sink with both hands 2/2 BLE buckling with decreased UE support.    Toileting Hygiene       Shower/Bathe   Bathing    Upper Body Dressing   Dressing Upper donned/doffed LSO with independence.    Lower Body Dressing   Dressing Lower Socks with total assist    Putting On/Taking Off Footwear       Toilet Transfer   Toileting performed toilet t/f with Mod A. Did not feel urge to void.    Bladder Continence Status   Bladder     Bowel Continence Status   Bowel     Roll Left and Right   Bed Mobility Scooting: stand by assistance  Supine to Sit: minimum assistance   Sit to Lying       Lying to Sitting on Side of Bed       Sit to Stand       Chair/Bed-to- Chair   Transfers   Patient completed Sit <> Stand Transfer with moderate assistance  with  rolling walker    Car Transfer       Walk 10 Feet   Equipment      Walk 50 Feet with Two Turns   Balance CGA standing    Walk 150 Feet   Endurance    Walk 10 Feet on Uneven Surfaces   Gait ambulated to bathroom with Min-Mod A  and RW. Several instances of BLE buckling.    Picking up an Object       Wheel 50 Feet with Two Turns   Wheelchair    Wheel 150 Feet       1 Step (Curb)   Stairs    4 Steps       12 Steps       Expression of Ideas and Wants   Communication Independent    Understanding  Verbal and Non-Verbal Content       Cognitive Patterns   Cognition Independent       Safety Precautions       Lower Extremity      Strength RLE Strength: 4/5 grossly  LLE Strength: WFL      ROM RLE ROM: WFL  LLE ROM: WFL      Upper Extremity      Strength Right Upper Extremity:   wfl     Left Upper Extremity:  wfl      ROM Right Upper Extremity:   wfl     Left Upper Extremity:  wfl     Care Score Value Definitions  6: Independent. [place] provides no assistance with tasks. A device may or may not have been used.  5: Set-up or clean-up assistance. [place] sets up or cleans up, but does not assist with tasks. [place] may have assisted prior to or following the activity.  4: Supervision or touching assistance. [place] provides verbal cues or touching/steadying or contact guard assistance. Assistance may be provided throughout the activity or intermittently.  3: Partial/moderate assistance. [place] does less than half the effort. [place] lifts, holds, or supports trunk or limbs, but provides less than half the effort.  2: Substantial/maximal assistance. [place] does more than half the effort. [place] lifts or holds trunk or limbs, and provides more than half the effort.  1: Dependent. [place] does all of the effort, or the assistance of two or more helpers is required for the patient to complete the activity.  Care Score Activity Not Attempted Value Definitions  7: Patient refused.  9: Not applicable. Not attempted and the patient did not perform this activity prior to the current illness, exacerbation, or injury.  10: Not attempted due to environmental limitations (e.g., lack of equipment, weather constraints).  88: Not attempted due to medical condition or  safety concerns.    DISCHARGE GOALS/ANTICPATED INTERVENTIONS/SERVICES     Expected Level of Improvement for Safe Discharge: Patient/caregivers anticipate discharge home at or near baseline level of functioning and/or decreased burden of care.    Patient/Family/Caregiver Goals: Patient desires to increase current level of functioning to modified independence for mobility and all ADLs so that he is able to discharge home safely, and eventually return to work.   Required Treatments/Services: Rehabilitation Nursing, Dietitian/nutrition, Social , and Case Management   Required Therapy Therapy Type Min/Day Days/Week Duration of Therapy   Physical Therapy 90 5 10-12 days   Occupational Therapy 90 5 10-12 days   Speech and Language Pathology      Prosthetic/Orthotics      Recreational Therapy      Anticipated Services upon Discharge:  home health with therapy vs outpatient therapy    Additional rehabilitation needs:  none    Expected Discharge Destination:  home with wife and daughter    Barriers to Discharge: None   Discharge Support: Patient has a caregiver available, Discharge plan has been verified with patient's caregiver, and Caregiver is in agreement with the discharge plan   Patient/Family/Caregiver Orientation: Patient/family/caregiver oriented and agreeable to inpatient rehabilitation plan   Estimated Length of Stay: 12 days    Projected Admission Date: 8/27/24   Medical Prognosis: good   Physicians Review and Admission Determination:

## 2024-08-26 NOTE — PLAN OF CARE
Messaged [facility]re at  [facility] requesting update on auth status. Still pending at this time.    Christel Tubbs, LCSW    1215 Pt received auth for rehab placement. Notified nsgy; however, pt currently pending L knee x-ray. Updated Tere. Will con't to follow.

## 2024-08-26 NOTE — NURSING
Nurses Note -- 4 Eyes      8/26/2024   7:30 AM      Skin assessed during: Q Shift Change      [] No Altered Skin Integrity Present    []Prevention Measures Documented      [x] Yes- Altered Skin Integrity Present or Discovered   [] LDA Added if Not in Epic (Describe Wound)   [] New Altered Skin Integrity was Present on Admit and Documented in LDA   [] Wound Image Taken    Wound Care Consulted? No    Attending Nurse:  Ree Santoro RN/Staff Member:   Joseph

## 2024-08-26 NOTE — NURSING
Nurses Note -- 4 Eyes      8/26/2024   2:12 AM      Skin assessed during: Q Shift Change      [] No Altered Skin Integrity Present    []Prevention Measures Documented      [x] Yes- Altered Skin Integrity Present or Discovered   [] LDA Added if Not in Epic (Describe Wound)   [] New Altered Skin Integrity was Present on Admit and Documented in LDA   [] Wound Image Taken    Wound Care Consulted? Yes    Attending Nurse:  MELODIE Evans    Second RN/Staff Member: MELODIE Santos

## 2024-08-26 NOTE — NURSING
This nurse entered room to administer PRN pain meds as requested by pt. Pt presents upset with timing for meds and bathroom assistance. Nurse educated pt on scheduled medication versus PRN medication. CNA  in room at bedside assisting pt with applying back brace to ambulate to restroom at this time. Pt continues to be verbally aggressive and using foul language with staff. CNA assisted pt to restroom with walker in use and closed restroom door as requested by pt for privacy. CNA stepped out of room and nurse still present in room. Pt instructed to pull call bell for assistance and verbalized understanding. Nurse still present in room waiting on pt to call for assistance when done using the restroom. Nurse heard loud boom and seen pt push through doorway with walker and fell on his right side. Nurse called for assistance from other staff. Charge nurse presented to room and assisted pt up to bedside. Pt assessed head to toe for injuries by both nurses at bedside. No new injuries noted. Pt c/o new onset of numbness and tingling to right side. Charge nurse called Dr. August and supervision to inform of fall. This nurse spoke to Dr. Fontaine and ordered xray for lumbar and sacral imaging. Family notified by patient.

## 2024-08-26 NOTE — PT/OT/SLP PROGRESS
Attempted to see pt for PT tx. Pt reported falling last night and has further imaging to complete per NSG. Pt with increased pain in back,legs,neck since fall. NSG aware. PT to hold until all imaging complete and read. Pt educated on safety during conf, verbalized understanding. Time spent: 1540-9349.

## 2024-08-27 PROCEDURE — 11000001 HC ACUTE MED/SURG PRIVATE ROOM

## 2024-08-27 PROCEDURE — 25000003 PHARM REV CODE 250

## 2024-08-27 PROCEDURE — 25000003 PHARM REV CODE 250: Performed by: NEUROLOGICAL SURGERY

## 2024-08-27 PROCEDURE — 94799 UNLISTED PULMONARY SVC/PX: CPT

## 2024-08-27 PROCEDURE — 99900031 HC PATIENT EDUCATION (STAT)

## 2024-08-27 PROCEDURE — 99900035 HC TECH TIME PER 15 MIN (STAT)

## 2024-08-27 PROCEDURE — 27000221 HC OXYGEN, UP TO 24 HOURS

## 2024-08-27 PROCEDURE — 63600175 PHARM REV CODE 636 W HCPCS

## 2024-08-27 PROCEDURE — 63600175 PHARM REV CODE 636 W HCPCS: Performed by: PHYSICIAN ASSISTANT

## 2024-08-27 PROCEDURE — 63600175 PHARM REV CODE 636 W HCPCS: Performed by: NURSE PRACTITIONER

## 2024-08-27 RX ORDER — BACLOFEN 10 MG/1
10 TABLET ORAL 3 TIMES DAILY
Status: DISCONTINUED | OUTPATIENT
Start: 2024-08-27 | End: 2024-09-04 | Stop reason: HOSPADM

## 2024-08-27 RX ORDER — KETOROLAC TROMETHAMINE 30 MG/ML
15 INJECTION, SOLUTION INTRAMUSCULAR; INTRAVENOUS ONCE
Status: COMPLETED | OUTPATIENT
Start: 2024-08-27 | End: 2024-08-27

## 2024-08-27 RX ADMIN — BACLOFEN 10 MG: 10 TABLET ORAL at 08:08

## 2024-08-27 RX ADMIN — DIAZEPAM 10 MG: 5 TABLET ORAL at 05:08

## 2024-08-27 RX ADMIN — HYDROMORPHONE HYDROCHLORIDE 1 MG: 2 INJECTION INTRAMUSCULAR; INTRAVENOUS; SUBCUTANEOUS at 10:08

## 2024-08-27 RX ADMIN — LISINOPRIL 20 MG: 20 TABLET ORAL at 10:08

## 2024-08-27 RX ADMIN — SENNOSIDES AND DOCUSATE SODIUM 2 TABLET: 50; 8.6 TABLET ORAL at 10:08

## 2024-08-27 RX ADMIN — HYDROMORPHONE HYDROCHLORIDE 1 MG: 2 INJECTION INTRAMUSCULAR; INTRAVENOUS; SUBCUTANEOUS at 06:08

## 2024-08-27 RX ADMIN — OXCARBAZEPINE 150 MG: 150 TABLET, FILM COATED ORAL at 08:08

## 2024-08-27 RX ADMIN — SENNOSIDES AND DOCUSATE SODIUM 2 TABLET: 50; 8.6 TABLET ORAL at 08:08

## 2024-08-27 RX ADMIN — DEXAMETHASONE SODIUM PHOSPHATE 6 MG: 4 INJECTION, SOLUTION INTRA-ARTICULAR; INTRALESIONAL; INTRAMUSCULAR; INTRAVENOUS; SOFT TISSUE at 05:08

## 2024-08-27 RX ADMIN — HYDROXYZINE HYDROCHLORIDE 25 MG: 25 TABLET, FILM COATED ORAL at 05:08

## 2024-08-27 RX ADMIN — OXYCODONE AND ACETAMINOPHEN 1 TABLET: 10; 325 TABLET ORAL at 05:08

## 2024-08-27 RX ADMIN — DIAZEPAM 10 MG: 5 TABLET ORAL at 11:08

## 2024-08-27 RX ADMIN — HYDROCHLOROTHIAZIDE 25 MG: 25 TABLET ORAL at 10:08

## 2024-08-27 RX ADMIN — KETOROLAC TROMETHAMINE 15 MG: 30 INJECTION, SOLUTION INTRAMUSCULAR at 11:08

## 2024-08-27 RX ADMIN — QUETIAPINE FUMARATE 300 MG: 300 TABLET ORAL at 08:08

## 2024-08-27 RX ADMIN — BISACODYL 10 MG: 10 SUPPOSITORY RECTAL at 08:08

## 2024-08-27 RX ADMIN — DEXAMETHASONE SODIUM PHOSPHATE 6 MG: 4 INJECTION, SOLUTION INTRA-ARTICULAR; INTRALESIONAL; INTRAMUSCULAR; INTRAVENOUS; SOFT TISSUE at 11:08

## 2024-08-27 RX ADMIN — HYDROMORPHONE HYDROCHLORIDE 1 MG: 2 INJECTION INTRAMUSCULAR; INTRAVENOUS; SUBCUTANEOUS at 07:08

## 2024-08-27 RX ADMIN — VENLAFAXINE HYDROCHLORIDE 300 MG: 150 CAPSULE, EXTENDED RELEASE ORAL at 10:08

## 2024-08-27 RX ADMIN — HYDROMORPHONE HYDROCHLORIDE 1 MG: 2 INJECTION INTRAMUSCULAR; INTRAVENOUS; SUBCUTANEOUS at 02:08

## 2024-08-27 RX ADMIN — DEXAMETHASONE SODIUM PHOSPHATE 6 MG: 4 INJECTION, SOLUTION INTRA-ARTICULAR; INTRALESIONAL; INTRAMUSCULAR; INTRAVENOUS; SOFT TISSUE at 01:08

## 2024-08-27 RX ADMIN — ENOXAPARIN SODIUM 40 MG: 40 INJECTION SUBCUTANEOUS at 05:08

## 2024-08-27 RX ADMIN — OXCARBAZEPINE 150 MG: 150 TABLET, FILM COATED ORAL at 10:08

## 2024-08-27 RX ADMIN — ATORVASTATIN CALCIUM 40 MG: 40 TABLET, FILM COATED ORAL at 08:08

## 2024-08-27 NOTE — NURSING
Nurses Note -- 4 Eyes      8/27/2024   10:39 AM      Skin assessed during: Q Shift Change      [] No Altered Skin Integrity Present    []Prevention Measures Documented      [x] Yes- Altered Skin Integrity Present or Discovered   [] LDA Added if Not in Epic (Describe Wound)   [] New Altered Skin Integrity was Present on Admit and Documented in LDA   [] Wound Image Taken    Wound Care Consulted? No    Attending Nurse:  Ree Santoro RN/Staff Member:   pablo

## 2024-08-27 NOTE — PROGRESS NOTES
[facility]  Neurosurgery  Progress Note    Subjective:     Interval History:     POD#8 L2-S1 decompression with L3-S1 fusion    Patient depressed, anxious, some flight of ideas at times.    Complaining of moderate neck pain.  Cervical x-rays pending.    X-ray knees and sacral unrevealing.    Just had a bowel movement.  Ambulated in room.  Sitting up in chair wearing brace.    His numbness in the right foot is unchanged     AFVSS  Shanda well, no deficits appreciated  Incision c/d/I  TRAVIS site dry        Plan:   Continue daily dressing changes  Continue PT/OT  Fall precautions   Advised to ask for help always before ambulating   SCDs and lovenox for DVT prophylaxis  Rehab placement pending; ok to transfer when accepted    1 time dose of Toradol.      Post-Op Info:  Procedure(s) (LRB):  FUSION, SPINE, LUMBAR, TLIF, USING COMPUTER-ASSISTED NAVIGATION (Left)   8 Days Post-Op      Medications:  Continuous Infusions:   0.9% NaCl   Intravenous Continuous         Scheduled Meds:   atorvastatin  40 mg Oral QHS    bisacodyL  10 mg Rectal Daily    dexAMETHasone  6 mg Intravenous Q6H    diazePAM  10 mg Oral Q6H    enoxparin  40 mg Subcutaneous Daily    lisinopriL  20 mg Oral Daily    And    hydroCHLOROthiazide  25 mg Oral Daily    ketorolac  15 mg Intravenous Once    OXcarbazepine  150 mg Oral BID    QUEtiapine  300 mg Oral Nightly    senna-docusate 8.6-50 mg  2 tablet Oral BID    tamsulosin  1 capsule Oral QAM    venlafaxine  300 mg Oral Daily     PRN Meds:  Current Facility-Administered Medications:     acetaminophen, 650 mg, Oral, Q6H PRN    acetaminophen, 650 mg, Oral, Q4H PRN    aluminum-magnesium hydroxide-simethicone, 30 mL, Oral, Q4H PRN    calcium carbonate, 500 mg, Oral, Daily PRN    cyclobenzaprine, 10 mg, Oral, TID PRN    HYDROmorphone, 1 mg, Intravenous, Q3H PRN    hydrOXYzine HCL, 25 mg, Oral, TID PRN    ondansetron, 4 mg, Oral, Q6H PRN    oxyCODONE-acetaminophen, 1 tablet, Oral, Q4H PRN    " prochlorperazine, 10 mg, Intravenous, Q6H PRN     Review of Systems  Objective:     Weight: 104.3 kg (230 lb)  Body mass index is 34.97 kg/m².  Vital Signs (Most Recent):  Temp: 98.1 °F (36.7 °C) (08/27/24 0758)  Pulse: (!) 123 (08/27/24 0758)  Resp: 18 (08/27/24 1002)  BP: (!) 153/87 (08/27/24 1001)  SpO2: 96 % (08/27/24 0758) Vital Signs (24h Range):  Temp:  [97.6 °F (36.4 °C)-98.5 °F (36.9 °C)] 98.1 °F (36.7 °C)  Pulse:  [] 123  Resp:  [18-20] 18  SpO2:  [95 %-97 %] 96 %  BP: (130-165)/(84-98) 153/87     Date 08/27/24 0700 - 08/28/24 0659   Shift 6605-4625 6334-2997 2965-9664 24 Hour Total   INTAKE   P.O. 480   480   Shift Total(mL/kg) 480(4.6)   480(4.6)   OUTPUT   Urine(mL/kg/hr) 500   500   Shift Total(mL/kg) 500(4.8)   500(4.8)   Weight (kg) 104.3 104.3 104.3 104.3                            Neurosurgery Physical Exam    Significant Labs:  No results for input(s): "GLU", "NA", "K", "CL", "CO2", "BUN", "CREATININE", "CALCIUM", "MG" in the last 48 hours.  No results for input(s): "WBC", "HGB", "HCT", "PLT" in the last 48 hours.  No results for input(s): "LABPT", "INR", "APTT" in the last 48 hours.  Microbiology Results (last 7 days)       ** No results found for the last 168 hours. **            Active Diagnoses:    Diagnosis Date Noted POA    PRINCIPAL PROBLEM:  Lumbosacral radiculopathy due to degenerative joint disease of spine [M47.27] 08/19/2024 Yes    Neuritis or radiculitis due to rupture of lumbar intervertebral disc [M51.16] 08/19/2024 Yes    Other spondylosis with radiculopathy, lumbar region [M47.26] 08/19/2024 Yes      Problems Resolved During this Admission:       Stacey Freeman, [facility]-BC  Neurosurgery  [facility]    "

## 2024-08-27 NOTE — PLAN OF CARE
F/u with Tere at  Ortho to provide update on POC. Pt pending c-spine x-ray read prior to d/c.     Christel Tubbs LCSW

## 2024-08-27 NOTE — PT/OT/SLP PROGRESS
Attempted to see pt 2x today (1056 and 1420). Pt pending cervical x-rays. PT to f/u as schedule allows once imaging is read.

## 2024-08-28 PROBLEM — M54.18 RADICULOPATHY OF SACRAL REGION: Status: ACTIVE | Noted: 2024-08-28

## 2024-08-28 PROBLEM — M51.16 NEURITIS OR RADICULITIS DUE TO RUPTURE OF LUMBAR INTERVERTEBRAL DISC: Status: RESOLVED | Noted: 2024-08-19 | Resolved: 2024-08-28

## 2024-08-28 PROBLEM — M47.26 OTHER SPONDYLOSIS WITH RADICULOPATHY, LUMBAR REGION: Status: RESOLVED | Noted: 2024-08-19 | Resolved: 2024-08-28

## 2024-08-28 PROBLEM — M47.27 LUMBOSACRAL RADICULOPATHY DUE TO DEGENERATIVE JOINT DISEASE OF SPINE: Status: RESOLVED | Noted: 2024-08-19 | Resolved: 2024-08-28

## 2024-08-28 PROCEDURE — 94799 UNLISTED PULMONARY SVC/PX: CPT

## 2024-08-28 PROCEDURE — 25000003 PHARM REV CODE 250

## 2024-08-28 PROCEDURE — 25000003 PHARM REV CODE 250: Performed by: NEUROLOGICAL SURGERY

## 2024-08-28 PROCEDURE — 27000646 HC AEROBIKA DEVICE

## 2024-08-28 PROCEDURE — 94664 DEMO&/EVAL PT USE INHALER: CPT

## 2024-08-28 PROCEDURE — 63600175 PHARM REV CODE 636 W HCPCS: Performed by: PHYSICIAN ASSISTANT

## 2024-08-28 PROCEDURE — 97530 THERAPEUTIC ACTIVITIES: CPT | Mod: CQ

## 2024-08-28 PROCEDURE — 63600175 PHARM REV CODE 636 W HCPCS: Performed by: NEUROLOGICAL SURGERY

## 2024-08-28 PROCEDURE — 99900035 HC TECH TIME PER 15 MIN (STAT)

## 2024-08-28 PROCEDURE — 63600175 PHARM REV CODE 636 W HCPCS: Performed by: NURSE PRACTITIONER

## 2024-08-28 PROCEDURE — 63600175 PHARM REV CODE 636 W HCPCS

## 2024-08-28 PROCEDURE — 11000001 HC ACUTE MED/SURG PRIVATE ROOM

## 2024-08-28 PROCEDURE — 97116 GAIT TRAINING THERAPY: CPT | Mod: CQ

## 2024-08-28 PROCEDURE — 97535 SELF CARE MNGMENT TRAINING: CPT | Mod: CO

## 2024-08-28 RX ORDER — LABETALOL HYDROCHLORIDE 5 MG/ML
20 INJECTION, SOLUTION INTRAVENOUS ONCE
Status: COMPLETED | OUTPATIENT
Start: 2024-08-28 | End: 2024-08-28

## 2024-08-28 RX ORDER — KETOROLAC TROMETHAMINE 30 MG/ML
15 INJECTION, SOLUTION INTRAMUSCULAR; INTRAVENOUS ONCE
Status: COMPLETED | OUTPATIENT
Start: 2024-08-28 | End: 2024-08-28

## 2024-08-28 RX ORDER — METOPROLOL TARTRATE 50 MG/1
50 TABLET ORAL 2 TIMES DAILY
Status: DISCONTINUED | OUTPATIENT
Start: 2024-08-28 | End: 2024-08-29

## 2024-08-28 RX ORDER — HYDRALAZINE HYDROCHLORIDE 20 MG/ML
10 INJECTION INTRAMUSCULAR; INTRAVENOUS
Status: DISCONTINUED | OUTPATIENT
Start: 2024-08-28 | End: 2024-09-04 | Stop reason: HOSPADM

## 2024-08-28 RX ADMIN — TAMSULOSIN HYDROCHLORIDE 0.4 MG: 0.4 CAPSULE ORAL at 07:08

## 2024-08-28 RX ADMIN — OXCARBAZEPINE 150 MG: 150 TABLET, FILM COATED ORAL at 09:08

## 2024-08-28 RX ADMIN — VENLAFAXINE HYDROCHLORIDE 300 MG: 150 CAPSULE, EXTENDED RELEASE ORAL at 07:08

## 2024-08-28 RX ADMIN — DIAZEPAM 10 MG: 5 TABLET ORAL at 05:08

## 2024-08-28 RX ADMIN — OXYCODONE AND ACETAMINOPHEN 1 TABLET: 10; 325 TABLET ORAL at 02:08

## 2024-08-28 RX ADMIN — OXYCODONE AND ACETAMINOPHEN 1 TABLET: 10; 325 TABLET ORAL at 07:08

## 2024-08-28 RX ADMIN — ENOXAPARIN SODIUM 40 MG: 40 INJECTION SUBCUTANEOUS at 05:08

## 2024-08-28 RX ADMIN — KETOROLAC TROMETHAMINE 15 MG: 30 INJECTION, SOLUTION INTRAMUSCULAR at 09:08

## 2024-08-28 RX ADMIN — DEXAMETHASONE SODIUM PHOSPHATE 6 MG: 4 INJECTION, SOLUTION INTRA-ARTICULAR; INTRALESIONAL; INTRAMUSCULAR; INTRAVENOUS; SOFT TISSUE at 11:08

## 2024-08-28 RX ADMIN — OXYCODONE AND ACETAMINOPHEN 1 TABLET: 10; 325 TABLET ORAL at 10:08

## 2024-08-28 RX ADMIN — CYCLOBENZAPRINE 10 MG: 10 TABLET, FILM COATED ORAL at 10:08

## 2024-08-28 RX ADMIN — DIAZEPAM 10 MG: 5 TABLET ORAL at 06:08

## 2024-08-28 RX ADMIN — OXYCODONE AND ACETAMINOPHEN 1 TABLET: 10; 325 TABLET ORAL at 03:08

## 2024-08-28 RX ADMIN — SENNOSIDES AND DOCUSATE SODIUM 2 TABLET: 50; 8.6 TABLET ORAL at 09:08

## 2024-08-28 RX ADMIN — OXYCODONE AND ACETAMINOPHEN 1 TABLET: 10; 325 TABLET ORAL at 06:08

## 2024-08-28 RX ADMIN — DIAZEPAM 10 MG: 5 TABLET ORAL at 11:08

## 2024-08-28 RX ADMIN — LISINOPRIL 20 MG: 20 TABLET ORAL at 07:08

## 2024-08-28 RX ADMIN — BACLOFEN 10 MG: 10 TABLET ORAL at 07:08

## 2024-08-28 RX ADMIN — HYDROCHLOROTHIAZIDE 25 MG: 25 TABLET ORAL at 07:08

## 2024-08-28 RX ADMIN — DEXAMETHASONE SODIUM PHOSPHATE 6 MG: 4 INJECTION, SOLUTION INTRA-ARTICULAR; INTRALESIONAL; INTRAMUSCULAR; INTRAVENOUS; SOFT TISSUE at 05:08

## 2024-08-28 RX ADMIN — BACLOFEN 10 MG: 10 TABLET ORAL at 02:08

## 2024-08-28 RX ADMIN — BISACODYL 10 MG: 10 SUPPOSITORY RECTAL at 07:08

## 2024-08-28 RX ADMIN — DEXAMETHASONE SODIUM PHOSPHATE 6 MG: 4 INJECTION, SOLUTION INTRA-ARTICULAR; INTRALESIONAL; INTRAMUSCULAR; INTRAVENOUS; SOFT TISSUE at 06:08

## 2024-08-28 RX ADMIN — OXCARBAZEPINE 150 MG: 150 TABLET, FILM COATED ORAL at 07:08

## 2024-08-28 RX ADMIN — HYDROMORPHONE HYDROCHLORIDE 1 MG: 2 INJECTION INTRAMUSCULAR; INTRAVENOUS; SUBCUTANEOUS at 05:08

## 2024-08-28 RX ADMIN — BACLOFEN 10 MG: 10 TABLET ORAL at 09:08

## 2024-08-28 RX ADMIN — LABETALOL HYDROCHLORIDE 20 MG: 5 INJECTION, SOLUTION INTRAVENOUS at 11:08

## 2024-08-28 RX ADMIN — ATORVASTATIN CALCIUM 40 MG: 40 TABLET, FILM COATED ORAL at 09:08

## 2024-08-28 RX ADMIN — KETOROLAC TROMETHAMINE 15 MG: 30 INJECTION, SOLUTION INTRAMUSCULAR at 05:08

## 2024-08-28 RX ADMIN — SENNOSIDES AND DOCUSATE SODIUM 2 TABLET: 50; 8.6 TABLET ORAL at 07:08

## 2024-08-28 RX ADMIN — QUETIAPINE FUMARATE 300 MG: 300 TABLET ORAL at 09:08

## 2024-08-28 NOTE — PLAN OF CARE
F/u with Tere at  Ortho to provide update. Pt's IV pain meds must be discontinued and he needs to work with therapy today without them in order for rehab to admit. Pt still has auth for rehab at this time.     Christel Tubbs, LCSW    2664 Notified Tere that pt's therapy notes are in. Rehab MD is now concerned about pt's BP and wants it addressed. Nurse notified.

## 2024-08-28 NOTE — PT/OT/SLP PROGRESS
Occupational Therapy   Treatment    Name: Lionel Nix  MRN: [id number]  Admitting Diagnosis:  Lumbosacral radiculopathy due to degenerative joint disease of spine  9 Days Post-Op    Recommendations:     Recommended therapy intensity at discharge: High Intensity Therapy   Discharge Equipment Recommendations:  none  Barriers to discharge:       Assessment:     Lionel Nix is a 59 y.o. male with a medical diagnosis of Lumbosacral radiculopathy due to degenerative joint disease of spine.  Performance deficits affecting function are weakness, impaired endurance, impaired self care skills, impaired functional mobility. Pt very motivated to participate and return to PLOF. Progressing well towards goals however still experiencing BLE weakness/buckling and relies heavily on UE support with walker.     Rehab Prognosis:  Good; patient would benefit from acute skilled OT services to address these deficits and reach maximum level of function.       Plan:     Patient to be seen 5 x/week to address the above listed problems via self-care/home management, therapeutic activities, therapeutic exercises  Plan of Care Expires: 09/17/24  Plan of Care Reviewed with: patient    Subjective     Pain/Comfort:  Location - Orientation 1: lower  Location 1: back  Pain Addressed 1: Reposition, Distraction, Pre-medicate for activity    Objective:     Communicated with: RN prior to session.  Patient found up in chair upon OT entry to room.    General Precautions: Standard, fall    Orthopedic Precautions:spinal precautions  Braces: LSO  Respiratory Status: Room air     Occupational Performance:     Functional Mobility/Transfers:  Patient completed Sit <> Stand Transfer with moderate assistance  with  rolling walker   Functional Mobility: ambulated to bathroom with Min A and RW. No LOB.     Activities of Daily Living:  Toileting: performed toilet t/f with Mod A and use of grab bars. Did not feel urge to void.   UE dressing: Min A to don back gown  as jayson. Independent with donning/doffing LSO.     Therapeutic Positioning    OT interventions performed during the course of today's session in an effort to prevent and/or reduce acquired pressure injuries:   Therapeutic positioning was provided at the conclusion of session to offload all bony prominences for the prevention and/or reduction of pressure injuries    AMPA 6 Click ADL:      Patient Education:  Patient provided with verbal education education regarding OT role/goals/POC, post op precautions, fall prevention, safety awareness, and Discharge/DME recommendations.  Understanding was verbalized.      Patient left up in chair with all lines intact, call button in reach, and geomat cushion.    GOALS:   Multidisciplinary Problems       Occupational Therapy Goals          Problem: Occupational Therapy    Goal Priority Disciplines Outcome Interventions   Occupational Therapy Goal     OT, PT/OT Progressing    Description: Goals to be met by: 9/17/24     Patient will increase functional independence with ADLs by performing:    UE Dressing with Modified Parker.  LE Dressing with Modified Parker.with AE prn  Grooming while standing with Modified Parker.  Toileting from toilet with Modified Parker for hygiene and clothing management.   Toilet transfer to toilet with Modified Parker.                         Time Tracking:     OT Date of Treatment: 08/28/24  OT Start Time: 0827  OT Stop Time: 0852  OT Total Time (min): 25 min    Billable Minutes:Self Care/Home Management 25    OT/KRUPA: KRUPA     Number of KRUPA visits since last OT visit: 5 8/28/2024

## 2024-08-28 NOTE — NURSING
Nurses Note -- 4 Eyes      8/28/2024   1:19 PM      Skin assessed during: Daily Assessment      [] No Altered Skin Integrity Present    []Prevention Measures Documented      [x] Yes- Altered Skin Integrity Present or Discovered   [] LDA Added if Not in Epic (Describe Wound)   [] New Altered Skin Integrity was Present on Admit and Documented in LDA   [x] Wound Image Taken    Wound Care Consulted? No surgical incision    Attending Nurse:  Danielle Santoro RN/Staff Member:   [place]

## 2024-08-28 NOTE — PLAN OF CARE
Problem: Infection  Goal: Absence of Infection Signs and Symptoms  Outcome: Progressing     Problem: Adult Inpatient Plan of Care  Goal: Plan of Care Review  Outcome: Progressing  Goal: Patient-Specific Goal (Individualized)  Outcome: Progressing  Goal: Absence of Hospital-Acquired Illness or Injury  Outcome: Progressing  Goal: Optimal Comfort and Wellbeing  Outcome: Progressing  Goal: Readiness for Transition of Care  Outcome: Progressing     Problem: Wound  Goal: Optimal Coping  Outcome: Progressing  Goal: Optimal Functional Ability  Outcome: Progressing  Goal: Absence of Infection Signs and Symptoms  Outcome: Progressing  Goal: Improved Oral Intake  Outcome: Progressing  Goal: Optimal Pain Control and Function  Outcome: Progressing  Goal: Skin Health and Integrity  Outcome: Progressing  Goal: Optimal Wound Healing  Outcome: Progressing     Problem: Skin Injury Risk Increased  Goal: Skin Health and Integrity  Outcome: Progressing

## 2024-08-28 NOTE — PT/OT/SLP PROGRESS
Physical Therapy Treatment    Patient Name:  Lionel Nix   MRN:  [id number]    Recommendations:     Discharge therapy intensity: High Intensity Therapy ,    Discharge Equipment Recommendations: to be determined by next level of care  Barriers to discharge: Impaired mobility    Assessment:     Lionel Nix is a 59 y.o. male admitted with a medical diagnosis of lumbosacral radiculopathy 2/2 DJD, s/p L2-S1 decompression and L3-S1 TLIF on 8/19.  .  He presents with the following impairments/functional limitations: weakness, impaired endurance, impaired functional mobility, gait instability, impaired balance, orthopedic precautions, pain .    Pt still with major episodes of buckling during amb trial when less pressure applied to BUE on RW. Pt having to bear majority on weight through BUE on RW to offload BLE. If pt applies majority of weight through BLE they are unable to hold him upright and buckle immediately. NSG aware.       Rehab Prognosis: Good; patient would benefit from acute skilled PT services to address these deficits and reach maximum level of function.    Recent Surgery: Procedure(s) (LRB):  FUSION, SPINE, LUMBAR, TLIF, USING COMPUTER-ASSISTED NAVIGATION (Left) 9 Days Post-Op    Plan:     During this hospitalization, patient would benefit from acute PT services 6 x/week to address the identified rehab impairments via gait training, therapeutic activities, therapeutic exercises and progress toward the following goals:    Plan of Care Expires:  09/20/24    Subjective     Chief Complaint:   Patient/Family Comments/goals:   Pain/Comfort:  Location 1: back  Pain Addressed 1: Reposition, Distraction      Objective:     Communicated with NSG prior to session.  Patient found up in chair with TRAVIS drain upon PT entry to room.     General Precautions: Standard, fall  Orthopedic Precautions: spinal precautions  Braces: LSO  Respiratory Status: Room air  Blood Pressure:   Skin Integrity: Visible skin  intact      Functional Mobility:  Transfers:     Sit to Stand:  minimum assistance with rolling walker and 2 trials from bedside chair, 1 trial from toilet. Increased pain   Toilet Transfer: minimum assistance with  rolling walker  using  Step Transfer  Gait: pt amb 10ft 2x and 30ft with RW min-modA. Pt with step-to gait pattern and had 1 episode of buckling 2/2 therapist having him try to take weight off RW with BUE.     Dyn sitting: pt donned LSO prior to mobility. Required assistance adjusting brace. Pt SBA for activity.     Education:  Patient and spouse were provided with verbal education education regarding PT role/goals/POC, post-op precautions, fall prevention, safety awareness, and discharge/DME recommendations.  Understanding was verbalized.     Patient left up in chair with all lines intact, call button in reach, and NSG present    GOALS:   Multidisciplinary Problems       Physical Therapy Goals          Problem: Physical Therapy    Goal Priority Disciplines Outcome Goal Variances Interventions   Physical Therapy Goal     PT, PT/OT Progressing     Description: Goals to be met by: 2024     Patient will increase functional independence with mobility by performin. Supine to sit with Modified Lillie  2. Sit to supine with Modified Lillie  3. Sit to stand transfer with Modified Lillie  4. Bed to chair transfer with Modified Lillie using Least Restrictive Assistive Device  5. Gait  x 200 feet with Modified Lillie using Least Restrictive Assistive Device.                          Time Tracking:     PT Received On: 24  PT Start Time: 826     PT Stop Time: 852  PT Total Time (min): 26 min     Billable Minutes: Gait Training 16 and Therapeutic Activity 10    Treatment Type: Treatment  PT/PTA: PTA     Number of PTA visits since last PT visit: 5     2024

## 2024-08-28 NOTE — H&P
[facility]  CONSULT Hospital Medicine History & Physical Examination       Patient Name: Lionel Nix  MRN: [id number]  Patient Class: IP- Inpatient   Admission Date: 8/19/2024   Admitting Physician:  Service   Length of Stay: 9  Attending Physician: Dr. Car August  Consulting: Dr. Vidhi Rogel  Primary Care Provider: non-staff  Face-to-Face encounter date: 08/28/2024  Code Status: Full code  Chief Complaint: back pain      Screening for Social Drivers for health:  Patient screened for food insecurity, housing instability, transportation needs, utility difficulties, and interpersonal safety (select all that apply as identified as concern)  []Housing or Food  []Transportation Needs  []Utility Difficulties  []Interpersonal safety  [x]None    Patient information was obtained from patient, patient's family, past medical records and/or ER records.     HISTORY OF PRESENT ILLNESS:   Lionel Nix is a 59 y.o. male who  has a past medical history of Anxiety, Chronic right-sided low back pain, Enlarged prostate, Hyperlipidemia, Hypertension, Lumbosacral radiculopathy due to degenerative joint disease of spine, Neuritis or radiculitis due to rupture of lumbar intervertebral disc, Numbness and tingling of both feet, Numbness and tingling of both legs, Obesity, and Other spondylosis with radiculopathy, lumbar region.. The patient presented to [facility] on 8/19/2024 with a primary complaint of  back pain which  he was admitted to Dr. August and had  L2-s1 decompression with L3-S1 fusion on 8/19/2024. Pt working with therapy services. Pt began to have elevation in his blood pressure despite lisinopril and HCTZ. Blood pressure has ranged from -170 and DBP .  Hospital medicine services consulted for HTN.     PAST MEDICAL HISTORY:     Past Medical History:   Diagnosis Date    Anxiety     Chronic right-sided low back pain     Enlarged prostate     Hyperlipidemia     Hypertension      Lumbosacral radiculopathy due to degenerative joint disease of spine     Neuritis or radiculitis due to rupture of lumbar intervertebral disc     Numbness and tingling of both feet     Numbness and tingling of both legs     Obesity     Other spondylosis with radiculopathy, lumbar region        PAST SURGICAL HISTORY:     Past Surgical History:   Procedure Laterality Date    BACK SURGERY      COLONOSCOPY      HERNIA REPAIR      SHOULDER SURGERY Left     TRANSFORAMINAL LUMBAR INTERBODY FUSION (TLIF) USING COMPUTER-ASSISTED NAVIGATION Left 8/19/2024    Procedure: FUSION, SPINE, LUMBAR, TLIF, USING COMPUTER-ASSISTED NAVIGATION;  Surgeon: Car August MD;  Location: [facility];  Service: Neurosurgery;  Laterality: Left;  L2 - S1 TRANSFORAMINAL INTERBODY FUSION / POSTERIOR LATERAL FUSION // PRONE PEDRO // DRILL // MICROSCOPE // O-ARM // DIRECT SPINE // NDM       ALLERGIES:   Gabapentin    FAMILY HISTORY:   Reviewed and negative    SOCIAL HISTORY:     Social History     Tobacco Use    Smoking status: Never    Smokeless tobacco: Never   Substance Use Topics    Alcohol use: Never        HOME MEDICATIONS:   As documented  Prior to Admission medications    Medication Sig Start Date End Date Taking? Authorizing Provider   atorvastatin (LIPITOR) 40 MG tablet Take 1 tablet by mouth every evening. 5/2/24 5/2/25 Yes Provider, Historical   cyclobenzaprine (FLEXERIL) 10 MG tablet Take 10 mg by mouth Daily. 6/4/24  Yes Provider, Historical   diazePAM (VALIUM) 10 MG Tab Take 10 mg by mouth as needed. 5/6/24  Yes Provider, Historical   lisinopriL-hydrochlorothiazide (PRINZIDE,ZESTORETIC) 20-25 mg Tab Take 1 tablet by mouth once daily. 2/20/24  Yes Provider, Historical   metoprolol succinate (TOPROL-XL) 50 MG 24 hr tablet Take 50 mg by mouth once daily.   Yes Provider, Historical   QUEtiapine (SEROQUEL) 300 MG Tab Take 300 mg by mouth nightly.   Yes Provider, Historical   tamsulosin (FLOMAX) 0.4 mg Cap Take 1 capsule by mouth every  "morning. 2/20/24  Yes Provider, Historical   venlafaxine (EFFEXOR-XR) 150 MG Cp24 Take 300 mg by mouth once daily. 12/7/23  Yes Provider, Historical   gabapentin (NEURONTIN) 300 MG capsule TAKE 1 CAPSULE BY MOUTH THREE TIMES DAILY  Patient taking differently: Take 300 mg by mouth 3 (three) times daily. No longer taking 6/9/24   Car August MD   traMADoL (ULTRAM) 50 mg tablet Take 50 mg by mouth as needed. 6/17/24   Provider, Historical       REVIEW OF SYSTEMS:   Except as documented, all other systems reviewed and negative     PHYSICAL EXAM:     VITAL SIGNS: 24 HRS MIN & MAX LAST   Temp  Min: 97.6 °F (36.4 °C)  Max: 98.5 °F (36.9 °C) 98.5 °F (36.9 °C)   BP  Min: 120/70  Max: 181/115 (!) 142/81   Pulse  Min: 88  Max: 133  (!) 133   Resp  Min: 16  Max: 20 18   SpO2  Min: 94 %  Max: 98 % 96 %       General appearance: Well-developed, well-nourished male in no apparent distress.  HENT: Atraumatic head. Moist mucous membranes of oral cavity.  Eyes: PERRL  Lungs: Clear to auscultation bilaterally. No wheezing present.   Heart: Regular rate and rhythm. S1 and S2 present cap refill brisk  Abdomen: Soft, non-distended, non-tender. No rebound tenderness/guarding. Bowel sounds are normal.   Extremities: No cyanosis, clubbing  Skin: warm and dry, surgical site intact  Neuro: oriented x 3 no acute focal deficits  Psych/mental status: Appropriate mood and affect. Responds appropriately to questions.     LABS AND IMAGING:   No results for input(s): "WBC", "RBC", "HGB", "HCT", "MCV", "MCH", "MCHC", "RDW", "PLT", "MPV", "GRAN", "LYMPH", "MONO", "BASO", "NRBC" in the last 168 hours.    No results for input(s): "NA", "K", "CL", "CO2", "ANIONGAP", "BUN", "CREATININE", "GLU", "CALCIUM", "PH", "MG", "ALBUMIN", "PROT", "ALKPHOS", "ALT", "AST", "BILITOT" in the last 168 hours.    Microbiology Results (last 7 days)       ** No results found for the last 168 hours. **             X-Ray Cervical Spine AP And Lateral  Narrative: " EXAMINATION:  XR CERVICAL SPINE AP LATERAL    CPT: 06184    CLINICAL HISTORY:  neck pain;    FINDINGS:  Significantly limited exam with only 4 cervical vertebral bodies clearly seen there is slight anterolisthesis of C3 on C4 and C4 on C5 with degenerative changes of the posterior elements but no other significant diagnostic considerations can be given repeat exam is suggested  Impression: Limited exam with some degenerative changes and anterolisthesis as described above but essentially nondiagnostic other imaging modalities and or repeat exam is suggested    Electronically signed by: Jamal Schmidt  Date:    08/27/2024  Time:    10:37        ASSESSMENT & PLAN:   ASSESSMENT:  HTN urgency-  POA  Anemia- blood loss vs chronic disease- POA  Lumbar radiculopathy due to degenerative joint disease- POA  S/P lumbar decompression L2-S1 and fusion L3-S1- 8/19/2024  Hypokalemia- POA  Weakness- POA    PLAN:  CBC, CMP  in the am  PRN anti-hypertensive medication per parameters  Metoprolol 50 mg po BID  VS q 4 hours  Monitor blood counts  Replete electrolytes  Therapy and Post-operative management per attending neurosurgery services  Home medication as deemed necessary  Will follow along    VTE Prophylaxis:  SCD for DVT prophylaxis and will be advised to be as mobile as possible and sit in a chair as tolerated    Patient condition:  Stable  __________________________________________________________________________  INPATIENT LIST OF MEDICATIONS     Scheduled Meds:   atorvastatin  40 mg Oral QHS    baclofen  10 mg Oral TID    bisacodyL  10 mg Rectal Daily    dexAMETHasone  6 mg Intravenous Q6H    diazePAM  10 mg Oral Q6H    enoxparin  40 mg Subcutaneous Daily    lisinopriL  20 mg Oral Daily    And    hydroCHLOROthiazide  25 mg Oral Daily    OXcarbazepine  150 mg Oral BID    QUEtiapine  300 mg Oral Nightly    senna-docusate 8.6-50 mg  2 tablet Oral BID    tamsulosin  1 capsule Oral QAM    venlafaxine  300 mg Oral Daily      Continuous Infusions:   0.9% NaCl   Intravenous Continuous         PRN Meds:.  Current Facility-Administered Medications:     acetaminophen, 650 mg, Oral, Q6H PRN    acetaminophen, 650 mg, Oral, Q4H PRN    aluminum-magnesium hydroxide-simethicone, 30 mL, Oral, Q4H PRN    calcium carbonate, 500 mg, Oral, Daily PRN    cyclobenzaprine, 10 mg, Oral, TID PRN    hydrALAZINE, 10 mg, Intravenous, Q2H PRN    hydrOXYzine HCL, 25 mg, Oral, TID PRN    ondansetron, 4 mg, Oral, Q6H PRN    oxyCODONE-acetaminophen, 1 tablet, Oral, Q4H PRN    prochlorperazine, 10 mg, Intravenous, Q6H PRN      IAngelina FNP have reviewed and discussed the case with Dr. Vidhi Rogel.  Please see the following addendum for further assessment and plan from their attending MD.  MIGEL Raman   08/28/2024    ________________________________________________________________________________    MD Addendum:  I, Dr. Pebbles olivera --assumed care of this patient today  For the patient encounter, I performed the substantive portion of the visit, I reviewed the NP/PA documentation, treatment plan, and medical decision making.  I had face to face time with this patient     A. History:  59 y.o. male who  has a past medical history of ,Hypertension, Chronic right-sided low back pain ,Lumbosacral radiculopathy due to degenerative joint disease of spine, Neuritis or radiculitis due to rupture of lumbar intervertebral disc, Numbness and tingling of both feet, Numbness and tingling of both legs, Obesity, and Other spondylosis with radiculopathy, lumbar region..     The patient presented to [facility] on 8/19/2024 with a primary complaint of  back pain which  he was admitted to Dr. August and had  L2-s1 decompression with L3-S1 fusion on 8/19/2024. Pt began to have elevation in his blood pressure despite lisinopril and HCTZ. Blood pressure has ranged from -170 and DBP .      Hospital medicine services consulted for blood pressure  control.          ASSESSMENT & PLAN:     HTN urgency-  POA  Anemia- blood loss vs chronic disease- POA  Lumbar radiculopathy due to degenerative joint disease- POA  S/P lumbar decompression L2-S1 and fusion L3-S1- 8/19/2024  Hypokalemia- POA  Weakness- POA     PLAN:    PRN anti-hypertensive medication per parameters  Metoprolol 50 mg po BID  VS q 4 hours  CBC, CMP  in the am  Replete electrolytes  Therapy and Post-operative management per attending neurosurgery services  Home medication as deemed necessary  Will follow along     VTE Prophylaxis:  SCD for DVT prophylaxis and will be advised to be as mobile as possible and sit in a chair as tolerated     Patient condition:  Stable    Discharge Planning and Disposition: No mobility needs. Ambulating well. Good social support system.   Anticipated discharge    All diagnosis and differential diagnosis have been reviewed; assessment and plan has been documented; I have personally reviewed the labs and test results that are presently available; I have reviewed the patients medication list; I have reviewed the consulting providers response and recommendations. I have reviewed or attempted to review medical records based upon their availability.    All of the patient and family questions have been addressed and answered. Patient's is agreeable to the above stated plan. I will continue to monitor closely and make adjustments to medical management as needed.

## 2024-08-28 NOTE — PROGRESS NOTES
Care update:     Unfortunately Mr. Nix is experiencing more of the same back spasms, back pain.  His Dilaudid was discontinued today.  Patient's blood pressure rising due to pain, likely.    Went in and had a conversation with patient and eased him and answered all of his questions.  He states he is not ready to be discharged to rehab today due to pain and blood pressure.  He will hold off and go tomorrow.      Gave him a dose of Toradol as this provided excellent pain relief to him last time he had an acute pain crisis.    Hospitalist to see patient as well.    Spent a great deal of time with him calming him down and reassuring him.  He seems to have a lot of anxiety surrounding and escalating his pain.

## 2024-08-29 LAB
ALBUMIN SERPL-MCNC: 3.2 G/DL (ref 3.4–4.8)
ALBUMIN/GLOB SERPL: 1.1 RATIO (ref 1.1–2)
ALP SERPL-CCNC: 96 UNIT/L (ref 40–150)
ALT SERPL-CCNC: 25 UNIT/L (ref 0–55)
ANION GAP SERPL CALC-SCNC: 9 MEQ/L
AST SERPL-CCNC: 19 UNIT/L (ref 5–34)
BASOPHILS # BLD AUTO: 0.02 X10(3)/MCL
BASOPHILS NFR BLD AUTO: 0.1 %
BILIRUB SERPL-MCNC: 0.6 MG/DL
BUN SERPL-MCNC: 20 MG/DL (ref 8.4–25.7)
CALCIUM SERPL-MCNC: 9.2 MG/DL (ref 8.8–10)
CHLORIDE SERPL-SCNC: 98 MMOL/L (ref 98–107)
CO2 SERPL-SCNC: 28 MMOL/L (ref 23–31)
CREAT SERPL-MCNC: 0.79 MG/DL (ref 0.73–1.18)
CREAT/UREA NIT SERPL: 25
EOSINOPHIL # BLD AUTO: 0 X10(3)/MCL (ref 0–0.9)
EOSINOPHIL NFR BLD AUTO: 0 %
ERYTHROCYTE [DISTWIDTH] IN BLOOD BY AUTOMATED COUNT: 12.5 % (ref 11.5–17)
GFR SERPLBLD CREATININE-BSD FMLA CKD-EPI: >60 ML/MIN/1.73/M2
GLOBULIN SER-MCNC: 3 GM/DL (ref 2.4–3.5)
GLUCOSE SERPL-MCNC: 158 MG/DL (ref 82–115)
HCT VFR BLD AUTO: 31.3 % (ref 42–52)
HGB BLD-MCNC: 10.7 G/DL (ref 14–18)
IMM GRANULOCYTES # BLD AUTO: 0.53 X10(3)/MCL (ref 0–0.04)
IMM GRANULOCYTES NFR BLD AUTO: 3.7 %
LYMPHOCYTES # BLD AUTO: 1.04 X10(3)/MCL (ref 0.6–4.6)
LYMPHOCYTES NFR BLD AUTO: 7.3 %
MCH RBC QN AUTO: 31 PG (ref 27–31)
MCHC RBC AUTO-ENTMCNC: 34.2 G/DL (ref 33–36)
MCV RBC AUTO: 90.7 FL (ref 80–94)
MONOCYTES # BLD AUTO: 0.65 X10(3)/MCL (ref 0.1–1.3)
MONOCYTES NFR BLD AUTO: 4.6 %
NEUTROPHILS # BLD AUTO: 11.93 X10(3)/MCL (ref 2.1–9.2)
NEUTROPHILS NFR BLD AUTO: 84.3 %
NRBC BLD AUTO-RTO: 0.2 %
PLATELET # BLD AUTO: 287 X10(3)/MCL (ref 130–400)
PMV BLD AUTO: 9.1 FL (ref 7.4–10.4)
POTASSIUM SERPL-SCNC: 4.4 MMOL/L (ref 3.5–5.1)
PROT SERPL-MCNC: 6.2 GM/DL (ref 5.8–7.6)
RBC # BLD AUTO: 3.45 X10(6)/MCL (ref 4.7–6.1)
SODIUM SERPL-SCNC: 135 MMOL/L (ref 136–145)
WBC # BLD AUTO: 14.17 X10(3)/MCL (ref 4.5–11.5)

## 2024-08-29 PROCEDURE — 97116 GAIT TRAINING THERAPY: CPT

## 2024-08-29 PROCEDURE — 63600175 PHARM REV CODE 636 W HCPCS

## 2024-08-29 PROCEDURE — 63600175 PHARM REV CODE 636 W HCPCS: Performed by: PHYSICIAN ASSISTANT

## 2024-08-29 PROCEDURE — 25000003 PHARM REV CODE 250: Performed by: INTERNAL MEDICINE

## 2024-08-29 PROCEDURE — 11000001 HC ACUTE MED/SURG PRIVATE ROOM

## 2024-08-29 PROCEDURE — 97168 OT RE-EVAL EST PLAN CARE: CPT

## 2024-08-29 PROCEDURE — 97535 SELF CARE MNGMENT TRAINING: CPT

## 2024-08-29 PROCEDURE — 97164 PT RE-EVAL EST PLAN CARE: CPT

## 2024-08-29 PROCEDURE — 80053 COMPREHEN METABOLIC PANEL: CPT | Performed by: NURSE PRACTITIONER

## 2024-08-29 PROCEDURE — 63600175 PHARM REV CODE 636 W HCPCS: Performed by: NURSE PRACTITIONER

## 2024-08-29 PROCEDURE — 99900035 HC TECH TIME PER 15 MIN (STAT)

## 2024-08-29 PROCEDURE — 25000003 PHARM REV CODE 250

## 2024-08-29 PROCEDURE — 25000003 PHARM REV CODE 250: Performed by: NURSE PRACTITIONER

## 2024-08-29 PROCEDURE — 36415 COLL VENOUS BLD VENIPUNCTURE: CPT | Performed by: NURSE PRACTITIONER

## 2024-08-29 PROCEDURE — 94760 N-INVAS EAR/PLS OXIMETRY 1: CPT

## 2024-08-29 PROCEDURE — 99900031 HC PATIENT EDUCATION (STAT)

## 2024-08-29 PROCEDURE — 85025 COMPLETE CBC W/AUTO DIFF WBC: CPT | Performed by: NURSE PRACTITIONER

## 2024-08-29 PROCEDURE — 25000003 PHARM REV CODE 250: Performed by: NEUROLOGICAL SURGERY

## 2024-08-29 RX ORDER — KETOROLAC TROMETHAMINE 30 MG/ML
30 INJECTION, SOLUTION INTRAMUSCULAR; INTRAVENOUS ONCE
Status: COMPLETED | OUTPATIENT
Start: 2024-08-29 | End: 2024-08-29

## 2024-08-29 RX ORDER — DEXAMETHASONE SODIUM PHOSPHATE 4 MG/ML
4 INJECTION, SOLUTION INTRA-ARTICULAR; INTRALESIONAL; INTRAMUSCULAR; INTRAVENOUS; SOFT TISSUE EVERY 6 HOURS
Status: DISCONTINUED | OUTPATIENT
Start: 2024-08-29 | End: 2024-08-30

## 2024-08-29 RX ORDER — CARVEDILOL 12.5 MG/1
12.5 TABLET ORAL 2 TIMES DAILY WITH MEALS
Status: DISCONTINUED | OUTPATIENT
Start: 2024-08-29 | End: 2024-09-04 | Stop reason: HOSPADM

## 2024-08-29 RX ORDER — LISINOPRIL 20 MG/1
20 TABLET ORAL ONCE
Status: COMPLETED | OUTPATIENT
Start: 2024-08-29 | End: 2024-08-29

## 2024-08-29 RX ORDER — HYDROCHLOROTHIAZIDE 25 MG/1
25 TABLET ORAL DAILY
Status: DISCONTINUED | OUTPATIENT
Start: 2024-08-30 | End: 2024-09-04 | Stop reason: HOSPADM

## 2024-08-29 RX ORDER — LISINOPRIL 20 MG/1
40 TABLET ORAL DAILY
Status: DISCONTINUED | OUTPATIENT
Start: 2024-08-30 | End: 2024-09-04 | Stop reason: HOSPADM

## 2024-08-29 RX ADMIN — LISINOPRIL 20 MG: 20 TABLET ORAL at 12:08

## 2024-08-29 RX ADMIN — OXYCODONE AND ACETAMINOPHEN 1 TABLET: 10; 325 TABLET ORAL at 08:08

## 2024-08-29 RX ADMIN — KETOROLAC TROMETHAMINE 30 MG: 30 INJECTION, SOLUTION INTRAMUSCULAR at 06:08

## 2024-08-29 RX ADMIN — HYDROCHLOROTHIAZIDE 25 MG: 25 TABLET ORAL at 08:08

## 2024-08-29 RX ADMIN — CYCLOBENZAPRINE 10 MG: 10 TABLET, FILM COATED ORAL at 08:08

## 2024-08-29 RX ADMIN — LISINOPRIL 20 MG: 20 TABLET ORAL at 08:08

## 2024-08-29 RX ADMIN — OXCARBAZEPINE 150 MG: 150 TABLET, FILM COATED ORAL at 08:08

## 2024-08-29 RX ADMIN — OXYCODONE AND ACETAMINOPHEN 1 TABLET: 10; 325 TABLET ORAL at 12:08

## 2024-08-29 RX ADMIN — BACLOFEN 10 MG: 10 TABLET ORAL at 03:08

## 2024-08-29 RX ADMIN — DEXAMETHASONE SODIUM PHOSPHATE 4 MG: 4 INJECTION, SOLUTION INTRA-ARTICULAR; INTRALESIONAL; INTRAMUSCULAR; INTRAVENOUS; SOFT TISSUE at 12:08

## 2024-08-29 RX ADMIN — SENNOSIDES AND DOCUSATE SODIUM 2 TABLET: 50; 8.6 TABLET ORAL at 08:08

## 2024-08-29 RX ADMIN — DIAZEPAM 10 MG: 5 TABLET ORAL at 05:08

## 2024-08-29 RX ADMIN — DIAZEPAM 10 MG: 5 TABLET ORAL at 12:08

## 2024-08-29 RX ADMIN — BISACODYL 10 MG: 10 SUPPOSITORY RECTAL at 08:08

## 2024-08-29 RX ADMIN — BACLOFEN 10 MG: 10 TABLET ORAL at 08:08

## 2024-08-29 RX ADMIN — TAMSULOSIN HYDROCHLORIDE 0.4 MG: 0.4 CAPSULE ORAL at 06:08

## 2024-08-29 RX ADMIN — DEXAMETHASONE SODIUM PHOSPHATE 6 MG: 4 INJECTION, SOLUTION INTRA-ARTICULAR; INTRALESIONAL; INTRAMUSCULAR; INTRAVENOUS; SOFT TISSUE at 05:08

## 2024-08-29 RX ADMIN — HYDRALAZINE HYDROCHLORIDE 10 MG: 20 INJECTION INTRAMUSCULAR; INTRAVENOUS at 07:08

## 2024-08-29 RX ADMIN — METOPROLOL TARTRATE 50 MG: 50 TABLET, FILM COATED ORAL at 08:08

## 2024-08-29 RX ADMIN — DEXAMETHASONE SODIUM PHOSPHATE 6 MG: 4 INJECTION, SOLUTION INTRA-ARTICULAR; INTRALESIONAL; INTRAMUSCULAR; INTRAVENOUS; SOFT TISSUE at 12:08

## 2024-08-29 RX ADMIN — DEXAMETHASONE SODIUM PHOSPHATE 4 MG: 4 INJECTION, SOLUTION INTRA-ARTICULAR; INTRALESIONAL; INTRAMUSCULAR; INTRAVENOUS; SOFT TISSUE at 05:08

## 2024-08-29 RX ADMIN — VENLAFAXINE HYDROCHLORIDE 300 MG: 150 CAPSULE, EXTENDED RELEASE ORAL at 07:08

## 2024-08-29 RX ADMIN — QUETIAPINE FUMARATE 300 MG: 300 TABLET ORAL at 08:08

## 2024-08-29 RX ADMIN — CARVEDILOL 12.5 MG: 12.5 TABLET, FILM COATED ORAL at 05:08

## 2024-08-29 RX ADMIN — OXYCODONE AND ACETAMINOPHEN 1 TABLET: 10; 325 TABLET ORAL at 04:08

## 2024-08-29 RX ADMIN — OXYCODONE AND ACETAMINOPHEN 1 TABLET: 10; 325 TABLET ORAL at 03:08

## 2024-08-29 RX ADMIN — ATORVASTATIN CALCIUM 40 MG: 40 TABLET, FILM COATED ORAL at 08:08

## 2024-08-29 RX ADMIN — ENOXAPARIN SODIUM 40 MG: 40 INJECTION SUBCUTANEOUS at 05:08

## 2024-08-29 RX ADMIN — PREGABALIN 75 MG: 50 CAPSULE ORAL at 08:08

## 2024-08-29 RX ADMIN — METOPROLOL TARTRATE 50 MG: 50 TABLET, FILM COATED ORAL at 12:08

## 2024-08-29 NOTE — NURSING
Nurses Note -- 4 Eyes      8/28/2024   8:28 PM      Skin assessed during: Q Shift Change      [] No Altered Skin Integrity Present    []Prevention Measures Documented      [x] Yes- Altered Skin Integrity Present or Discovered   [] LDA Added if Not in Epic (Describe Wound)   [] New Altered Skin Integrity was Present on Admit and Documented in LDA   [x] Wound Image Taken    Wound Care Consulted? No    Attending Nurse:  MELODIE Galvez    Second RN/Staff Member:   MELODIE Santos          Cibinqo Pregnancy And Lactation Text: It is unknown if this medication will adversely affect pregnancy or breast feeding.  You should not take this medication if you are currently pregnant or planning a pregnancy or while breastfeeding.

## 2024-08-29 NOTE — PLAN OF CARE
Problem: Occupational Therapy  Goal: Occupational Therapy Goal  Description: Goals to be met by: 9/17/24     Patient will increase functional independence with ADLs by performing:    UE Dressing with Modified [place].  LE Dressing with Modified [place].with AE prn  Grooming while standing with Modified [place].  Toileting from toilet with Modified [place] for hygiene and clothing management.   Toilet transfer to toilet with Modified [place].    Outcome: Progressing

## 2024-08-29 NOTE — PLAN OF CARE
F/u with Tere at  Ortho requesting update on possible admit today. Stated pt's BP is still elevated and so is WBC. She will discuss this with her MD to determine if pt is appropriate to admit. Pending further update.     Christel Tubbs LCSW

## 2024-08-29 NOTE — PT/OT/SLP RE-EVAL
Physical Therapy Re-Evaluation    Patient Name:  Lionel Nix   MRN:  [id number]    Recommendations:     Discharge therapy intensity: High Intensity Therapy   Discharge Equipment Recommendations: to be determined by next level of care   Barriers to discharge: Impaired mobility    Assessment:     Lionel Nix is a 59 y.o. male admitted with a medical diagnosis of lumbosacral radiculopathy 2/2 DJD, s/p L2-S1 decompression and L3-S1 TLIF on 8/19.  He presents with the following impairments/functional limitations: weakness, impaired endurance, impaired self care skills, impaired functional mobility, gait instability, impaired balance, pain, orthopedic precautions, decreased lower extremity function. Pt tolerated session well. BP WNL at both beginning and end of session (see BP section below). Pt is Leif for sit to stand and ambulated 75ft Min-ModA with a RW. Did not assess bed mobility this date due to pt already being OOB, but pt reports he still requires a significant amount of help to get in and out of bed. Pt's strength WNL upon testing, however he still has some occasions with knee buckling when walking and depends heavily on his UEs when walking with his RW to avoid this. Continuing to recommend HIGH itnensity PT upon d/c.    Rehab Prognosis: Good; patient would benefit from acute skilled PT services to address these deficits and reach maximum level of function.    Recent Surgery: Procedure(s) (LRB):  FUSION, SPINE, LUMBAR, TLIF, USING COMPUTER-ASSISTED NAVIGATION (Left) 10 Days Post-Op    Plan:     During this hospitalization, patient would benefit from acute PT services 6 x/week to address the identified rehab impairments via gait training, therapeutic exercises, therapeutic activities and progress toward the following goals:    Plan of Care Expires:  09/27/24    PT/PTA conference to discuss PT POC and patient's progression towards goals held with Angela Mora PTA.     Subjective     Chief Complaint:  "pain  Patient/Family Comments/goals: return to PLOF  Pain/Comfort:  Pain Rating 1: other (see comments) (9.5/10)  Location 1: back  Pain Addressed 1: Reposition, Distraction    Patients cultural, spiritual, [employer] conflicts given the current situation: no    Objective:     Communicated with NSG prior to session.  Patient found up in chair with TRAVIS drain  upon PT entry to room.    General Precautions: Standard, fall  Orthopedic Precautions:spinal precautions   Braces: LSO  Respiratory Status: Room air  Blood Pressure: 127/81 at beginning of session; 128/82 at conclusion of session after walking 75ft      Exams:  RLE ROM: WFL  RLE Strength: 4+/5 grossly  LLE ROM: WFL  LLE Strength: 4+/5 grossly  Skin integrity: Visible skin intact      Functional Mobility:  Transfers:     Sit to Stand:  minimum assistance with rolling walker  Gait: Pt ambulated 75ft with Min-ModA and RW, using step to gait pattern and bearing "probably about 80%" of his weight through his BUEs. Pt had one instance of buckling when he attempted to bear more weight through his BLEs.       AM-PAC 6 CLICK MOBILITY  Total Score:        Treatment & Education:    Patient and family were provided with verbal education education regarding PT role/goals/POC, fall prevention, and discharge/DME recommendations.  Understanding was verbalized.     Patient left up in chair with call button in reach.    GOALS:   Multidisciplinary Problems       Physical Therapy Goals          Problem: Physical Therapy    Goal Priority Disciplines Outcome Goal Variances Interventions   Physical Therapy Goal     PT, PT/OT Progressing     Description: Goals to be met by: 2024     Patient will increase functional independence with mobility by performin. Supine to sit with Modified [place]  2. Sit to supine with Modified [place]  3. Sit to stand transfer with Modified [place]  4. Bed to chair transfer with Modified [place] using Least Restrictive " Assistive Device  5. Gait  x 200 feet with Modified [place] using Least Restrictive Assistive Device.                          History:     Past Medical History:   Diagnosis Date    Anxiety     Chronic right-sided low back pain     Enlarged prostate     Hyperlipidemia     Hypertension     Lumbosacral radiculopathy due to degenerative joint disease of spine     Neuritis or radiculitis due to rupture of lumbar intervertebral disc     Numbness and tingling of both feet     Numbness and tingling of both legs     Obesity     Other spondylosis with radiculopathy, lumbar region        Past Surgical History:   Procedure Laterality Date    BACK SURGERY      COLONOSCOPY      HERNIA REPAIR      SHOULDER SURGERY Left     TRANSFORAMINAL LUMBAR INTERBODY FUSION (TLIF) USING COMPUTER-ASSISTED NAVIGATION Left 8/19/2024    Procedure: FUSION, SPINE, LUMBAR, TLIF, USING COMPUTER-ASSISTED NAVIGATION;  Surgeon: Car August MD;  Location: [facility];  Service: Neurosurgery;  Laterality: Left;  L2 - S1 TRANSFORAMINAL INTERBODY FUSION / POSTERIOR LATERAL FUSION // PRONE PEDRO // DRILL // MICROSCOPE // O-ARM // DIRECT SPINE // NDM       Time Tracking:     PT Received On: 08/29/24  PT Start Time: 1317     PT Stop Time: 1352  PT Total Time (min): 35 min     Billable Minutes: Re-eval 20 and Gait Training 15      08/29/2024

## 2024-08-29 NOTE — NURSING
Nurses Note -- 4 Eyes      8/29/2024   1:01 PM      Skin assessed during: Daily Assessment      [] No Altered Skin Integrity Present    []Prevention Measures Documented      [x] Yes- Altered Skin Integrity Present or Discovered   [] LDA Added if Not in Epic (Describe Wound)   [] New Altered Skin Integrity was Present on Admit and Documented in LDA   [x] Wound Image Taken    Wound Care Consulted? No-surgical incision    Attending Nurse:  Danielle Santoro RN/Staff Member:   caroline

## 2024-08-29 NOTE — PT/OT/SLP RE-EVAL
Occupational Therapy   Re-evaluation    Name: Lionel Nix  MRN: [id number]  Admitting Diagnosis: L spine radiculitis  Recent Surgery: Procedure(s) (LRB):  FUSION, SPINE, LUMBAR, TLIF, USING COMPUTER-ASSISTED NAVIGATION (Left) 10 Days Post-Op    Recommendations:     Discharge therapy intensity: High Intensity Therapy   Discharge Equipment Recommendations:  none  Barriers to discharge:  None    Assessment:     Lionel Nix is a 59 y.o. male with a medical diagnosis of L spine radiculitis s/p TLIF.  He presents with good effort, is highly motivated, and shows continued improvement with ADL's but still with significant pain in hips and BLE's and still with buckling BLE's with any decrease in UE support on walker or other surfaces. He also presents with the following performance deficits affecting function: weakness, impaired endurance, impaired self care skills, impaired functional mobility.    Rehab Prognosis: Good; patient would benefit from acute skilled OT services to address these deficits and reach maximum level of function.       Plan:     Patient to be seen 5 x/week to address the above listed problems via self-care/home management, therapeutic activities, therapeutic exercises  Plan of Care Expires: 09/26/24  Plan of Care Reviewed with: patient    Subjective     Chief Complaint: pain in hips/back and into BLE's  Patient/Family Comments/goals: return to PLOF    Pain/Comfort:  Pain Rating 1:  (9.5/10, back/legs)    Patients cultural, spiritual, Restorationism conflicts given the current situation: no    Objective:     OT communicated with nsg and pT prior to session.      Patient was found up in chair with SCD, peripheral IV, TRAVIS drain upon OT entry to room.    General Precautions: Standard, fall  Orthopedic Precautions: spinal precautions  Braces: LSO    Vital Signs: BP: sitting prior to ambulation: 127/81, ambulated 75ft and BP after activity: 128/82    Bed Mobility:        Functional Mobility/Transfers:  Sit to  stand from chair with SBA  Functional Mobility: ambulated > household distance with RW with min assist, with any decreased pressure from Ue's on walker pt requires mod/max assist 2/2 buckling    Activities of Daily Living:  Donning LB dress with min assist with AE, mod assist without aD with wifes assist, able to use sock aid mod I  Able to marisol LSO mod I    AMPAC 6 Click ADL:  AMPA Total Score:      Functional Cognition:  intact    Visual Perceptual Skills:  intact    Upper Extremity Function:  Right Upper Extremity:   wfl    Left Upper Extremity:  wfl    Balance:   Good sitting  CGA with UE support on walker  Mod/max assist without UE support      Additional Treatment:  ADL Training: as above; educated on POC, precautions with activity, safety with activity, progression of activity    Patient Education:  Patient  provided with verbal education and demonstrations education regarding post op precautions, fall prevention, safety awareness, and Discharge/DME recommendations.  Understanding was verbalized.     Patient left up in chair with call button in reach and wife present    GOALS:   Multidisciplinary Problems       Occupational Therapy Goals          Problem: Occupational Therapy    Goal Priority Disciplines Outcome Interventions   Occupational Therapy Goal     OT, PT/OT Progressing    Description: Goals to be met by: 9/17/24     Patient will increase functional independence with ADLs by performing:    UE Dressing with Modified [place].  LE Dressing with Modified [place].with AE prn  Grooming while standing with Modified [place].  Toileting from toilet with Modified [place] for hygiene and clothing management.   Toilet transfer to toilet with Modified [place].                         History:     Past Medical History:   Diagnosis Date    Anxiety     Chronic right-sided low back pain     Enlarged prostate     Hyperlipidemia     Hypertension     Lumbosacral radiculopathy due to  degenerative joint disease of spine     Neuritis or radiculitis due to rupture of lumbar intervertebral disc     Numbness and tingling of both feet     Numbness and tingling of both legs     Obesity     Other spondylosis with radiculopathy, lumbar region          Past Surgical History:   Procedure Laterality Date    BACK SURGERY      COLONOSCOPY      HERNIA REPAIR      SHOULDER SURGERY Left     TRANSFORAMINAL LUMBAR INTERBODY FUSION (TLIF) USING COMPUTER-ASSISTED NAVIGATION Left 8/19/2024    Procedure: FUSION, SPINE, LUMBAR, TLIF, USING COMPUTER-ASSISTED NAVIGATION;  Surgeon: Car August MD;  Location: [facility];  Service: Neurosurgery;  Laterality: Left;  L2 - S1 TRANSFORAMINAL INTERBODY FUSION / POSTERIOR LATERAL FUSION // PRONE PEDRO // DRILL // MICROSCOPE // O-ARM // DIRECT SPINE // NDM       Time Tracking:     OT Date of Treatment: 08/29/24  OT Start Time: 1316  OT Stop Time: 1354  OT Total Time (min): 38 min    Billable Minutes:Re-eval 15 min  Self Care/Home Management 23 min    8/29/2024

## 2024-08-29 NOTE — PROGRESS NOTES
"Hospital Medicine  Progress Note    Patient Name: Lionel Nix  MRN: [id number]  Status: IP- Inpatient   Admission Date: 8/19/2024  Length of Stay: 10  Date of Service: 08/29/2024       CC:  Consultation follow-up for HTN management       SUBJECTIVE   "59 y.o. male who  has a past medical history of Anxiety, Chronic right-sided low back pain, Enlarged prostate, Hyperlipidemia, Hypertension, Lumbosacral radiculopathy due to degenerative joint disease of spine, Neuritis or radiculitis due to rupture of lumbar intervertebral disc, Numbness and tingling of both feet, Numbness and tingling of both legs, Obesity, and Other spondylosis with radiculopathy, lumbar region.. The patient presented to [facility] on 8/19/2024 with a primary complaint of  back pain which  he was admitted to Dr. August and had  L2-s1 decompression with L3-S1 fusion on 8/19/2024. Pt working with therapy services. Pt began to have elevation in his blood pressure despite lisinopril and HCTZ. Blood pressure has ranged from -170 and DBP .  Hospital medicine services consulted for HTN."    Today: Patient seen and examined at bedside, and chart reviewed.  Pressures remains markedly accelerated, as high as 171/119.  He is also complaining of a lot of pain, reports low back.  Also reports posterior neck pain/headache (though his could be secondary to accelerated HTN).        MEDICATIONS   Scheduled   atorvastatin  40 mg Oral QHS    baclofen  10 mg Oral TID    bisacodyL  10 mg Rectal Daily    carvediloL  12.5 mg Oral BID WM    dexAMETHasone  4 mg Intravenous Q6H    diazePAM  10 mg Oral Q6H    enoxparin  40 mg Subcutaneous Daily    [START ON 8/30/2024] lisinopriL  40 mg Oral Daily    And    [START ON 8/30/2024] hydroCHLOROthiazide  25 mg Oral Daily    lisinopriL  20 mg Oral Once    OXcarbazepine  150 mg Oral BID    pregabalin  75 mg Oral QHS    QUEtiapine  300 mg Oral Nightly    senna-docusate 8.6-50 mg  2 tablet Oral BID    tamsulosin  1 capsule Oral " QAM    venlafaxine  300 mg Oral Daily     Continuous Infusions   0.9% NaCl   Intravenous Continuous             PHYSICAL EXAM   VITALS: T 98.2 °F (36.8 °C)   BP (!) 150/79   P 73   RR 18   O2 96 %    GENERAL: Awake and in NAD  LUNGS: CTA anteriorly  CVS: RRR  GI/: Soft, NT, bowel sounds positive.  EXTREMITIES: No significant LE edema  NEURO: AAOx3  PSYCH: Cooperative      LABS   CBC  Recent Labs     08/29/24  0452   WBC 14.17*   RBC 3.45*   HGB 10.7*   HCT 31.3*   MCV 90.7   MCH 31.0   MCHC 34.2   RDW 12.5        CHEM  Recent Labs     08/29/24  0452   *   K 4.4   CO2 28   BUN 20.0   CREATININE 0.79   GLUCOSE 158*   CALCIUM 9.2   ALBUMIN 3.2*   GLOBULIN 3.0   ALKPHOS 96   ALT 25   AST 19   BILITOT 0.6       DIAGNOSTICS   X-Ray Cervical Spine AP And Lateral  Narrative: EXAMINATION:  XR CERVICAL SPINE AP LATERAL    CPT: 90935    CLINICAL HISTORY:  neck pain;    FINDINGS:  Significantly limited exam with only 4 cervical vertebral bodies clearly seen there is slight anterolisthesis of C3 on C4 and C4 on C5 with degenerative changes of the posterior elements but no other significant diagnostic considerations can be given repeat exam is suggested  Impression: Limited exam with some degenerative changes and anterolisthesis as described above but essentially nondiagnostic other imaging modalities and or repeat exam is suggested    Electronically signed by: Jamal Schmidt  Date:    08/27/2024  Time:    10:37        ASSESSMENT   HTN urgency  Normocytic Anemia, suspect blood loss related to surgery  Lumbar radiculopathy s/p lumbar decompression L2-S1 and fusion L3-S1 8/19  Leukocytosis    PLAN   Pain likely contributing significantly for accelerated pressures, would recommend better pain control  Will add Lyrica for now, defer further to neurosurgery  Will also increase to lisinopril to 40, and change metoprolol to carvedilol; continue current HCTZ  Will continue to follow along and adjust  accordingly.  Leukocytosis possibly reactive, patient afebrile, would trend, repeat labs in AM.  Would hold off on sepsis workup pending further increasing white count or fever.            Aguila Whyte MD  [facility]

## 2024-08-30 LAB
ANION GAP SERPL CALC-SCNC: 10 MEQ/L
BACTERIA #/AREA URNS AUTO: NORMAL /HPF
BASOPHILS # BLD AUTO: 0.01 X10(3)/MCL
BASOPHILS NFR BLD AUTO: 0.1 %
BILIRUB UR QL STRIP.AUTO: NEGATIVE
BUN SERPL-MCNC: 24.2 MG/DL (ref 8.4–25.7)
CALCIUM SERPL-MCNC: 8.6 MG/DL (ref 8.8–10)
CHLORIDE SERPL-SCNC: 100 MMOL/L (ref 98–107)
CLARITY UR: CLEAR
CO2 SERPL-SCNC: 24 MMOL/L (ref 23–31)
COLOR UR AUTO: NORMAL
CREAT SERPL-MCNC: 0.79 MG/DL (ref 0.73–1.18)
CREAT/UREA NIT SERPL: 31
D DIMER PPP IA.FEU-MCNC: 0.55 UG/ML FEU (ref 0–0.5)
EOSINOPHIL # BLD AUTO: 0.01 X10(3)/MCL (ref 0–0.9)
EOSINOPHIL NFR BLD AUTO: 0.1 %
ERYTHROCYTE [DISTWIDTH] IN BLOOD BY AUTOMATED COUNT: 12.4 % (ref 11.5–17)
GFR SERPLBLD CREATININE-BSD FMLA CKD-EPI: >60 ML/MIN/1.73/M2
GLUCOSE SERPL-MCNC: 134 MG/DL (ref 82–115)
GLUCOSE UR QL STRIP: NORMAL
HCT VFR BLD AUTO: 31.2 % (ref 42–52)
HGB BLD-MCNC: 10.6 G/DL (ref 14–18)
HGB UR QL STRIP: NEGATIVE
IMM GRANULOCYTES # BLD AUTO: 0.36 X10(3)/MCL (ref 0–0.04)
IMM GRANULOCYTES NFR BLD AUTO: 2.6 %
KETONES UR QL STRIP: NEGATIVE
LEUKOCYTE ESTERASE UR QL STRIP: NEGATIVE
LYMPHOCYTES # BLD AUTO: 1.99 X10(3)/MCL (ref 0.6–4.6)
LYMPHOCYTES NFR BLD AUTO: 14.2 %
MCH RBC QN AUTO: 30.9 PG (ref 27–31)
MCHC RBC AUTO-ENTMCNC: 34 G/DL (ref 33–36)
MCV RBC AUTO: 91 FL (ref 80–94)
MONOCYTES # BLD AUTO: 0.84 X10(3)/MCL (ref 0.1–1.3)
MONOCYTES NFR BLD AUTO: 6 %
NEUTROPHILS # BLD AUTO: 10.85 X10(3)/MCL (ref 2.1–9.2)
NEUTROPHILS NFR BLD AUTO: 77 %
NITRITE UR QL STRIP: NEGATIVE
NRBC BLD AUTO-RTO: 0.2 %
PH UR STRIP: 6.5 [PH]
PLATELET # BLD AUTO: 298 X10(3)/MCL (ref 130–400)
PMV BLD AUTO: 8.8 FL (ref 7.4–10.4)
POTASSIUM SERPL-SCNC: 3.8 MMOL/L (ref 3.5–5.1)
PROT UR QL STRIP: NEGATIVE
RBC # BLD AUTO: 3.43 X10(6)/MCL (ref 4.7–6.1)
RBC #/AREA URNS AUTO: NORMAL /HPF
SODIUM SERPL-SCNC: 134 MMOL/L (ref 136–145)
SP GR UR STRIP.AUTO: 1.01 (ref 1–1.03)
SQUAMOUS #/AREA URNS LPF: NORMAL /HPF
UROBILINOGEN UR STRIP-ACNC: NORMAL
WBC # BLD AUTO: 14.06 X10(3)/MCL (ref 4.5–11.5)
WBC #/AREA URNS AUTO: NORMAL /HPF

## 2024-08-30 PROCEDURE — 80048 BASIC METABOLIC PNL TOTAL CA: CPT | Performed by: INTERNAL MEDICINE

## 2024-08-30 PROCEDURE — 36415 COLL VENOUS BLD VENIPUNCTURE: CPT

## 2024-08-30 PROCEDURE — 97535 SELF CARE MNGMENT TRAINING: CPT

## 2024-08-30 PROCEDURE — 97116 GAIT TRAINING THERAPY: CPT | Mod: CQ

## 2024-08-30 PROCEDURE — 63600175 PHARM REV CODE 636 W HCPCS

## 2024-08-30 PROCEDURE — 25000003 PHARM REV CODE 250: Performed by: INTERNAL MEDICINE

## 2024-08-30 PROCEDURE — 85379 FIBRIN DEGRADATION QUANT: CPT

## 2024-08-30 PROCEDURE — 97110 THERAPEUTIC EXERCISES: CPT | Mod: CQ

## 2024-08-30 PROCEDURE — 25000003 PHARM REV CODE 250: Performed by: NEUROLOGICAL SURGERY

## 2024-08-30 PROCEDURE — 81001 URINALYSIS AUTO W/SCOPE: CPT

## 2024-08-30 PROCEDURE — 99900031 HC PATIENT EDUCATION (STAT)

## 2024-08-30 PROCEDURE — 99900035 HC TECH TIME PER 15 MIN (STAT)

## 2024-08-30 PROCEDURE — 84145 PROCALCITONIN (PCT): CPT

## 2024-08-30 PROCEDURE — 94799 UNLISTED PULMONARY SVC/PX: CPT

## 2024-08-30 PROCEDURE — 11000001 HC ACUTE MED/SURG PRIVATE ROOM

## 2024-08-30 PROCEDURE — 85025 COMPLETE CBC W/AUTO DIFF WBC: CPT | Performed by: INTERNAL MEDICINE

## 2024-08-30 PROCEDURE — 97110 THERAPEUTIC EXERCISES: CPT

## 2024-08-30 PROCEDURE — 25000003 PHARM REV CODE 250

## 2024-08-30 PROCEDURE — 36415 COLL VENOUS BLD VENIPUNCTURE: CPT | Performed by: INTERNAL MEDICINE

## 2024-08-30 PROCEDURE — 94760 N-INVAS EAR/PLS OXIMETRY 1: CPT

## 2024-08-30 RX ORDER — DEXAMETHASONE SODIUM PHOSPHATE 4 MG/ML
2 INJECTION, SOLUTION INTRA-ARTICULAR; INTRALESIONAL; INTRAMUSCULAR; INTRAVENOUS; SOFT TISSUE EVERY 6 HOURS
Status: DISCONTINUED | OUTPATIENT
Start: 2024-08-30 | End: 2024-09-02

## 2024-08-30 RX ADMIN — LISINOPRIL 40 MG: 20 TABLET ORAL at 08:08

## 2024-08-30 RX ADMIN — BACLOFEN 10 MG: 10 TABLET ORAL at 08:08

## 2024-08-30 RX ADMIN — DIAZEPAM 10 MG: 5 TABLET ORAL at 06:08

## 2024-08-30 RX ADMIN — CARVEDILOL 12.5 MG: 12.5 TABLET, FILM COATED ORAL at 08:08

## 2024-08-30 RX ADMIN — QUETIAPINE FUMARATE 300 MG: 300 TABLET ORAL at 09:08

## 2024-08-30 RX ADMIN — ATORVASTATIN CALCIUM 40 MG: 40 TABLET, FILM COATED ORAL at 09:08

## 2024-08-30 RX ADMIN — HYDROXYZINE HYDROCHLORIDE 25 MG: 25 TABLET, FILM COATED ORAL at 09:08

## 2024-08-30 RX ADMIN — DIAZEPAM 10 MG: 5 TABLET ORAL at 12:08

## 2024-08-30 RX ADMIN — ENOXAPARIN SODIUM 40 MG: 40 INJECTION SUBCUTANEOUS at 05:08

## 2024-08-30 RX ADMIN — DEXAMETHASONE SODIUM PHOSPHATE 2 MG: 4 INJECTION, SOLUTION INTRA-ARTICULAR; INTRALESIONAL; INTRAMUSCULAR; INTRAVENOUS; SOFT TISSUE at 05:08

## 2024-08-30 RX ADMIN — DEXAMETHASONE SODIUM PHOSPHATE 2 MG: 4 INJECTION, SOLUTION INTRA-ARTICULAR; INTRALESIONAL; INTRAMUSCULAR; INTRAVENOUS; SOFT TISSUE at 11:08

## 2024-08-30 RX ADMIN — DIAZEPAM 10 MG: 5 TABLET ORAL at 05:08

## 2024-08-30 RX ADMIN — OXYCODONE AND ACETAMINOPHEN 1 TABLET: 10; 325 TABLET ORAL at 01:08

## 2024-08-30 RX ADMIN — DEXAMETHASONE SODIUM PHOSPHATE 4 MG: 4 INJECTION, SOLUTION INTRA-ARTICULAR; INTRALESIONAL; INTRAMUSCULAR; INTRAVENOUS; SOFT TISSUE at 06:08

## 2024-08-30 RX ADMIN — OXYCODONE AND ACETAMINOPHEN 1 TABLET: 10; 325 TABLET ORAL at 07:08

## 2024-08-30 RX ADMIN — BACLOFEN 10 MG: 10 TABLET ORAL at 09:08

## 2024-08-30 RX ADMIN — HYDROCHLOROTHIAZIDE 25 MG: 25 TABLET ORAL at 08:08

## 2024-08-30 RX ADMIN — BACLOFEN 10 MG: 10 TABLET ORAL at 01:08

## 2024-08-30 RX ADMIN — OXCARBAZEPINE 150 MG: 150 TABLET, FILM COATED ORAL at 08:08

## 2024-08-30 RX ADMIN — OXYCODONE AND ACETAMINOPHEN 1 TABLET: 10; 325 TABLET ORAL at 04:08

## 2024-08-30 RX ADMIN — TAMSULOSIN HYDROCHLORIDE 0.4 MG: 0.4 CAPSULE ORAL at 08:08

## 2024-08-30 RX ADMIN — OXYCODONE AND ACETAMINOPHEN 1 TABLET: 10; 325 TABLET ORAL at 08:08

## 2024-08-30 RX ADMIN — OXCARBAZEPINE 150 MG: 150 TABLET, FILM COATED ORAL at 09:08

## 2024-08-30 RX ADMIN — CARVEDILOL 12.5 MG: 12.5 TABLET, FILM COATED ORAL at 05:08

## 2024-08-30 RX ADMIN — SENNOSIDES AND DOCUSATE SODIUM 2 TABLET: 50; 8.6 TABLET ORAL at 09:08

## 2024-08-30 RX ADMIN — DEXAMETHASONE SODIUM PHOSPHATE 2 MG: 4 INJECTION, SOLUTION INTRA-ARTICULAR; INTRALESIONAL; INTRAMUSCULAR; INTRAVENOUS; SOFT TISSUE at 12:08

## 2024-08-30 RX ADMIN — PREGABALIN 75 MG: 50 CAPSULE ORAL at 09:08

## 2024-08-30 RX ADMIN — VENLAFAXINE HYDROCHLORIDE 300 MG: 150 CAPSULE, EXTENDED RELEASE ORAL at 08:08

## 2024-08-30 NOTE — PROGRESS NOTES
Inpatient Nutrition Evaluation    Admit Date: 8/19/2024   Total duration of encounter: 11 days    Nutrition Recommendation/Prescription     Continue oral diet as tolerated; Diet Adult Regular  Diet general     Nutrition Assessment     Chart Review    Reason Seen: length of stay    Malnutrition Screening Tool Results   Have you recently lost weight without trying?: No  Have you been eating poorly because of a decreased appetite?: No   MST Score: 0     Diagnosis:  Post op L2-S1 decompression with L3-S1 fusion     Relevant Medical History:  bipolar disorder, anxiety, depression, and claustrophobia     Nutrition-Related Medications: Scheduled Medications:  atorvastatin, 40 mg, QHS  baclofen, 10 mg, TID  bisacodyL, 10 mg, Daily  carvediloL, 12.5 mg, BID WM  dexAMETHasone, 2 mg, Q6H  diazePAM, 10 mg, Q6H  enoxparin, 40 mg, Daily  lisinopriL, 40 mg, Daily   And  hydroCHLOROthiazide, 25 mg, Daily  OXcarbazepine, 150 mg, BID  pregabalin, 75 mg, QHS  QUEtiapine, 300 mg, Nightly  senna-docusate 8.6-50 mg, 2 tablet, BID  tamsulosin, 1 capsule, QAM  venlafaxine, 300 mg, Daily    Continuous Infusions:  0.9% NaCl    PRN Medications:    atorvastatin tablet 40 mg    baclofen tablet 10 mg    bisacodyL suppository 10 mg    carvediloL tablet 12.5 mg    dexAMETHasone injection 2 mg    diazePAM tablet 10 mg    enoxaparin injection 40 mg    lisinopriL tablet 40 mg    And    hydroCHLOROthiazide tablet 25 mg    OXcarbazepine tablet 150 mg    pregabalin capsule 75 mg    QUEtiapine tablet 300 mg    senna-docusate 8.6-50 mg per tablet 2 tablet    tamsulosin 24 hr capsule 0.4 mg    venlafaxine 24 hr capsule 300 mg        Nutrition-Related Labs:  Recent Labs   Lab 08/29/24  0452 08/30/24  0506   * 134*   K 4.4 3.8   CALCIUM 9.2 8.6*   CL 98 100   CO2 28 24   BUN 20.0 24.2   CREATININE 0.79 0.79   EGFRNORACEVR >60 >60   GLUCOSE 158* 134*   BILITOT 0.6  --    ALKPHOS 96  --    ALT 25  --    AST 19  --    ALBUMIN 3.2*  --    WBC 14.17* 14.06*  "  HGB 10.7* 10.6*   HCT 31.3* 31.2*        Diet Order: Diet Adult Regular  Diet general  Oral Supplement Order: none  Appetite/Oral Intake: good/% of meals  Factors Affecting Nutritional Intake: none identified  Food/Hoahaoism/Cultural Preferences: none reported  Food Allergies: no known food allergies    Retired:Skin Integrity: incision  Wound(s):       Comments    8/26/24 tolerating oral diet, good appetite and intake reported    8/30/24 working with therapy, tolerating oral diet, reports fair appetite and intake of meals    Anthropometrics    Height: 5' 8" (172.7 cm) Height Method: Stated  Last Weight: 104.3 kg (230 lb) (08/21/24 1527) Weight Method: Standard Scale  BMI (Calculated): 35  BMI Classification: obese grade II (BMI 35-39.9)        Ideal Body Weight (IBW), Male: 154 lb     % Ideal Body Weight, Male (lb): 149.35 %                          Usual Weight Provided By: patient denies unintentional weight loss    Wt Readings from Last 5 Encounters:   08/21/24 104.3 kg (230 lb)     Weight Change(s) Since Admission:  Admit Weight: 104.3 kg (230 lb) (08/07/24 1108)      Patient Education    Not applicable.    Monitoring & Evaluation     Dietitian will monitor food and beverage intake.  Nutrition Risk/Follow-Up: low (follow-up in 5-7 days)  Patients assigned 'low nutrition risk' status do not qualify for a full nutritional assessment but will be monitored and re-evaluated in a 5-7 day time period. Please consult if re-evaluation needed sooner.   "

## 2024-08-30 NOTE — PROGRESS NOTES
[facility] Neuro  Neurosurgery  Progress Note    Subjective:     Interval History: Complains of continued back pain radiating into legs. Patient with cervical pain since fall the other day. Non radiating. Localized to posterior neck. Also reports shortness of breath when he ambulates the halls the causes him to rest. Has been having bilateral calf swelling and pain that fluctuates, left worse than right. No urinary symptoms. Incision without signs of infection.     Post-Op Info:  Procedure(s) (LRB):  FUSION, SPINE, LUMBAR, TLIF, USING COMPUTER-ASSISTED NAVIGATION (Left)   11 Days Post-Op      Medications:  Continuous Infusions:   0.9% NaCl   Intravenous Continuous         Scheduled Meds:   atorvastatin  40 mg Oral QHS    baclofen  10 mg Oral TID    bisacodyL  10 mg Rectal Daily    carvediloL  12.5 mg Oral BID WM    dexAMETHasone  2 mg Intravenous Q6H    diazePAM  10 mg Oral Q6H    enoxparin  40 mg Subcutaneous Daily    lisinopriL  40 mg Oral Daily    And    hydroCHLOROthiazide  25 mg Oral Daily    OXcarbazepine  150 mg Oral BID    pregabalin  75 mg Oral QHS    QUEtiapine  300 mg Oral Nightly    senna-docusate 8.6-50 mg  2 tablet Oral BID    tamsulosin  1 capsule Oral QAM    venlafaxine  300 mg Oral Daily     PRN Meds:  Current Facility-Administered Medications:     acetaminophen, 650 mg, Oral, Q6H PRN    acetaminophen, 650 mg, Oral, Q4H PRN    aluminum-magnesium hydroxide-simethicone, 30 mL, Oral, Q4H PRN    calcium carbonate, 500 mg, Oral, Daily PRN    cyclobenzaprine, 10 mg, Oral, TID PRN    hydrALAZINE, 10 mg, Intravenous, Q2H PRN    hydrOXYzine HCL, 25 mg, Oral, TID PRN    ondansetron, 4 mg, Oral, Q6H PRN    oxyCODONE-acetaminophen, 1 tablet, Oral, Q4H PRN    prochlorperazine, 10 mg, Intravenous, Q6H PRN     Review of Systems  Objective:     Weight: 104.3 kg (230 lb)  Body mass index is 34.97 kg/m².  Vital Signs (Most Recent):  Temp: 97.3 °F (36.3 °C) (08/30/24 0730)  Pulse: 89 (08/30/24  "0730)  Resp: 18 (08/30/24 0730)  BP: 123/78 (08/30/24 0730)  SpO2: 96 % (08/30/24 0730) Vital Signs (24h Range):  Temp:  [97.3 °F (36.3 °C)-98.4 °F (36.9 °C)] 97.3 °F (36.3 °C)  Pulse:  [81-96] 89  Resp:  [17-20] 18  SpO2:  [94 %-98 %] 96 %  BP: ()/(57-96) 123/78                              Neurosurgery Physical Exam  Awake, alert, oriented.   NAD. Resting in bed.   Incision CDI, steri strips overlying, no signs or symptoms of infection.   4+/5 motors to bilateral upper and lower extremities.   Pain with dorsiflexion of bilateral calves. No erythema.     Significant Labs:  Recent Labs   Lab 08/29/24  0452 08/30/24  0506   * 134*   K 4.4 3.8   CL 98 100   CO2 28 24   BUN 20.0 24.2   CREATININE 0.79 0.79   CALCIUM 9.2 8.6*     Recent Labs   Lab 08/29/24  0452 08/30/24  0506   WBC 14.17* 14.06*   HGB 10.7* 10.6*   HCT 31.3* 31.2*    298     No results for input(s): "LABPT", "INR", "APTT" in the last 48 hours.  Microbiology Results (last 7 days)       ** No results found for the last 168 hours. **            Assessment/Plan:     Active Diagnoses:      Problems Resolved During this Admission:    Diagnosis Date Noted Date Resolved POA    PRINCIPAL PROBLEM:  Lumbosacral radiculopathy due to degenerative joint disease of spine [M47.27] 08/19/2024 08/28/2024 Yes    Neuritis or radiculitis due to rupture of lumbar intervertebral disc [M51.16] 08/19/2024 08/28/2024 Yes    Other spondylosis with radiculopathy, lumbar region [M47.26] 08/19/2024 08/28/2024 Yes     -WBC continues to be elevated. Will order CXR, UA, procal, d-dimer.   -MRI cervical ordered.   -PTOT with LSO brace  -Pain control  -Weaning steroids  -Rehab once medically cleared.     SMOOTH Mata  Neurosurgery  [facility] - Ortho Neuro    "

## 2024-08-30 NOTE — PROGRESS NOTES
"Hospital Medicine  Progress Note    Patient Name: Lionel Nix  MRN: [id number]  Status: IP- Inpatient   Admission Date: 8/19/2024  Length of Stay: 11  Date of Service: 08/30/2024       CC:  Consultation follow-up for HTN management       SUBJECTIVE   "59 y.o. male who  has a past medical history of Anxiety, Chronic right-sided low back pain, Enlarged prostate, Hyperlipidemia, Hypertension, Lumbosacral radiculopathy due to degenerative joint disease of spine, Neuritis or radiculitis due to rupture of lumbar intervertebral disc, Numbness and tingling of both feet, Numbness and tingling of both legs, Obesity, and Other spondylosis with radiculopathy, lumbar region.. The patient presented to [facility] on 8/19/2024 with a primary complaint of  back pain which  he was admitted to Dr. August and had  L2-s1 decompression with L3-S1 fusion on 8/19/2024. Pt working with therapy services. Pt began to have elevation in his blood pressure despite lisinopril and HCTZ. Blood pressure has ranged from -170 and DBP .  Hospital medicine services consulted for HTN."    8/29/24-Patient seen and examined at bedside, and chart reviewed.  Pressures remains markedly accelerated, as high as 171/119.  He is also complaining of a lot of pain, reports low back.  Also reports posterior neck pain/headache (though his could be secondary to accelerated HTN).      8/30/24 dr hernandez -  chart reviewed and pt examined . Leukocytosis secondary to steroids/ chest x rays is clear. Pt a/a and oriented x 3 . We had a nice conversation about his issues as well as jenna food. He is still in pain which is expected post surgery but is moving well. I even had to remind him to take it slow when moving in room and use his walker.      MEDICATIONS   Scheduled   atorvastatin  40 mg Oral QHS    baclofen  10 mg Oral TID    bisacodyL  10 mg Rectal Daily    carvediloL  12.5 mg Oral BID WM    dexAMETHasone  2 mg Intravenous Q6H    diazePAM  10 mg Oral Q6H    " enoxparin  40 mg Subcutaneous Daily    lisinopriL  40 mg Oral Daily    And    hydroCHLOROthiazide  25 mg Oral Daily    OXcarbazepine  150 mg Oral BID    pregabalin  75 mg Oral QHS    QUEtiapine  300 mg Oral Nightly    senna-docusate 8.6-50 mg  2 tablet Oral BID    tamsulosin  1 capsule Oral QAM    venlafaxine  300 mg Oral Daily     Continuous Infusions          PHYSICAL EXAM   VITALS: T 98.1 °F (36.7 °C)   BP (!) 141/83   P 103   RR 18   O2 95 %    GENERAL: Awake and in NAD  LUNGS: CTA anteriorly  CVS: RRR  GI/: Soft, NT, bowel sounds positive.  EXTREMITIES: No significant LE edema  NEURO: AAOx3  PSYCH: Cooperative      LABS   CBC  Recent Labs     08/29/24  0452 08/30/24  0506   WBC 14.17* 14.06*   RBC 3.45* 3.43*   HGB 10.7* 10.6*   HCT 31.3* 31.2*   MCV 90.7 91.0   MCH 31.0 30.9   MCHC 34.2 34.0   RDW 12.5 12.4    298     CHEM  Recent Labs     08/29/24  0452 08/30/24  0506   * 134*   K 4.4 3.8   CO2 28 24   BUN 20.0 24.2   CREATININE 0.79 0.79   GLUCOSE 158* 134*   CALCIUM 9.2 8.6*   ALBUMIN 3.2*  --    GLOBULIN 3.0  --    ALKPHOS 96  --    ALT 25  --    AST 19  --    BILITOT 0.6  --        DIAGNOSTICS   X-Ray Chest 1 View  Narrative: EXAMINATION:  XR CHEST 1 VIEW    CLINICAL HISTORY:  shortness of breath;    COMPARISON:  9 August 2024    FINDINGS:  Frontal view of the chest was obtained. The heart is not enlarged.  Lungs are clear.  There is no pneumothorax or significant effusion.  Impression: No acute findings.    Electronically signed by: Jb Nunez  Date:    08/30/2024  Time:    09:51        ASSESSMENT   HTN urgency- resolving , component of pain affecting bp  Normocytic Anemia, suspect blood loss related to surgery  Lumbar radiculopathy s/p lumbar decompression L2-S1 and fusion L3-S1 8/19  Leukocytosis 2ry to steroids     PLAN   Pain likely contributing significantly for accelerated pressures,  Pregabalin   lisinopril to 40, carvedilol; continue current HCTZ  Will continue to follow along  and adjust accordingly.  Leukocytosis p2ry to steroids   Progressing as expected           Juan Smith MD  [facility]

## 2024-08-30 NOTE — PT/OT/SLP PROGRESS
Occupational Therapy   Treatment    Name: Lionel Nix  MRN: [id number]  Admitting Diagnosis:  Lumbosacral radiculopathy due to degenerative joint disease of spine  11 Days Post-Op    Recommendations:     Recommended therapy intensity at discharge: High intensity therapy  Discharge Equipment Recommendations:  none  Barriers to discharge:  None    Assessment:     Lionel Nix is a 59 y.o. male with a medical diagnosis of Lumbosacral radiculopathy due to degenerative joint disease of spine, s/p L3-S1 PLIF.  He presents with excellent effort and highly motivated but still with decreased mobility and safety with ambulation from initial eval, still with LE buckling with decrease in UE support on the walker but continues to improve with distance and general LE muscle endurance. Pt continues to be a fall risk and would benefit from further therapy at next level of care. Performance deficits affecting function are weakness, impaired endurance, impaired self care skills, impaired functional mobility.     Rehab Prognosis:  good; patient would benefit from acute skilled OT services to address these deficits and reach maximum level of function.       Plan:     Patient to be seen 5 x/week to address the above listed problems via self-care/home management, therapeutic activities, therapeutic exercises  Plan of Care Expires: 09/26/24  Plan of Care Reviewed with: patient    Subjective     Pain/Comfort:  Pain Rating 1:  (c/o pain down B posterior back and hips shooting into legs)    Objective:     Communicated with: nsdiana prior to session.  Patient found up in chair upon OT entry to room.    General Precautions: Standard, fall    Orthopedic Precautions:spinal precautions  Braces: LSO  Respiratory Status:   Vital Signs:      Occupational Performance:     Bed Mobility:        Functional Mobility/Transfers:  Sit to stand with CGA from chair  Functional Mobility: ambulated on unit x >household distance with min assist, significant support  of Ue's on walker  Activities of Daily Living:  Educated and demo'd throughout on all precautions related to mobility and ADL performance  Practiced figure four positioning with LE's for LB dressing without AD but u/a to fully achieve without increased pain        Therapeutic Activities:       Therapeutic Exercise:issued theraband and educated on UE exercises, pt able to perform independenly      AMPAC 6 Click ADL:      Patient Education:  Patient provided with verbal education and demonstrations education regarding OT role/goals/POC, post op precautions, safety awareness, Discharge/DME recommendations, and AE .  Patient was able to return demonstration.      Patient left up in chair with all lines intact, call button in reach, and PTA present.    GOALS:   Multidisciplinary Problems       Occupational Therapy Goals          Problem: Occupational Therapy    Goal Priority Disciplines Outcome Interventions   Occupational Therapy Goal     OT, PT/OT Progressing    Description: Goals to be met by: 9/17/24     Patient will increase functional independence with ADLs by performing:    UE Dressing with Modified [place].  LE Dressing with Modified [place].with AE prn  Grooming while standing with Modified [place].  Toileting from toilet with Modified [place] for hygiene and clothing management.   Toilet transfer to toilet with Modified [place].                         Time Tracking:     OT Date of Treatment: 08/30/24  OT Start Time: 1350  OT Stop Time: 1415  OT Total Time (min): 25 min  Self care 10 min  Therapeutic exercises 15 min    Billable Minutes:  OT/KRUPA: OT         8/30/2024

## 2024-08-30 NOTE — PLAN OF CARE
WBC still elevated. CXR, procal, UA, and d-dimer ordered. Will d/c to rehab once medically cleared.     Christel Tubbs LCSW

## 2024-08-30 NOTE — PT/OT/SLP PROGRESS
Physical Therapy Treatment    Patient Name:  Lionel Nix   MRN:  [id number]    Recommendations:     Discharge therapy intensity: High Intensity Therapy ,    Discharge Equipment Recommendations: to be determined by next level of care  Barriers to discharge: Impaired mobility    Assessment:     Lionel Nix is a 59 y.o. male admitted with a medical diagnosis of lumbosacral radiculopathy 2/2 DJD, s/p L2-S1 decompression and L3-S1 TLIF on 8/19.  .  He presents with the following impairments/functional limitations: weakness, impaired endurance, impaired self care skills, impaired functional mobility, gait instability, impaired balance, pain, orthopedic precautions, decreased lower extremity function .    Pt BLE still buckling when he takes pressure off BUE while using RW. Pt must place majority of weight through BUE on RW to complete amb trial.     Pt remains a high fall risk.       Rehab Prognosis: Good; patient would benefit from acute skilled PT services to address these deficits and reach maximum level of function.    Recent Surgery: Procedure(s) (LRB):  FUSION, SPINE, LUMBAR, TLIF, USING COMPUTER-ASSISTED NAVIGATION (Left) 11 Days Post-Op    Plan:     During this hospitalization, patient would benefit from acute PT services 6 x/week to address the identified rehab impairments via gait training, therapeutic exercises, therapeutic activities and progress toward the following goals:    Plan of Care Expires:  09/27/24    Subjective     Chief Complaint:   Patient/Family Comments/goals:   Pain/Comfort:  Location 1: back  Pain Addressed 1: Reposition, Distraction      Objective:     Communicated with NSG prior to session.  Patient found up in chair with TRAVIS drain upon PT entry to room.     General Precautions: Standard, fall  Orthopedic Precautions: spinal precautions  Braces: LSO  Respiratory Status: Room air  Blood Pressure:   Skin Integrity: Visible skin intact      Functional Mobility:  Transfers:     Sit to Stand:   minimum assistance with rolling walker and 6 trials from bedside chair   Gait: pt amb 66ft with RW min-modA. Pt with step-to gait pattern and had 1 episode of buckling 2/2 therapist having him try to take weight off RW with BUE.       ~Had pt stand from bedside chair and perform 5 calf raises while BUE supported on rail in hallway. Chair behind pt in case of buckling (not present during activity). 5 trials.     Education:  Patient provided with verbal education education regarding PT role/goals/POC, post-op precautions, fall prevention, safety awareness, and discharge/DME recommendations.  Understanding was verbalized.     Patient left up in chair with all lines intact, call button in reach, and NSG present    GOALS:   Multidisciplinary Problems       Physical Therapy Goals          Problem: Physical Therapy    Goal Priority Disciplines Outcome Goal Variances Interventions   Physical Therapy Goal     PT, PT/OT Progressing     Description: Goals to be met by: 2024     Patient will increase functional independence with mobility by performin. Supine to sit with Modified [place]  2. Sit to supine with Modified [place]  3. Sit to stand transfer with Modified [place]  4. Bed to chair transfer with Modified [place] using Least Restrictive Assistive Device  5. Gait  x 200 feet with Modified [place] using Least Restrictive Assistive Device.                          Time Tracking:     PT Received On: 24  PT Start Time: 1350     PT Stop Time: 1414  PT Total Time (min): 24 min     Billable Minutes: Gait Training 14 and Therapeutic Exercise 10    Treatment Type: Treatment  PT/PTA: PTA     Number of PTA visits since last PT visit: 2024

## 2024-08-31 PROCEDURE — 99900035 HC TECH TIME PER 15 MIN (STAT)

## 2024-08-31 PROCEDURE — 97116 GAIT TRAINING THERAPY: CPT | Mod: CQ

## 2024-08-31 PROCEDURE — 94799 UNLISTED PULMONARY SVC/PX: CPT

## 2024-08-31 PROCEDURE — 99900031 HC PATIENT EDUCATION (STAT)

## 2024-08-31 PROCEDURE — 63600175 PHARM REV CODE 636 W HCPCS

## 2024-08-31 PROCEDURE — 25000003 PHARM REV CODE 250: Performed by: INTERNAL MEDICINE

## 2024-08-31 PROCEDURE — 25000003 PHARM REV CODE 250: Performed by: NEUROLOGICAL SURGERY

## 2024-08-31 PROCEDURE — 63600175 PHARM REV CODE 636 W HCPCS: Performed by: INTERNAL MEDICINE

## 2024-08-31 PROCEDURE — 25000003 PHARM REV CODE 250

## 2024-08-31 PROCEDURE — 11000001 HC ACUTE MED/SURG PRIVATE ROOM

## 2024-08-31 PROCEDURE — 97530 THERAPEUTIC ACTIVITIES: CPT | Mod: CQ

## 2024-08-31 RX ORDER — KETOROLAC TROMETHAMINE 30 MG/ML
30 INJECTION, SOLUTION INTRAMUSCULAR; INTRAVENOUS ONCE
Status: COMPLETED | OUTPATIENT
Start: 2024-08-31 | End: 2024-08-31

## 2024-08-31 RX ADMIN — HYDROCHLOROTHIAZIDE 25 MG: 25 TABLET ORAL at 08:08

## 2024-08-31 RX ADMIN — DEXAMETHASONE SODIUM PHOSPHATE 2 MG: 4 INJECTION, SOLUTION INTRA-ARTICULAR; INTRALESIONAL; INTRAMUSCULAR; INTRAVENOUS; SOFT TISSUE at 05:08

## 2024-08-31 RX ADMIN — OXCARBAZEPINE 150 MG: 150 TABLET, FILM COATED ORAL at 08:08

## 2024-08-31 RX ADMIN — ATORVASTATIN CALCIUM 40 MG: 40 TABLET, FILM COATED ORAL at 08:08

## 2024-08-31 RX ADMIN — ENOXAPARIN SODIUM 40 MG: 40 INJECTION SUBCUTANEOUS at 04:08

## 2024-08-31 RX ADMIN — LISINOPRIL 40 MG: 20 TABLET ORAL at 08:08

## 2024-08-31 RX ADMIN — DIAZEPAM 10 MG: 5 TABLET ORAL at 12:08

## 2024-08-31 RX ADMIN — BACLOFEN 10 MG: 10 TABLET ORAL at 02:08

## 2024-08-31 RX ADMIN — HYDROXYZINE HYDROCHLORIDE 25 MG: 25 TABLET, FILM COATED ORAL at 08:08

## 2024-08-31 RX ADMIN — BISACODYL 10 MG: 10 SUPPOSITORY RECTAL at 08:08

## 2024-08-31 RX ADMIN — BACLOFEN 10 MG: 10 TABLET ORAL at 08:08

## 2024-08-31 RX ADMIN — DIAZEPAM 10 MG: 5 TABLET ORAL at 05:08

## 2024-08-31 RX ADMIN — SENNOSIDES AND DOCUSATE SODIUM 2 TABLET: 50; 8.6 TABLET ORAL at 08:08

## 2024-08-31 RX ADMIN — VENLAFAXINE HYDROCHLORIDE 300 MG: 150 CAPSULE, EXTENDED RELEASE ORAL at 08:08

## 2024-08-31 RX ADMIN — OXYCODONE AND ACETAMINOPHEN 1 TABLET: 10; 325 TABLET ORAL at 08:08

## 2024-08-31 RX ADMIN — OXYCODONE AND ACETAMINOPHEN 1 TABLET: 10; 325 TABLET ORAL at 04:08

## 2024-08-31 RX ADMIN — OXYCODONE AND ACETAMINOPHEN 1 TABLET: 10; 325 TABLET ORAL at 12:08

## 2024-08-31 RX ADMIN — CARVEDILOL 12.5 MG: 12.5 TABLET, FILM COATED ORAL at 08:08

## 2024-08-31 RX ADMIN — DEXAMETHASONE SODIUM PHOSPHATE 2 MG: 4 INJECTION, SOLUTION INTRA-ARTICULAR; INTRALESIONAL; INTRAMUSCULAR; INTRAVENOUS; SOFT TISSUE at 12:08

## 2024-08-31 RX ADMIN — KETOROLAC TROMETHAMINE 30 MG: 30 INJECTION, SOLUTION INTRAMUSCULAR at 03:08

## 2024-08-31 RX ADMIN — PREGABALIN 75 MG: 50 CAPSULE ORAL at 08:08

## 2024-08-31 RX ADMIN — QUETIAPINE FUMARATE 300 MG: 300 TABLET ORAL at 08:08

## 2024-08-31 RX ADMIN — DIAZEPAM 10 MG: 5 TABLET ORAL at 01:08

## 2024-08-31 RX ADMIN — CARVEDILOL 12.5 MG: 12.5 TABLET, FILM COATED ORAL at 04:08

## 2024-08-31 NOTE — PROGRESS NOTES
"Hospital Medicine  Progress Note    Patient Name: Lionel Nix  MRN: [id number]  Status: IP- Inpatient   Admission Date: 8/19/2024  Length of Stay: 12  Date of Service: 08/31/2024       CC:  Consultation follow-up for HTN management       SUBJECTIVE   "59 y.o. male who  has a past medical history of Anxiety, Chronic right-sided low back pain, Enlarged prostate, Hyperlipidemia, Hypertension, Lumbosacral radiculopathy due to degenerative joint disease of spine, Neuritis or radiculitis due to rupture of lumbar intervertebral disc, Numbness and tingling of both feet, Numbness and tingling of both legs, Obesity, and Other spondylosis with radiculopathy, lumbar region.. The patient presented to [facility] on 8/19/2024 with a primary complaint of  back pain which  he was admitted to Dr. August and had  L2-s1 decompression with L3-S1 fusion on 8/19/2024. Pt working with therapy services. Pt began to have elevation in his blood pressure despite lisinopril and HCTZ. Blood pressure has ranged from -170 and DBP .  Hospital medicine services consulted for HTN."    8/29/24-Patient seen and examined at bedside, and chart reviewed.  Pressures remains markedly accelerated, as high as 171/119.  He is also complaining of a lot of pain, reports low back.  Also reports posterior neck pain/headache (though his could be secondary to accelerated HTN).      8/30/24 dr hernandez -  chart reviewed and pt examined . Leukocytosis secondary to steroids/ chest x rays is clear. Pt a/a and oriented x 3 . We had a nice conversation about his issues as well as jenna food. He is still in pain which is expected post surgery but is moving well. I even had to remind him to take it slow when moving in room and use his walker.        8/31/ dr hernandez - having family drama/plus family calling threatening staff  as well as pt is too impulsive . Could not stay still for mri lumbar .      MEDICATIONS   Scheduled   atorvastatin  40 mg Oral QHS    baclofen  10 " mg Oral TID    bisacodyL  10 mg Rectal Daily    carvediloL  12.5 mg Oral BID WM    dexAMETHasone  2 mg Intravenous Q6H    diazePAM  10 mg Oral Q6H    enoxparin  40 mg Subcutaneous Daily    lisinopriL  40 mg Oral Daily    And    hydroCHLOROthiazide  25 mg Oral Daily    OXcarbazepine  150 mg Oral BID    pregabalin  75 mg Oral QHS    QUEtiapine  300 mg Oral Nightly    senna-docusate 8.6-50 mg  2 tablet Oral BID    tamsulosin  1 capsule Oral QAM    venlafaxine  300 mg Oral Daily     Continuous Infusions          PHYSICAL EXAM   VITALS: T 98.2 °F (36.8 °C)   /77   P (!) 113   RR 16   O2 97 %    GENERAL: Awake and in NAD  LUNGS: CTA anteriorly  CVS: RRR  GI/: Soft, NT, bowel sounds positive.  EXTREMITIES: No significant LE edema  NEURO: AAOx3  PSYCH: Cooperative      LABS   CBC  Recent Labs     08/29/24  0452 08/30/24  0506   WBC 14.17* 14.06*   RBC 3.45* 3.43*   HGB 10.7* 10.6*   HCT 31.3* 31.2*   MCV 90.7 91.0   MCH 31.0 30.9   MCHC 34.2 34.0   RDW 12.5 12.4    298     CHEM  Recent Labs     08/29/24  0452 08/30/24  0506   * 134*   K 4.4 3.8   CO2 28 24   BUN 20.0 24.2   CREATININE 0.79 0.79   GLUCOSE 158* 134*   CALCIUM 9.2 8.6*   ALBUMIN 3.2*  --    GLOBULIN 3.0  --    ALKPHOS 96  --    ALT 25  --    AST 19  --    BILITOT 0.6  --        DIAGNOSTICS   X-Ray Chest 1 View  Narrative: EXAMINATION:  XR CHEST 1 VIEW    CLINICAL HISTORY:  shortness of breath;    COMPARISON:  9 August 2024    FINDINGS:  Frontal view of the chest was obtained. The heart is not enlarged.  Lungs are clear.  There is no pneumothorax or significant effusion.  Impression: No acute findings.    Electronically signed by: Jb Nunez  Date:    08/30/2024  Time:    09:51        ASSESSMENT   HTN urgency- resolving , component of pain affecting bp  Normocytic Anemia, suspect blood loss related to surgery  Lumbar radiculopathy s/p lumbar decompression L2-S1 and fusion L3-S1 8/19  Leukocytosis 2ry to steroids     PLAN   Pain likely  contributing significantly for accelerated pressures,  Pregabalin   lisinopril to 40, carvedilol; continue current HCTZ  Will continue to follow along and adjust accordingly.  Leukocytosis 2ry to steroids   Progressing as expected           Juan Smith MD  [facility]

## 2024-08-31 NOTE — PT/OT/SLP PROGRESS
Physical Therapy Treatment    Patient Name:  Lionel Nix   MRN:  [id number]    Recommendations:     Discharge therapy intensity: High Intensity Therapy   Discharge Equipment Recommendations: to be determined by next level of care  Barriers to discharge: Decreased caregiver support, Impaired mobility, and Ongoing medical needs    Assessment:     Lionel Nix is a 59 y.o. male.  He presents with the following impairments/functional limitations: weakness, impaired endurance, impaired self care skills, impaired functional mobility, gait instability, impaired balance, pain, orthopedic precautions, decreased lower extremity function.    Rehab Prognosis: Good; patient would benefit from acute skilled PT services to address these deficits and reach maximum level of function.    Recent Surgery: Procedure(s) (LRB):  FUSION, SPINE, LUMBAR, TLIF, USING COMPUTER-ASSISTED NAVIGATION (Left) 12 Days Post-Op    Plan:     During this hospitalization, patient would benefit from acute PT services 6 x/week to address the identified rehab impairments via gait training, therapeutic exercises, therapeutic activities and progress toward the following goals:    Plan of Care Expires:  09/27/24    Subjective     Chief Complaint: none    Objective:     Communicated with nurse prior to session.  Patient found supine with TRAVIS drain upon PT entry to room.     General Precautions: Standard, fall  Orthopedic Precautions: spinal precautions  Braces: LSO  Respiratory Status: Room air  Blood Pressure:   Skin Integrity: Visible skin intact      Functional Mobility:  SBA STS and gait 90 ft RW min assist with no buckle but chair in tow to ensure pt's safety.   Gait to bathroom for BM and CNA assisted  Educated on importance to ask for assistance when standing.     Education Provided:  Role and goals of PT, transfer training, bed mobility, gait training, balance training, safety awareness, assistive device, strengthening exercises, and importance of  participating in PT to return to PLOF.     Patient left up in chair with all lines intact and call button in reach    GOALS:   Multidisciplinary Problems       Physical Therapy Goals          Problem: Physical Therapy    Goal Priority Disciplines Outcome Goal Variances Interventions   Physical Therapy Goal     PT, PT/OT Progressing     Description: Goals to be met by: 2024     Patient will increase functional independence with mobility by performin. Supine to sit with Modified [place]  2. Sit to supine with Modified [place]  3. Sit to stand transfer with Modified [place]  4. Bed to chair transfer with Modified [place] using Least Restrictive Assistive Device  5. Gait  x 200 feet with Modified [place] using Least Restrictive Assistive Device.                          Time Tracking:     Billable Minutes: Gait Training 15 and Therapeutic Activity 9    Treatment Type: Treatment  PT/PTA: PTA     Number of PTA visits since last PT visit: 2     2024

## 2024-08-31 NOTE — NURSING
Nurses Note -- 4 Eyes      8/30/2024   07:05PM      Skin assessed during: Q Shift Change      [x] No Altered Skin Integrity Present    []Prevention Measures Documented      [] Yes- Altered Skin Integrity Present or Discovered   [] LDA Added if Not in Epic (Describe Wound)   [] New Altered Skin Integrity was Present on Admit and Documented in LDA   [] Wound Image Taken    Wound Care Consulted? No    Attending Nurse:  Carter Santoro RN/Staff Member:   Leandra

## 2024-08-31 NOTE — NURSING
Nurses Note -- 4 Eyes      8/30/2024   0700      Skin assessed during: Q Shift Change      [x] No Altered Skin Integrity Present    []Prevention Measures Documented      [] Yes- Altered Skin Integrity Present or Discovered   [] LDA Added if Not in Epic (Describe Wound)   [] New Altered Skin Integrity was Present on Admit and Documented in LDA   [] Wound Image Taken    Wound Care Consulted? No    Attending Nurse: Radha Santoro RN/Staff Member:  Leandra

## 2024-08-31 NOTE — NURSING
Nurses Note -- 4 Eyes      8/31/2024   7:07 AM      Skin assessed during: Q Shift Change      [x] No Altered Skin Integrity Present    []Prevention Measures Documented      [] Yes- Altered Skin Integrity Present or Discovered   [] LDA Added if Not in Epic (Describe Wound)   [] New Altered Skin Integrity was Present on Admit and Documented in LDA   [] Wound Image Taken    Wound Care Consulted? No    Attending Nurse:  Ree Santoro RN/Staff Member:   Carter

## 2024-08-31 NOTE — NURSING
Without the help of staff, patient walked himself to the bathroom. Patient was again educated on the potential fall risk and concerns pertaining to getting out of bed without the help of staff.

## 2024-09-01 LAB
ALBUMIN SERPL-MCNC: 2.9 G/DL (ref 3.4–4.8)
ALBUMIN/GLOB SERPL: 1.2 RATIO (ref 1.1–2)
ALP SERPL-CCNC: 105 UNIT/L (ref 40–150)
ALT SERPL-CCNC: 25 UNIT/L (ref 0–55)
ANION GAP SERPL CALC-SCNC: 7 MEQ/L
AST SERPL-CCNC: 21 UNIT/L (ref 5–34)
BASOPHILS # BLD AUTO: 0.01 X10(3)/MCL
BASOPHILS NFR BLD AUTO: 0.1 %
BILIRUB SERPL-MCNC: 0.4 MG/DL
BUN SERPL-MCNC: 21.3 MG/DL (ref 8.4–25.7)
CALCIUM SERPL-MCNC: 8.2 MG/DL (ref 8.8–10)
CHLORIDE SERPL-SCNC: 102 MMOL/L (ref 98–107)
CO2 SERPL-SCNC: 26 MMOL/L (ref 23–31)
CREAT SERPL-MCNC: 0.94 MG/DL (ref 0.73–1.18)
CREAT/UREA NIT SERPL: 23
EOSINOPHIL # BLD AUTO: 0 X10(3)/MCL (ref 0–0.9)
EOSINOPHIL NFR BLD AUTO: 0 %
ERYTHROCYTE [DISTWIDTH] IN BLOOD BY AUTOMATED COUNT: 12.4 % (ref 11.5–17)
GFR SERPLBLD CREATININE-BSD FMLA CKD-EPI: >60 ML/MIN/1.73/M2
GLOBULIN SER-MCNC: 2.4 GM/DL (ref 2.4–3.5)
GLUCOSE SERPL-MCNC: 126 MG/DL (ref 82–115)
HCT VFR BLD AUTO: 30.1 % (ref 42–52)
HGB BLD-MCNC: 9.9 G/DL (ref 14–18)
IMM GRANULOCYTES # BLD AUTO: 0.33 X10(3)/MCL (ref 0–0.04)
IMM GRANULOCYTES NFR BLD AUTO: 2.7 %
LYMPHOCYTES # BLD AUTO: 1.13 X10(3)/MCL (ref 0.6–4.6)
LYMPHOCYTES NFR BLD AUTO: 9.1 %
MAGNESIUM SERPL-MCNC: 2.3 MG/DL (ref 1.6–2.6)
MAYO GENERIC ORDERABLE RESULT: NORMAL
MCH RBC QN AUTO: 30.1 PG (ref 27–31)
MCHC RBC AUTO-ENTMCNC: 32.9 G/DL (ref 33–36)
MCV RBC AUTO: 91.5 FL (ref 80–94)
MONOCYTES # BLD AUTO: 0.78 X10(3)/MCL (ref 0.1–1.3)
MONOCYTES NFR BLD AUTO: 6.3 %
NEUTROPHILS # BLD AUTO: 10.15 X10(3)/MCL (ref 2.1–9.2)
NEUTROPHILS NFR BLD AUTO: 81.8 %
NRBC BLD AUTO-RTO: 0 %
PLATELET # BLD AUTO: 260 X10(3)/MCL (ref 130–400)
PMV BLD AUTO: 8.8 FL (ref 7.4–10.4)
POTASSIUM SERPL-SCNC: 4.6 MMOL/L (ref 3.5–5.1)
PROT SERPL-MCNC: 5.3 GM/DL (ref 5.8–7.6)
RBC # BLD AUTO: 3.29 X10(6)/MCL (ref 4.7–6.1)
SODIUM SERPL-SCNC: 135 MMOL/L (ref 136–145)
WBC # BLD AUTO: 12.4 X10(3)/MCL (ref 4.5–11.5)

## 2024-09-01 PROCEDURE — 85025 COMPLETE CBC W/AUTO DIFF WBC: CPT | Performed by: INTERNAL MEDICINE

## 2024-09-01 PROCEDURE — 63600175 PHARM REV CODE 636 W HCPCS

## 2024-09-01 PROCEDURE — 36415 COLL VENOUS BLD VENIPUNCTURE: CPT | Performed by: INTERNAL MEDICINE

## 2024-09-01 PROCEDURE — 25000003 PHARM REV CODE 250: Performed by: INTERNAL MEDICINE

## 2024-09-01 PROCEDURE — 25000003 PHARM REV CODE 250

## 2024-09-01 PROCEDURE — 11000001 HC ACUTE MED/SURG PRIVATE ROOM

## 2024-09-01 PROCEDURE — 25000003 PHARM REV CODE 250: Performed by: NEUROLOGICAL SURGERY

## 2024-09-01 PROCEDURE — 83735 ASSAY OF MAGNESIUM: CPT | Performed by: INTERNAL MEDICINE

## 2024-09-01 PROCEDURE — 80053 COMPREHEN METABOLIC PANEL: CPT | Performed by: INTERNAL MEDICINE

## 2024-09-01 RX ADMIN — OXCARBAZEPINE 150 MG: 150 TABLET, FILM COATED ORAL at 09:09

## 2024-09-01 RX ADMIN — ENOXAPARIN SODIUM 40 MG: 40 INJECTION SUBCUTANEOUS at 05:09

## 2024-09-01 RX ADMIN — BACLOFEN 10 MG: 10 TABLET ORAL at 02:09

## 2024-09-01 RX ADMIN — DIAZEPAM 10 MG: 5 TABLET ORAL at 11:09

## 2024-09-01 RX ADMIN — OXYCODONE AND ACETAMINOPHEN 1 TABLET: 10; 325 TABLET ORAL at 12:09

## 2024-09-01 RX ADMIN — CARVEDILOL 12.5 MG: 12.5 TABLET, FILM COATED ORAL at 07:09

## 2024-09-01 RX ADMIN — OXYCODONE AND ACETAMINOPHEN 1 TABLET: 10; 325 TABLET ORAL at 05:09

## 2024-09-01 RX ADMIN — DIAZEPAM 10 MG: 5 TABLET ORAL at 12:09

## 2024-09-01 RX ADMIN — OXCARBAZEPINE 150 MG: 150 TABLET, FILM COATED ORAL at 08:09

## 2024-09-01 RX ADMIN — HYDROXYZINE HYDROCHLORIDE 25 MG: 25 TABLET, FILM COATED ORAL at 09:09

## 2024-09-01 RX ADMIN — DEXAMETHASONE SODIUM PHOSPHATE 2 MG: 4 INJECTION, SOLUTION INTRA-ARTICULAR; INTRALESIONAL; INTRAMUSCULAR; INTRAVENOUS; SOFT TISSUE at 12:09

## 2024-09-01 RX ADMIN — HYDROCHLOROTHIAZIDE 25 MG: 25 TABLET ORAL at 09:09

## 2024-09-01 RX ADMIN — DEXAMETHASONE SODIUM PHOSPHATE 2 MG: 4 INJECTION, SOLUTION INTRA-ARTICULAR; INTRALESIONAL; INTRAMUSCULAR; INTRAVENOUS; SOFT TISSUE at 05:09

## 2024-09-01 RX ADMIN — BACLOFEN 10 MG: 10 TABLET ORAL at 08:09

## 2024-09-01 RX ADMIN — SENNOSIDES AND DOCUSATE SODIUM 2 TABLET: 50; 8.6 TABLET ORAL at 09:09

## 2024-09-01 RX ADMIN — OXYCODONE AND ACETAMINOPHEN 1 TABLET: 10; 325 TABLET ORAL at 09:09

## 2024-09-01 RX ADMIN — VENLAFAXINE HYDROCHLORIDE 300 MG: 150 CAPSULE, EXTENDED RELEASE ORAL at 07:09

## 2024-09-01 RX ADMIN — DIAZEPAM 10 MG: 5 TABLET ORAL at 05:09

## 2024-09-01 RX ADMIN — OXYCODONE AND ACETAMINOPHEN 1 TABLET: 10; 325 TABLET ORAL at 02:09

## 2024-09-01 RX ADMIN — LISINOPRIL 40 MG: 20 TABLET ORAL at 09:09

## 2024-09-01 RX ADMIN — PREGABALIN 75 MG: 50 CAPSULE ORAL at 08:09

## 2024-09-01 RX ADMIN — TAMSULOSIN HYDROCHLORIDE 0.4 MG: 0.4 CAPSULE ORAL at 09:09

## 2024-09-01 RX ADMIN — CYCLOBENZAPRINE 10 MG: 10 TABLET, FILM COATED ORAL at 05:09

## 2024-09-01 RX ADMIN — SENNOSIDES AND DOCUSATE SODIUM 2 TABLET: 50; 8.6 TABLET ORAL at 08:09

## 2024-09-01 RX ADMIN — CARVEDILOL 12.5 MG: 12.5 TABLET, FILM COATED ORAL at 05:09

## 2024-09-01 RX ADMIN — BISACODYL 10 MG: 10 SUPPOSITORY RECTAL at 09:09

## 2024-09-01 RX ADMIN — ATORVASTATIN CALCIUM 40 MG: 40 TABLET, FILM COATED ORAL at 08:09

## 2024-09-01 RX ADMIN — QUETIAPINE FUMARATE 300 MG: 300 TABLET ORAL at 08:09

## 2024-09-01 RX ADMIN — DEXAMETHASONE SODIUM PHOSPHATE 2 MG: 4 INJECTION, SOLUTION INTRA-ARTICULAR; INTRALESIONAL; INTRAMUSCULAR; INTRAVENOUS; SOFT TISSUE at 11:09

## 2024-09-01 RX ADMIN — BACLOFEN 10 MG: 10 TABLET ORAL at 09:09

## 2024-09-01 NOTE — NURSING
Nurses Note -- 4 Eyes      8/31/2024   7:33 PM      Skin assessed during: Q Shift Change      [x] No Altered Skin Integrity Present    []Prevention Measures Documented      [] Yes- Altered Skin Integrity Present or Discovered   [] LDA Added if Not in Epic (Describe Wound)   [] New Altered Skin Integrity was Present on Admit and Documented in LDA   [] Wound Image Taken    Wound Care Consulted? No    Attending Nurse:  Vinay Santoro RN/Staff Member:  Ree

## 2024-09-01 NOTE — NURSING
Nurses Note -- 4 Eyes      9/1/2024   7:21 AM      Skin assessed during: Q Shift Change      [x] No Altered Skin Integrity Present    []Prevention Measures Documented      [] Yes- Altered Skin Integrity Present or Discovered   [] LDA Added if Not in Epic (Describe Wound)   [] New Altered Skin Integrity was Present on Admit and Documented in LDA   [] Wound Image Taken    Wound Care Consulted? No    Attending Nurse:  Ree Santoro RN/Staff Member:   Vinay

## 2024-09-01 NOTE — PROGRESS NOTES
Hospital Medicine  Progress Note    Patient Name: Lionel Nix  MRN: [id number]  Status: IP- Inpatient   Admission Date: 8/19/2024  Length of Stay: 13  Date of Service: 09/01/2024       CC:  Consultation follow-up for HTN management       SUBJECTIVE   59 y.o. male with a medical history that includes HTN and worsening lumbosacral radiculopathy was admitted to Neurosurgical services and underwent  L2-S1 decompression with L3-S1 fusion on 8/19/2024.  Blood pressures were accelerated post-operatively despite resumption of home antihypertensives, lisinopril and HCTZ and metoprolol.  Hospital medicine services consulted for assistance with HTN management.  Antihypertensices were adjusted, lisinopril increased to 40, metoprolol changed to carvedilol, and HCTZ continued.  Pain likely contributing to pressures, started on Lyrica as well.    Today: Chart reviewed; patient seen and examined at bedside.  Pressures are under fair control now.  Pain seems better controlled as well.      MEDICATIONS   Scheduled   atorvastatin  40 mg Oral QHS    baclofen  10 mg Oral TID    bisacodyL  10 mg Rectal Daily    carvediloL  12.5 mg Oral BID WM    dexAMETHasone  2 mg Intravenous Q6H    diazePAM  10 mg Oral Q6H    enoxparin  40 mg Subcutaneous Daily    lisinopriL  40 mg Oral Daily    And    hydroCHLOROthiazide  25 mg Oral Daily    OXcarbazepine  150 mg Oral BID    pregabalin  75 mg Oral QHS    QUEtiapine  300 mg Oral Nightly    senna-docusate 8.6-50 mg  2 tablet Oral BID    tamsulosin  1 capsule Oral QAM    venlafaxine  300 mg Oral Daily     Continuous Infusions  None      PHYSICAL EXAM   VITALS: T 97.8 °F (36.6 °C)   BP (!) 155/97   P 84   RR 16   O2 97 %    GENERAL: Awake and in NAD  LUNGS: CTA anteriorly  CVS: RRR  GI/: Soft, NT, bowel sounds positive.  EXTREMITIES: No significant LE edema  NEURO: AAOx3  PSYCH: Cooperative      LABS   CBC  Recent Labs     08/30/24  0506 09/01/24  0742   WBC 14.06* 12.40*   RBC 3.43* 3.29*   HGB 10.6*  9.9*   HCT 31.2* 30.1*   MCV 91.0 91.5   MCH 30.9 30.1   MCHC 34.0 32.9*   RDW 12.4 12.4    260     CHEM  Recent Labs     08/30/24  0506 09/01/24  0742   * 135*   K 3.8 4.6   CO2 24 26   BUN 24.2 21.3   CREATININE 0.79 0.94   GLUCOSE 134* 126*   CALCIUM 8.6* 8.2*   MG  --  2.30   ALBUMIN  --  2.9*   GLOBULIN  --  2.4   ALKPHOS  --  105   ALT  --  25   AST  --  21   BILITOT  --  0.4         ASSESSMENT   HTN urgency, resolving, component of pain  Normocytic Anemia, suspect blood loss related to surgery  Lumbar radiculopathy s/p lumbar decompression L2-S1 and fusion L3-S1 8/19  Leukocytosis s/t steroids    PLAN   Continue current antihypertensives with lisinopril, HCTZ, and carvedilol  Continue to monitor pressure trend  Continue analgesics including Pregabalin (could consider increasing if needed)  Will continue to follow along and adjust antihypertensives accordingly.  Leukocytosis s/t steroids           Aguila Whyte MD  [facility]

## 2024-09-01 NOTE — PROGRESS NOTES
NEUROSURGERY PROGRESS NOTE    POD 13 L2-S1 decompression, L3/4, L4/5, L5/S1 TLIF    Interval  OOB with PT, rec High Intensity Therapy  Pain better controlled  Neurologic exam stable  WBC down to 12     Vital Signs (Most Recent)  Temp: 98.3 °F (36.8 °C) (09/01/24 1455)  Pulse: 88 (09/01/24 1455)  Resp: 16 (09/01/24 1402)  BP: (!) 146/88 (09/01/24 1455)  SpO2: 96 % (09/01/24 1455)    Vital Signs Range (Last 24H):  Temp:  [97.8 °F (36.6 °C)-98.4 °F (36.9 °C)]   Pulse:  []   Resp:  [16-20]   BP: (106-155)/(69-97)   SpO2:  [94 %-98 %]      Data Review: CBC:   Lab Results   Component Value Date    WBC 12.40 (H) 09/01/2024    WBC 4.6 06/28/2023    RBC 3.29 (L) 09/01/2024    HGB 9.9 (L) 09/01/2024    HGB 15.4 06/28/2023    HCT 30.1 (L) 09/01/2024    HCT 44.9 06/28/2023     09/01/2024     06/28/2023     BMP:   Lab Results   Component Value Date     (L) 09/01/2024    K 4.6 09/01/2024     09/01/2024    CO2 26 09/01/2024    BUN 21.3 09/01/2024    CREATININE 0.94 09/01/2024    CALCIUM 8.2 (L) 09/01/2024     Coagulation:   Lab Results   Component Value Date    INR 0.9 08/09/2024    APTT 26.4 08/09/2024       Exam:  Awake, alert  4+/5 throughout  SILT  LSO brace in place     PLAN  Pain control  Mobilize as tolerated, PT/OT  MRI C spine pending  Hospitalist following  Dispo as able     Mauricio Moncada MD  Neurosurgery

## 2024-09-02 LAB
ALBUMIN SERPL-MCNC: 3.4 G/DL (ref 3.4–4.8)
ALBUMIN/GLOB SERPL: 1.3 RATIO (ref 1.1–2)
ALP SERPL-CCNC: 127 UNIT/L (ref 40–150)
ALT SERPL-CCNC: 28 UNIT/L (ref 0–55)
ANION GAP SERPL CALC-SCNC: 7 MEQ/L
AST SERPL-CCNC: 24 UNIT/L (ref 5–34)
BASOPHILS # BLD AUTO: 0.02 X10(3)/MCL
BASOPHILS NFR BLD AUTO: 0.1 %
BILIRUB SERPL-MCNC: 0.4 MG/DL
BUN SERPL-MCNC: 17.9 MG/DL (ref 8.4–25.7)
CALCIUM SERPL-MCNC: 8.9 MG/DL (ref 8.8–10)
CHLORIDE SERPL-SCNC: 98 MMOL/L (ref 98–107)
CO2 SERPL-SCNC: 27 MMOL/L (ref 23–31)
CREAT SERPL-MCNC: 0.74 MG/DL (ref 0.73–1.18)
CREAT/UREA NIT SERPL: 24
EOSINOPHIL # BLD AUTO: 0.01 X10(3)/MCL (ref 0–0.9)
EOSINOPHIL NFR BLD AUTO: 0.1 %
ERYTHROCYTE [DISTWIDTH] IN BLOOD BY AUTOMATED COUNT: 12.6 % (ref 11.5–17)
GFR SERPLBLD CREATININE-BSD FMLA CKD-EPI: >60 ML/MIN/1.73/M2
GLOBULIN SER-MCNC: 2.7 GM/DL (ref 2.4–3.5)
GLUCOSE SERPL-MCNC: 102 MG/DL (ref 82–115)
HCT VFR BLD AUTO: 34 % (ref 42–52)
HGB BLD-MCNC: 11.2 G/DL (ref 14–18)
IMM GRANULOCYTES # BLD AUTO: 0.36 X10(3)/MCL (ref 0–0.04)
IMM GRANULOCYTES NFR BLD AUTO: 2.7 %
LYMPHOCYTES # BLD AUTO: 1.24 X10(3)/MCL (ref 0.6–4.6)
LYMPHOCYTES NFR BLD AUTO: 9.2 %
MCH RBC QN AUTO: 30.4 PG (ref 27–31)
MCHC RBC AUTO-ENTMCNC: 32.9 G/DL (ref 33–36)
MCV RBC AUTO: 92.4 FL (ref 80–94)
MONOCYTES # BLD AUTO: 0.86 X10(3)/MCL (ref 0.1–1.3)
MONOCYTES NFR BLD AUTO: 6.4 %
NEUTROPHILS # BLD AUTO: 10.95 X10(3)/MCL (ref 2.1–9.2)
NEUTROPHILS NFR BLD AUTO: 81.5 %
NRBC BLD AUTO-RTO: 0 %
PLATELET # BLD AUTO: 287 X10(3)/MCL (ref 130–400)
PMV BLD AUTO: 8.7 FL (ref 7.4–10.4)
POTASSIUM SERPL-SCNC: 4.4 MMOL/L (ref 3.5–5.1)
PROT SERPL-MCNC: 6.1 GM/DL (ref 5.8–7.6)
RBC # BLD AUTO: 3.68 X10(6)/MCL (ref 4.7–6.1)
SODIUM SERPL-SCNC: 132 MMOL/L (ref 136–145)
WBC # BLD AUTO: 13.44 X10(3)/MCL (ref 4.5–11.5)

## 2024-09-02 PROCEDURE — 94760 N-INVAS EAR/PLS OXIMETRY 1: CPT

## 2024-09-02 PROCEDURE — 25000003 PHARM REV CODE 250

## 2024-09-02 PROCEDURE — 25000003 PHARM REV CODE 250: Performed by: INTERNAL MEDICINE

## 2024-09-02 PROCEDURE — 80053 COMPREHEN METABOLIC PANEL: CPT

## 2024-09-02 PROCEDURE — 99900035 HC TECH TIME PER 15 MIN (STAT)

## 2024-09-02 PROCEDURE — 25000003 PHARM REV CODE 250: Performed by: NEUROLOGICAL SURGERY

## 2024-09-02 PROCEDURE — 63600175 PHARM REV CODE 636 W HCPCS

## 2024-09-02 PROCEDURE — 97535 SELF CARE MNGMENT TRAINING: CPT | Mod: CO

## 2024-09-02 PROCEDURE — 11000001 HC ACUTE MED/SURG PRIVATE ROOM

## 2024-09-02 PROCEDURE — 94799 UNLISTED PULMONARY SVC/PX: CPT

## 2024-09-02 PROCEDURE — 99900031 HC PATIENT EDUCATION (STAT)

## 2024-09-02 PROCEDURE — 97116 GAIT TRAINING THERAPY: CPT | Mod: CQ

## 2024-09-02 PROCEDURE — 85025 COMPLETE CBC W/AUTO DIFF WBC: CPT

## 2024-09-02 PROCEDURE — 36415 COLL VENOUS BLD VENIPUNCTURE: CPT

## 2024-09-02 PROCEDURE — 97530 THERAPEUTIC ACTIVITIES: CPT | Mod: CQ

## 2024-09-02 RX ORDER — GUAIFENESIN 600 MG/1
600 TABLET, EXTENDED RELEASE ORAL 2 TIMES DAILY
Status: DISCONTINUED | OUTPATIENT
Start: 2024-09-02 | End: 2024-09-04 | Stop reason: HOSPADM

## 2024-09-02 RX ORDER — DEXAMETHASONE SODIUM PHOSPHATE 4 MG/ML
2 INJECTION, SOLUTION INTRA-ARTICULAR; INTRALESIONAL; INTRAMUSCULAR; INTRAVENOUS; SOFT TISSUE EVERY 12 HOURS
Status: DISCONTINUED | OUTPATIENT
Start: 2024-09-02 | End: 2024-09-04 | Stop reason: HOSPADM

## 2024-09-02 RX ADMIN — BACLOFEN 10 MG: 10 TABLET ORAL at 08:09

## 2024-09-02 RX ADMIN — ATORVASTATIN CALCIUM 40 MG: 40 TABLET, FILM COATED ORAL at 08:09

## 2024-09-02 RX ADMIN — PREGABALIN 75 MG: 50 CAPSULE ORAL at 08:09

## 2024-09-02 RX ADMIN — VENLAFAXINE HYDROCHLORIDE 300 MG: 150 CAPSULE, EXTENDED RELEASE ORAL at 08:09

## 2024-09-02 RX ADMIN — CYCLOBENZAPRINE 10 MG: 10 TABLET, FILM COATED ORAL at 12:09

## 2024-09-02 RX ADMIN — CARVEDILOL 12.5 MG: 12.5 TABLET, FILM COATED ORAL at 05:09

## 2024-09-02 RX ADMIN — OXYCODONE AND ACETAMINOPHEN 1 TABLET: 10; 325 TABLET ORAL at 12:09

## 2024-09-02 RX ADMIN — ENOXAPARIN SODIUM 40 MG: 40 INJECTION SUBCUTANEOUS at 05:09

## 2024-09-02 RX ADMIN — DIAZEPAM 10 MG: 5 TABLET ORAL at 12:09

## 2024-09-02 RX ADMIN — DIAZEPAM 10 MG: 5 TABLET ORAL at 05:09

## 2024-09-02 RX ADMIN — LISINOPRIL 40 MG: 20 TABLET ORAL at 08:09

## 2024-09-02 RX ADMIN — SENNOSIDES AND DOCUSATE SODIUM 2 TABLET: 50; 8.6 TABLET ORAL at 08:09

## 2024-09-02 RX ADMIN — OXYCODONE AND ACETAMINOPHEN 1 TABLET: 10; 325 TABLET ORAL at 09:09

## 2024-09-02 RX ADMIN — OXYCODONE AND ACETAMINOPHEN 1 TABLET: 10; 325 TABLET ORAL at 05:09

## 2024-09-02 RX ADMIN — BACLOFEN 10 MG: 10 TABLET ORAL at 03:09

## 2024-09-02 RX ADMIN — OXCARBAZEPINE 150 MG: 150 TABLET, FILM COATED ORAL at 08:09

## 2024-09-02 RX ADMIN — QUETIAPINE FUMARATE 300 MG: 300 TABLET ORAL at 08:09

## 2024-09-02 RX ADMIN — HYDROXYZINE HYDROCHLORIDE 25 MG: 25 TABLET, FILM COATED ORAL at 12:09

## 2024-09-02 RX ADMIN — OXYCODONE AND ACETAMINOPHEN 1 TABLET: 10; 325 TABLET ORAL at 10:09

## 2024-09-02 RX ADMIN — DEXAMETHASONE SODIUM PHOSPHATE 2 MG: 4 INJECTION, SOLUTION INTRA-ARTICULAR; INTRALESIONAL; INTRAMUSCULAR; INTRAVENOUS; SOFT TISSUE at 05:09

## 2024-09-02 RX ADMIN — GUAIFENESIN 600 MG: 600 TABLET, EXTENDED RELEASE ORAL at 12:09

## 2024-09-02 RX ADMIN — CARVEDILOL 12.5 MG: 12.5 TABLET, FILM COATED ORAL at 08:09

## 2024-09-02 RX ADMIN — TAMSULOSIN HYDROCHLORIDE 0.4 MG: 0.4 CAPSULE ORAL at 08:09

## 2024-09-02 RX ADMIN — BISACODYL 10 MG: 10 SUPPOSITORY RECTAL at 08:09

## 2024-09-02 RX ADMIN — DIAZEPAM 10 MG: 5 TABLET ORAL at 11:09

## 2024-09-02 RX ADMIN — GUAIFENESIN 600 MG: 600 TABLET, EXTENDED RELEASE ORAL at 08:09

## 2024-09-02 RX ADMIN — HYDROCHLOROTHIAZIDE 25 MG: 25 TABLET ORAL at 08:09

## 2024-09-02 RX ADMIN — DEXAMETHASONE SODIUM PHOSPHATE 2 MG: 4 INJECTION, SOLUTION INTRA-ARTICULAR; INTRALESIONAL; INTRAMUSCULAR; INTRAVENOUS; SOFT TISSUE at 08:09

## 2024-09-02 RX ADMIN — DEXAMETHASONE SODIUM PHOSPHATE 2 MG: 4 INJECTION, SOLUTION INTRA-ARTICULAR; INTRALESIONAL; INTRAMUSCULAR; INTRAVENOUS; SOFT TISSUE at 12:09

## 2024-09-02 NOTE — NURSING
Nurses Note -- 4 Eyes      9/2/2024   7:08 AM      Skin assessed during: Q Shift Change      [x] No Altered Skin Integrity Present    []Prevention Measures Documented      [] Yes- Altered Skin Integrity Present or Discovered   [] LDA Added if Not in Epic (Describe Wound)   [] New Altered Skin Integrity was Present on Admit and Documented in LDA   [] Wound Image Taken    Wound Care Consulted? No    Attending Nurse:  Ree Santoro RN/Staff Member:   Vinay

## 2024-09-02 NOTE — PROGRESS NOTES
[facility]  Neurosurgery  Progress Note    Subjective:     Interval History:   POD #14 L2-S1 decompression, L3/4, L4/5, L5/S1 TLIF  Patient is sitting in chair. He complains of neck pain and lower back pain. Pain is better controlled. His numbness in his lower extremities are improving. He is waiting for his MRI of his cervical spine. His WBC is 13.44 this AM.     Post-Op Info:  Procedure(s) (LRB):  FUSION, SPINE, LUMBAR, TLIF, USING COMPUTER-ASSISTED NAVIGATION (Left)   14 Days Post-Op      Medications:  Continuous Infusions:  Scheduled Meds:   atorvastatin  40 mg Oral QHS    baclofen  10 mg Oral TID    bisacodyL  10 mg Rectal Daily    carvediloL  12.5 mg Oral BID WM    dexAMETHasone  2 mg Intravenous Q12H    diazePAM  10 mg Oral Q6H    enoxparin  40 mg Subcutaneous Daily    lisinopriL  40 mg Oral Daily    And    hydroCHLOROthiazide  25 mg Oral Daily    OXcarbazepine  150 mg Oral BID    pregabalin  75 mg Oral QHS    QUEtiapine  300 mg Oral Nightly    senna-docusate 8.6-50 mg  2 tablet Oral BID    tamsulosin  1 capsule Oral QAM    venlafaxine  300 mg Oral Daily     PRN Meds:  Current Facility-Administered Medications:     acetaminophen, 650 mg, Oral, Q6H PRN    acetaminophen, 650 mg, Oral, Q4H PRN    aluminum-magnesium hydroxide-simethicone, 30 mL, Oral, Q4H PRN    calcium carbonate, 500 mg, Oral, Daily PRN    cyclobenzaprine, 10 mg, Oral, TID PRN    hydrALAZINE, 10 mg, Intravenous, Q2H PRN    hydrOXYzine HCL, 25 mg, Oral, TID PRN    ondansetron, 4 mg, Oral, Q6H PRN    oxyCODONE-acetaminophen, 1 tablet, Oral, Q4H PRN    prochlorperazine, 10 mg, Intravenous, Q6H PRN     Review of Systems  Objective:     Weight: 104.3 kg (230 lb)  Body mass index is 34.97 kg/m².  Vital Signs (Most Recent):  Temp: 98.9 °F (37.2 °C) (09/02/24 0713)  Pulse: 102 (09/02/24 0846)  Resp: 18 (09/02/24 0536)  BP: 127/89 (09/02/24 0846)  SpO2: 95 % (09/02/24 0713) Vital Signs (24h Range):  Temp:  [97.4 °F (36.3  "°C)-98.9 °F (37.2 °C)] 98.9 °F (37.2 °C)  Pulse:  [] 102  Resp:  [16-19] 18  SpO2:  [95 %-97 %] 95 %  BP: (104-146)/(66-90) 127/89     Date 09/02/24 0700 - 09/03/24 0659   Shift 6262-0660 8812-7035 0966-9480 24 Hour Total   INTAKE   P.O. 220   220   Shift Total(mL/kg) 220(2.1)   220(2.1)   OUTPUT   Urine(mL/kg/hr) 1000   1000   Shift Total(mL/kg) 1000(9.6)   1000(9.6)   Weight (kg) 104.3 104.3 104.3 104.3       Neurosurgery Physical Exam  Awake and alert  Follow commands  BUE and BLE strength 4+/5  LSO brace is currently off patient while patient is in the chair    Significant Labs:  Recent Labs   Lab 09/01/24  0742 09/02/24  0700   * 132*   K 4.6 4.4    98   CO2 26 27   BUN 21.3 17.9   CREATININE 0.94 0.74   CALCIUM 8.2* 8.9   MG 2.30  --      Recent Labs   Lab 09/01/24  0742 09/02/24  0700   WBC 12.40* 13.44*   HGB 9.9* 11.2*   HCT 30.1* 34.0*    287     No results for input(s): "LABPT", "INR", "APTT" in the last 48 hours.  Microbiology Results (last 7 days)       ** No results found for the last 168 hours. **            Significant Diagnostics:      Assessment/Plan:    Plan:  - Floor status  - Pain control  - Continue PT, OT  - SCDs for prophylaxis  - Pending MRI cervical spine without contrast to assess neck trauma due to recent fall in the hospital  - Will likely discharge to rehab      Active Diagnoses:      Problems Resolved During this Admission:    Diagnosis Date Noted Date Resolved POA    PRINCIPAL PROBLEM:  Lumbosacral radiculopathy due to degenerative joint disease of spine [M47.27] 08/19/2024 08/28/2024 Yes    Neuritis or radiculitis due to rupture of lumbar intervertebral disc [M51.16] 08/19/2024 08/28/2024 Yes    Other spondylosis with radiculopathy, lumbar region [M47.26] 08/19/2024 08/28/2024 Yes       MARILIA Robles-BC  Neurosurgery  [facility]    "

## 2024-09-02 NOTE — PT/OT/SLP PROGRESS
Occupational Therapy   Treatment    Name: Lionel Nix  MRN: [id number]    Recommendations:     Recommended therapy intensity at discharge: High Intensity Therapy   Discharge Equipment Recommendations:  none  Barriers to discharge:       Assessment:     Lionel Nix is a 59 y.o. male with a medical diagnosis of Lumbosacral radiculopathy due to degenerative joint disease of spine, s/p L3-S1 PLIF. Performance deficits affecting function are weakness, impaired endurance, impaired self care skills, impaired functional mobility. Tolerated session well and motivated to participate. Continues to rely heavily on UE support on walker to prevent BLE buckling. Remains a high fall risk.    Rehab Prognosis:  Good; patient would benefit from acute skilled OT services to address these deficits and reach maximum level of function.       Plan:     Patient to be seen 5 x/week to address the above listed problems via self-care/home management, therapeutic activities, therapeutic exercises  Plan of Care Expires: 09/26/24  Plan of Care Reviewed with: patient    Subjective     Pain/Comfort:  Pain Rating 1:  (c/o pain down B posterior back and hips shooting into legs)    Objective:     Communicated with: RN prior to session.  Patient found up in chair upon OT entry to room.    General Precautions: Standard, fall    Orthopedic Precautions:spinal precautions  Braces: LSO  Respiratory Status: Room air     Occupational Performance:     Functional Mobility/Transfers:  Patient completed Sit <> Stand Transfer with minimum assistance and moderate assistance  with  rolling walker  x4 trials from chair. VC to push from chair for increased safety and easier transition.   Functional Mobility: ambulated to bathroom with Min A and RW. No LOB.     Activities of Daily Living:  Bathing: performed shower t/f with Min A and VC for technique. Slight LOB when stepping over threshold however able to use grab bars for steadying.   UE dressing: donned LSO with  independence.   LE dressing: doffed socks using reacher with Mod I.   Toileting: performed toilet t/f with Min A and use of grab bars.     Therapeutic Positioning    OT interventions performed during the course of today's session in an effort to prevent and/or reduce acquired pressure injuries:   Education was provided on benefits of and recommendations for therapeutic positioning    [facility] 6 Click ADL:      Patient Education:  Patient provided with verbal education education regarding OT role/goals/POC, fall prevention, and safety awareness.  Understanding was verbalized.      Patient left up in chair with all lines intact and call button in reach.    GOALS:   Multidisciplinary Problems       Occupational Therapy Goals          Problem: Occupational Therapy    Goal Priority Disciplines Outcome Interventions   Occupational Therapy Goal     OT, PT/OT Progressing    Description: Goals to be met by: 9/17/24     Patient will increase functional independence with ADLs by performing:    UE Dressing with Modified Hand.  LE Dressing with Modified Hand.with AE prn  Grooming while standing with Modified Hand.  Toileting from toilet with Modified Hand for hygiene and clothing management.   Toilet transfer to toilet with Modified Hand.                         Time Tracking:     OT Date of Treatment: 09/02/24  OT Start Time: 0839  OT Stop Time: 0906  OT Total Time (min): 27 min    Billable Minutes:Self Care/Home Management 27    OT/KRUPA: KRUPA     Number of KRUPA visits since last OT visit: 2    9/2/2024

## 2024-09-02 NOTE — PROGRESS NOTES
Hospital Medicine  Progress Note    Patient Name: Lionel Nix  MRN: [id number]  Status: IP- Inpatient   Admission Date: 8/19/2024  Length of Stay: 14  Date of Service: 09/02/2024       CC:  Consultation follow-up for HTN management       SUBJECTIVE   59 y.o. male with a medical history that includes HTN and worsening lumbosacral radiculopathy was admitted to Neurosurgical services and underwent  L2-S1 decompression with L3-S1 fusion on 8/19/2024.  Blood pressures were accelerated post-operatively despite resumption of home antihypertensives, lisinopril and HCTZ and metoprolol.  Hospital medicine services consulted for assistance with HTN management.  Antihypertensices were adjusted, lisinopril increased to 40, metoprolol changed to carvedilol, and HCTZ continued.  Pain likely contributing to pressures, started on Lyrica as well.    Today: Chart reviewed; patient seen and examined at bedside.  Pressure remain under fair control.  Complaining of cough with mucous.      MEDICATIONS   Scheduled   atorvastatin  40 mg Oral QHS    baclofen  10 mg Oral TID    bisacodyL  10 mg Rectal Daily    carvediloL  12.5 mg Oral BID WM    dexAMETHasone  2 mg Intravenous Q12H    diazePAM  10 mg Oral Q6H    enoxparin  40 mg Subcutaneous Daily    guaiFENesin  600 mg Oral BID    lisinopriL  40 mg Oral Daily    And    hydroCHLOROthiazide  25 mg Oral Daily    OXcarbazepine  150 mg Oral BID    pregabalin  75 mg Oral QHS    QUEtiapine  300 mg Oral Nightly    senna-docusate 8.6-50 mg  2 tablet Oral BID    tamsulosin  1 capsule Oral QAM    venlafaxine  300 mg Oral Daily     Continuous Infusions  None      PHYSICAL EXAM   VITALS: T 97.8 °F (36.6 °C)   BP (!) 150/95   P 84   RR 18   O2 (!) 94 %    GENERAL: Awake and in NAD  LUNGS: CTA anteriorly  CVS: RRR  GI/: Soft, NT, bowel sounds positive.  EXTREMITIES: No significant LE edema  NEURO: AAOx3  PSYCH: Cooperative      LABS   CBC  Recent Labs     09/01/24  0742 09/02/24  0700   WBC 12.40* 13.44*    RBC 3.29* 3.68*   HGB 9.9* 11.2*   HCT 30.1* 34.0*   MCV 91.5 92.4   MCH 30.1 30.4   MCHC 32.9* 32.9*   RDW 12.4 12.6    287     CHEM  Recent Labs     09/01/24  0742 09/02/24  0700   * 132*   K 4.6 4.4   CO2 26 27   BUN 21.3 17.9   CREATININE 0.94 0.74   GLUCOSE 126* 102   CALCIUM 8.2* 8.9   MG 2.30  --    ALBUMIN 2.9* 3.4   GLOBULIN 2.4 2.7   ALKPHOS 105 127   ALT 25 28   AST 21 24   BILITOT 0.4 0.4         ASSESSMENT   HTN urgency, resolved  Normocytic Anemia, suspect blood loss related to surgery  Lumbar radiculopathy s/p lumbar decompression L2-S1 and fusion L3-S1 8/19  Leukocytosis, possibly s/t steroids    PLAN   Continue current antihypertensives with lisinopril, HCTZ, and carvedilol  Continue to monitor pressure trend  Continue analgesics including Pregabalin (could consider increasing if needed)  Will continue to follow along and adjust antihypertensives accordingly  Can add mucinex for productive cough, and monitor for fever or worsening leukocytosis          Aguila Whyte MD  [facility] Medicine

## 2024-09-02 NOTE — NURSING
Nurses Note -- 4 Eyes      9/1/2024   7:03 PM      Skin assessed during: Q Shift Change      [x] No Altered Skin Integrity Present    []Prevention Measures Documented      [] Yes- Altered Skin Integrity Present or Discovered   [] LDA Added if Not in Epic (Describe Wound)   [] New Altered Skin Integrity was Present on Admit and Documented in LDA   [] Wound Image Taken    Wound Care Consulted? No    Attending Nurse:  Vinay Santoro RN/Staff Member:  Ree

## 2024-09-02 NOTE — PT/OT/SLP PROGRESS
Physical Therapy Treatment    Patient Name:  Lionel Nix   MRN:  [id number]    Recommendations:     Discharge therapy intensity: High Intensity Therapy   Discharge Equipment Recommendations: to be determined by next level of care  Barriers to discharge:  placement    Assessment:     Lionel Nix is a 59 y.o. male admitted with a medical diagnosis of umbosacral radiculopathy 2/2 DJD, s/p L2-S1 decompression and L3-S1 TLIF on 8/19. .  He presents with the following impairments/functional limitations: weakness, impaired endurance, impaired self care skills, impaired functional mobility, gait instability, impaired balance, pain, orthopedic precautions, decreased lower extremity function .    Rehab Prognosis: Good; patient would benefit from acute skilled PT services to address these deficits and reach maximum level of function.    Recent Surgery: Procedure(s) (LRB):  FUSION, SPINE, LUMBAR, TLIF, USING COMPUTER-ASSISTED NAVIGATION (Left) 14 Days Post-Op    Plan:     During this hospitalization, patient would benefit from acute PT services 6 x/week to address the identified rehab impairments via gait training, therapeutic exercises, therapeutic activities and progress toward the following goals:    Plan of Care Expires:  09/27/24    Subjective     Chief Complaint: BLE weakness  Patient/Family Comments/goals:   Pain/Comfort:         Objective:     Communicated with RN prior to session.  Patient found up in chair with TRAVIS drain upon PT entry to room.     General Precautions: Standard, fall  Orthopedic Precautions: spinal precautions  Braces: LSO  Respiratory Status: Room air  Skin Integrity: Visible skin intact      Functional Mobility:  Transfers:     Sit to Stand:  contact guard assistance with rolling walker  Gait: Pt amb 100ft with RW cga, Knee buckling noted 1x, chair in tow.  Balance: Standing balance cga    Therapeutic Activities/Exercises:  Performed static standing with RW cga while fixing a cup of coffee. c/o BLEs  "wanting to "give out"    Education:  Patient provided with verbal education education regarding PT role/goals/POC, post-op precautions, fall prevention, safety awareness, and discharge/DME recommendations.  Understanding was verbalized, however additional teaching warranted.     Patient left up in chair with all lines intact, call button in reach, and RN notified    GOALS:   Multidisciplinary Problems       Physical Therapy Goals          Problem: Physical Therapy    Goal Priority Disciplines Outcome Goal Variances Interventions   Physical Therapy Goal     PT, PT/OT Progressing     Description: Goals to be met by: 2024     Patient will increase functional independence with mobility by performin. Supine to sit with Modified [place]  2. Sit to supine with Modified [place]  3. Sit to stand transfer with Modified [place]  4. Bed to chair transfer with Modified [place] using Least Restrictive Assistive Device  5. Gait  x 200 feet with Modified [place] using Least Restrictive Assistive Device.                          Time Tracking:     PT Received On: 24  PT Start Time: 1414     PT Stop Time: 1438  PT Total Time (min): 24 min     Billable Minutes: Gait Training 15 and Therapeutic Activity 9    Treatment Type: Treatment  PT/PTA: PTA     Number of PTA visits since last PT visit: 3     2024    "

## 2024-09-03 PROCEDURE — 25000003 PHARM REV CODE 250: Performed by: NEUROLOGICAL SURGERY

## 2024-09-03 PROCEDURE — 99900031 HC PATIENT EDUCATION (STAT)

## 2024-09-03 PROCEDURE — 97116 GAIT TRAINING THERAPY: CPT | Mod: CQ

## 2024-09-03 PROCEDURE — 63600175 PHARM REV CODE 636 W HCPCS

## 2024-09-03 PROCEDURE — 11000001 HC ACUTE MED/SURG PRIVATE ROOM

## 2024-09-03 PROCEDURE — 25000003 PHARM REV CODE 250

## 2024-09-03 PROCEDURE — 94799 UNLISTED PULMONARY SVC/PX: CPT

## 2024-09-03 PROCEDURE — 99900035 HC TECH TIME PER 15 MIN (STAT)

## 2024-09-03 PROCEDURE — 94760 N-INVAS EAR/PLS OXIMETRY 1: CPT

## 2024-09-03 PROCEDURE — 25000003 PHARM REV CODE 250: Performed by: INTERNAL MEDICINE

## 2024-09-03 RX ADMIN — VENLAFAXINE HYDROCHLORIDE 300 MG: 150 CAPSULE, EXTENDED RELEASE ORAL at 08:09

## 2024-09-03 RX ADMIN — OXCARBAZEPINE 150 MG: 150 TABLET, FILM COATED ORAL at 10:09

## 2024-09-03 RX ADMIN — PREGABALIN 75 MG: 50 CAPSULE ORAL at 10:09

## 2024-09-03 RX ADMIN — GUAIFENESIN 600 MG: 600 TABLET, EXTENDED RELEASE ORAL at 08:09

## 2024-09-03 RX ADMIN — CARVEDILOL 12.5 MG: 12.5 TABLET, FILM COATED ORAL at 05:09

## 2024-09-03 RX ADMIN — BISACODYL 10 MG: 10 SUPPOSITORY RECTAL at 08:09

## 2024-09-03 RX ADMIN — SENNOSIDES AND DOCUSATE SODIUM 2 TABLET: 50; 8.6 TABLET ORAL at 08:09

## 2024-09-03 RX ADMIN — ATORVASTATIN CALCIUM 40 MG: 40 TABLET, FILM COATED ORAL at 10:09

## 2024-09-03 RX ADMIN — DEXAMETHASONE SODIUM PHOSPHATE 2 MG: 4 INJECTION, SOLUTION INTRA-ARTICULAR; INTRALESIONAL; INTRAMUSCULAR; INTRAVENOUS; SOFT TISSUE at 10:09

## 2024-09-03 RX ADMIN — SENNOSIDES AND DOCUSATE SODIUM 2 TABLET: 50; 8.6 TABLET ORAL at 10:09

## 2024-09-03 RX ADMIN — BACLOFEN 10 MG: 10 TABLET ORAL at 10:09

## 2024-09-03 RX ADMIN — DIAZEPAM 10 MG: 5 TABLET ORAL at 05:09

## 2024-09-03 RX ADMIN — DEXAMETHASONE SODIUM PHOSPHATE 2 MG: 4 INJECTION, SOLUTION INTRA-ARTICULAR; INTRALESIONAL; INTRAMUSCULAR; INTRAVENOUS; SOFT TISSUE at 08:09

## 2024-09-03 RX ADMIN — OXCARBAZEPINE 150 MG: 150 TABLET, FILM COATED ORAL at 08:09

## 2024-09-03 RX ADMIN — ENOXAPARIN SODIUM 40 MG: 40 INJECTION SUBCUTANEOUS at 05:09

## 2024-09-03 RX ADMIN — CALCIUM CARBONATE (ANTACID) CHEW TAB 500 MG 500 MG: 500 CHEW TAB at 01:09

## 2024-09-03 RX ADMIN — OXYCODONE AND ACETAMINOPHEN 1 TABLET: 10; 325 TABLET ORAL at 04:09

## 2024-09-03 RX ADMIN — GUAIFENESIN 600 MG: 600 TABLET, EXTENDED RELEASE ORAL at 10:09

## 2024-09-03 RX ADMIN — DIAZEPAM 10 MG: 5 TABLET ORAL at 11:09

## 2024-09-03 RX ADMIN — QUETIAPINE FUMARATE 300 MG: 300 TABLET ORAL at 10:09

## 2024-09-03 RX ADMIN — TAMSULOSIN HYDROCHLORIDE 0.4 MG: 0.4 CAPSULE ORAL at 06:09

## 2024-09-03 RX ADMIN — OXYCODONE AND ACETAMINOPHEN 1 TABLET: 10; 325 TABLET ORAL at 01:09

## 2024-09-03 RX ADMIN — OXYCODONE AND ACETAMINOPHEN 1 TABLET: 10; 325 TABLET ORAL at 07:09

## 2024-09-03 RX ADMIN — CARVEDILOL 12.5 MG: 12.5 TABLET, FILM COATED ORAL at 08:09

## 2024-09-03 NOTE — PT/OT/SLP PROGRESS
Physical Therapy Treatment    Patient Name:  Lionel Nix   MRN:  [id number]    Recommendations:     Discharge therapy intensity: High Intensity Therapy ,    Discharge Equipment Recommendations: to be determined by next level of care  Barriers to discharge: Impaired mobility    Assessment:     Lionel Nix is a 59 y.o. male admitted with a medical diagnosis of lumbosacral radiculopathy 2/2 DJD, s/p L2-S1 decompression and L3-S1 TLIF on 8/19.  .  He presents with the following impairments/functional limitations: weakness, impaired endurance, impaired self care skills, impaired functional mobility, gait instability, impaired balance, pain, orthopedic precautions, decreased lower extremity function .    Pt BLE still buckling when he takes pressure off BUE while using RW. Pt must place majority of weight through BUE on RW to complete amb trials.     Pt remains a high fall risk.       Rehab Prognosis: Good; patient would benefit from acute skilled PT services to address these deficits and reach maximum level of function.    Recent Surgery: Procedure(s) (LRB):  FUSION, SPINE, LUMBAR, TLIF, USING COMPUTER-ASSISTED NAVIGATION (Left) 15 Days Post-Op    Plan:     During this hospitalization, patient would benefit from acute PT services 6 x/week to address the identified rehab impairments via gait training, therapeutic exercises, therapeutic activities and progress toward the following goals:    Plan of Care Expires:  09/27/24    Subjective     Chief Complaint:   Patient/Family Comments/goals:   Pain/Comfort:  Location 1: back  Pain Addressed 1: Reposition, Distraction      Objective:     Communicated with NSG prior to session.  Patient found up in chair with TRAVIS drain upon PT entry to room.     General Precautions: Standard, fall  Orthopedic Precautions: spinal precautions  Braces: LSO  Respiratory Status: Room air  Blood Pressure:   Skin Integrity: Visible skin intact      Functional Mobility:  Transfers:     Sit to Stand:   minimum assistance with rolling walker and    Gait: pt amb 48ft 2x with RW min-modA. Pt with step-to gait pattern and had 1 episode of buckling 2/2 therapist having him try to take weight off RW with BUE. Seated rest required between trials 2/2 fatigue.      Time spent adjusting LSO for proper fit.     Education:  Patient provided with verbal education education regarding PT role/goals/POC, post-op precautions, fall prevention, safety awareness, and discharge/DME recommendations.  Understanding was verbalized.     Patient left up in chair with all lines intact, call button in reach, and NSG present    GOALS:   Multidisciplinary Problems       Physical Therapy Goals          Problem: Physical Therapy    Goal Priority Disciplines Outcome Goal Variances Interventions   Physical Therapy Goal     PT, PT/OT Progressing     Description: Goals to be met by: 2024     Patient will increase functional independence with mobility by performin. Supine to sit with Modified [place]  2. Sit to supine with Modified [place]  3. Sit to stand transfer with Modified [place]  4. Bed to chair transfer with Modified [place] using Least Restrictive Assistive Device  5. Gait  x 200 feet with Modified [place] using Least Restrictive Assistive Device.                          Time Tracking:     PT Received On: 24  PT Start Time: 1424     PT Stop Time: 1451  PT Total Time (min): 27 min     Billable Minutes: Gait Training 27    Treatment Type: Treatment  PT/PTA: PTA     Number of PTA visits since last PT visit: 4     2024

## 2024-09-03 NOTE — PLAN OF CARE
F/u with Tere at  [facility] regarding possible admit to rehab today. MRI c-spine still pending read. Also stated she will check with [employer] to make sure auth is still active.     Christel Tubbs, MONICAW    0754 Pt's payor will adjust to pt's admit date.

## 2024-09-03 NOTE — PROGRESS NOTES
Hospital Medicine  Progress Note    Patient Name: Lionel Nix  MRN: [id number]  Status: IP- Inpatient   Admission Date: 8/19/2024  Length of Stay: 15  Date of Service: 09/03/2024       CC:  Consultation follow-up for HTN management       SUBJECTIVE   59 y.o. male with a medical history that includes HTN and worsening lumbosacral radiculopathy was admitted to Neurosurgical services and underwent  L2-S1 decompression with L3-S1 fusion on 8/19/2024.  Blood pressures were accelerated post-operatively despite resumption of home antihypertensives, lisinopril and HCTZ and metoprolol.  Hospital medicine services consulted for assistance with HTN management.  Antihypertensices were adjusted, lisinopril increased to 40, metoprolol changed to carvedilol, and HCTZ continued.  Pain likely contributing to pressures, started on Lyrica as well.    Today: Chart reviewed; patient seen and examined at bedside. Remains stable, including pressures.      MEDICATIONS   Scheduled   atorvastatin  40 mg Oral QHS    baclofen  10 mg Oral TID    bisacodyL  10 mg Rectal Daily    carvediloL  12.5 mg Oral BID WM    dexAMETHasone  2 mg Intravenous Q12H    diazePAM  10 mg Oral Q6H    enoxparin  40 mg Subcutaneous Daily    guaiFENesin  600 mg Oral BID    lisinopriL  40 mg Oral Daily    And    hydroCHLOROthiazide  25 mg Oral Daily    OXcarbazepine  150 mg Oral BID    pregabalin  75 mg Oral QHS    QUEtiapine  300 mg Oral Nightly    senna-docusate 8.6-50 mg  2 tablet Oral BID    tamsulosin  1 capsule Oral QAM    venlafaxine  300 mg Oral Daily     Continuous Infusions  None      PHYSICAL EXAM   VITALS: T 99.1 °F (37.3 °C)   /82   P 82   RR 18   O2 (!) 94 %    GENERAL: Awake and in NAD  LUNGS: CTA anteriorly  CVS: RRR  GI/: Soft, NT, bowel sounds positive.  EXTREMITIES: No significant LE edema  NEURO: AAOx3  PSYCH: Cooperative      LABS   CBC  Recent Labs     09/01/24  0742 09/02/24  0700   WBC 12.40* 13.44*   RBC 3.29* 3.68*   HGB 9.9* 11.2*   HCT  30.1* 34.0*   MCV 91.5 92.4   MCH 30.1 30.4   MCHC 32.9* 32.9*   RDW 12.4 12.6    287     CHEM  Recent Labs     09/01/24  0742 09/02/24  0700   * 132*   K 4.6 4.4   CO2 26 27   BUN 21.3 17.9   CREATININE 0.94 0.74   GLUCOSE 126* 102   CALCIUM 8.2* 8.9   MG 2.30  --    ALBUMIN 2.9* 3.4   GLOBULIN 2.4 2.7   ALKPHOS 105 127   ALT 25 28   AST 21 24   BILITOT 0.4 0.4         ASSESSMENT   HTN urgency, resolved  Normocytic Anemia, suspect blood loss related to surgery  Lumbar radiculopathy s/p lumbar decompression L2-S1 and fusion L3-S1 8/19  Leukocytosis, possibly s/t steroids    PLAN   Continue current antihypertensives with lisinopril, HCTZ, and carvedilol  Continue to monitor pressure trend  Continue analgesics including Pregabalin (could consider increasing if needed)  Will continue to follow along and adjust antihypertensives accordingly  Otherwise management per Neurosurgery          Aguila Whyte MD  [facility] Medicine

## 2024-09-03 NOTE — NURSING
Nurses Note -- 4 Eyes      9/3/2024   3:06 PM      Skin assessed during: Q Shift Change      [] No Altered Skin Integrity Present    []Prevention Measures Documented      [x] Yes- Altered Skin Integrity Present or Discovered   [] LDA Added if Not in Epic (Describe Wound)   [] New Altered Skin Integrity was Present on Admit and Documented in LDA   [] Wound Image Taken    Wound Care Consulted? No    Attending Nurse:  RICHIE Patel     Second RN/Staff Member:   MELODIE Ruiz

## 2024-09-04 ENCOUNTER — HOSPITAL ENCOUNTER (INPATIENT)
Facility: HOSPITAL | Age: 59
LOS: 9 days | Discharge: LEFT BEFORE DISCHARGE | DRG: 560 | End: 2024-09-13
Attending: INTERNAL MEDICINE | Admitting: INTERNAL MEDICINE
Payer: COMMERCIAL

## 2024-09-04 VITALS
OXYGEN SATURATION: 99 % | SYSTOLIC BLOOD PRESSURE: 128 MMHG | HEART RATE: 85 BPM | DIASTOLIC BLOOD PRESSURE: 86 MMHG | BODY MASS INDEX: 34.86 KG/M2 | RESPIRATION RATE: 18 BRPM | WEIGHT: 230 LBS | TEMPERATURE: 98 F | HEIGHT: 68 IN

## 2024-09-04 DIAGNOSIS — M51.16 NEURITIS OR RADICULITIS DUE TO RUPTURE OF LUMBAR INTERVERTEBRAL DISC: ICD-10-CM

## 2024-09-04 DIAGNOSIS — M54.18 RADICULOPATHY OF SACRAL REGION: Primary | ICD-10-CM
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
## 2024-09-04 DIAGNOSIS — F41.1 GAD (GENERALIZED ANXIETY DISORDER): ICD-10-CM
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
## 2024-09-04 DIAGNOSIS — Z98.1 S/P LUMBAR SPINAL FUSION: ICD-10-CM

## 2024-09-04 DIAGNOSIS — M47.27 LUMBOSACRAL RADICULOPATHY DUE TO DEGENERATIVE JOINT DISEASE OF SPINE: ICD-10-CM

## 2024-09-04 DIAGNOSIS — F17.201 TOBACCO USE DISORDER, MODERATE, IN SUSTAINED REMISSION: ICD-10-CM

## 2024-09-04 DIAGNOSIS — I73.9 CLAUDICATION: ICD-10-CM

## 2024-09-04 DIAGNOSIS — J40 BRONCHITIS: ICD-10-CM

## 2024-09-04 PROCEDURE — 25000003 PHARM REV CODE 250: Performed by: NURSE PRACTITIONER

## 2024-09-04 PROCEDURE — 25000003 PHARM REV CODE 250: Performed by: INTERNAL MEDICINE

## 2024-09-04 PROCEDURE — 63600175 PHARM REV CODE 636 W HCPCS: Performed by: NURSE PRACTITIONER

## 2024-09-04 PROCEDURE — 94761 N-INVAS EAR/PLS OXIMETRY MLT: CPT

## 2024-09-04 PROCEDURE — 25000003 PHARM REV CODE 250

## 2024-09-04 PROCEDURE — 99900031 HC PATIENT EDUCATION (STAT)

## 2024-09-04 PROCEDURE — 25000003 PHARM REV CODE 250: Performed by: NEUROLOGICAL SURGERY

## 2024-09-04 PROCEDURE — 11800000 HC REHAB PRIVATE ROOM

## 2024-09-04 PROCEDURE — 94760 N-INVAS EAR/PLS OXIMETRY 1: CPT

## 2024-09-04 PROCEDURE — 63600175 PHARM REV CODE 636 W HCPCS

## 2024-09-04 PROCEDURE — 99900035 HC TECH TIME PER 15 MIN (STAT)

## 2024-09-04 PROCEDURE — 94799 UNLISTED PULMONARY SVC/PX: CPT

## 2024-09-04 RX ORDER — HYDROCODONE BITARTRATE AND ACETAMINOPHEN 5; 325 MG/1; MG/1
1 TABLET ORAL DAILY PRN
Status: DISCONTINUED | OUTPATIENT
Start: 2024-09-04 | End: 2024-09-13 | Stop reason: HOSPADM

## 2024-09-04 RX ORDER — VENLAFAXINE HYDROCHLORIDE 150 MG/1
300 CAPSULE, EXTENDED RELEASE ORAL DAILY
Status: DISCONTINUED | OUTPATIENT
Start: 2024-09-05 | End: 2024-09-09

## 2024-09-04 RX ORDER — ONDANSETRON 4 MG/1
4 TABLET, ORALLY DISINTEGRATING ORAL EVERY 6 HOURS PRN
Status: DISCONTINUED | OUTPATIENT
Start: 2024-09-04 | End: 2024-09-04

## 2024-09-04 RX ORDER — ENOXAPARIN SODIUM 100 MG/ML
40 INJECTION SUBCUTANEOUS EVERY 24 HOURS
Status: DISCONTINUED | OUTPATIENT
Start: 2024-09-04 | End: 2024-09-13 | Stop reason: HOSPADM

## 2024-09-04 RX ORDER — LISINOPRIL 10 MG/1
40 TABLET ORAL DAILY
Status: DISCONTINUED | OUTPATIENT
Start: 2024-09-05 | End: 2024-09-13 | Stop reason: HOSPADM

## 2024-09-04 RX ORDER — DEXAMETHASONE 2 MG/1
2 TABLET ORAL EVERY 12 HOURS
Status: DISCONTINUED | OUTPATIENT
Start: 2024-09-04 | End: 2024-09-13 | Stop reason: HOSPADM

## 2024-09-04 RX ORDER — TAMSULOSIN HYDROCHLORIDE 0.4 MG/1
1 CAPSULE ORAL EVERY MORNING
Status: DISCONTINUED | OUTPATIENT
Start: 2024-09-05 | End: 2024-09-04

## 2024-09-04 RX ORDER — LISINOPRIL AND HYDROCHLOROTHIAZIDE 20; 25 MG/1; MG/1
1 TABLET ORAL DAILY
Status: DISCONTINUED | OUTPATIENT
Start: 2024-09-04 | End: 2024-09-04

## 2024-09-04 RX ORDER — HYDRALAZINE HYDROCHLORIDE 20 MG/ML
10 INJECTION INTRAMUSCULAR; INTRAVENOUS EVERY 4 HOURS PRN
Status: DISCONTINUED | OUTPATIENT
Start: 2024-09-04 | End: 2024-09-13 | Stop reason: HOSPADM

## 2024-09-04 RX ORDER — ATORVASTATIN CALCIUM 40 MG/1
40 TABLET, FILM COATED ORAL NIGHTLY
Status: DISCONTINUED | OUTPATIENT
Start: 2024-09-04 | End: 2024-09-13 | Stop reason: HOSPADM

## 2024-09-04 RX ORDER — LISINOPRIL 10 MG/1
20 TABLET ORAL DAILY
Status: DISCONTINUED | OUTPATIENT
Start: 2024-09-04 | End: 2024-09-04

## 2024-09-04 RX ORDER — CYCLOBENZAPRINE HCL 10 MG
10 TABLET ORAL 3 TIMES DAILY PRN
Status: DISCONTINUED | OUTPATIENT
Start: 2024-09-04 | End: 2024-09-05

## 2024-09-04 RX ORDER — METOPROLOL TARTRATE 1 MG/ML
10 INJECTION, SOLUTION INTRAVENOUS
Status: DISCONTINUED | OUTPATIENT
Start: 2024-09-04 | End: 2024-09-13 | Stop reason: HOSPADM

## 2024-09-04 RX ORDER — ONDANSETRON 4 MG/1
8 TABLET, ORALLY DISINTEGRATING ORAL EVERY 8 HOURS PRN
Status: DISCONTINUED | OUTPATIENT
Start: 2024-09-04 | End: 2024-09-13 | Stop reason: HOSPADM

## 2024-09-04 RX ORDER — LABETALOL HCL 20 MG/4 ML
10 SYRINGE (ML) INTRAVENOUS EVERY 4 HOURS PRN
Status: DISCONTINUED | OUTPATIENT
Start: 2024-09-04 | End: 2024-09-13 | Stop reason: HOSPADM

## 2024-09-04 RX ORDER — NITROGLYCERIN 0.4 MG/1
0.4 TABLET SUBLINGUAL EVERY 5 MIN PRN
Status: DISCONTINUED | OUTPATIENT
Start: 2024-09-04 | End: 2024-09-13 | Stop reason: HOSPADM

## 2024-09-04 RX ORDER — HYDROCHLOROTHIAZIDE 25 MG/1
25 TABLET ORAL DAILY
Status: DISCONTINUED | OUTPATIENT
Start: 2024-09-05 | End: 2024-09-04

## 2024-09-04 RX ORDER — PREGABALIN 75 MG/1
75 CAPSULE ORAL NIGHTLY
Status: DISCONTINUED | OUTPATIENT
Start: 2024-09-04 | End: 2024-09-07

## 2024-09-04 RX ORDER — OXCARBAZEPINE 150 MG/1
150 TABLET, FILM COATED ORAL 2 TIMES DAILY
Status: DISCONTINUED | OUTPATIENT
Start: 2024-09-04 | End: 2024-09-09

## 2024-09-04 RX ORDER — BACLOFEN 5 MG/1
10 TABLET ORAL 3 TIMES DAILY
Status: DISCONTINUED | OUTPATIENT
Start: 2024-09-04 | End: 2024-09-10

## 2024-09-04 RX ORDER — TAMSULOSIN HYDROCHLORIDE 0.4 MG/1
0.4 CAPSULE ORAL NIGHTLY
Status: DISCONTINUED | OUTPATIENT
Start: 2024-09-04 | End: 2024-09-13 | Stop reason: HOSPADM

## 2024-09-04 RX ORDER — BENZONATATE 100 MG/1
100 CAPSULE ORAL 3 TIMES DAILY PRN
Status: DISCONTINUED | OUTPATIENT
Start: 2024-09-04 | End: 2024-09-13 | Stop reason: HOSPADM

## 2024-09-04 RX ORDER — ACETAMINOPHEN 325 MG/1
650 TABLET ORAL EVERY 6 HOURS PRN
Status: DISCONTINUED | OUTPATIENT
Start: 2024-09-04 | End: 2024-09-05

## 2024-09-04 RX ORDER — HYDROXYZINE PAMOATE 50 MG/1
50 CAPSULE ORAL NIGHTLY PRN
Status: DISCONTINUED | OUTPATIENT
Start: 2024-09-04 | End: 2024-09-10

## 2024-09-04 RX ORDER — DIAZEPAM 5 MG/1
10 TABLET ORAL EVERY 6 HOURS PRN
Status: DISCONTINUED | OUTPATIENT
Start: 2024-09-04 | End: 2024-09-05

## 2024-09-04 RX ORDER — OXYCODONE AND ACETAMINOPHEN 10; 325 MG/1; MG/1
1 TABLET ORAL EVERY 4 HOURS PRN
Status: DISCONTINUED | OUTPATIENT
Start: 2024-09-04 | End: 2024-09-05

## 2024-09-04 RX ORDER — DOCUSATE SODIUM 100 MG/1
100 CAPSULE, LIQUID FILLED ORAL 2 TIMES DAILY
Status: DISCONTINUED | OUTPATIENT
Start: 2024-09-04 | End: 2024-09-13 | Stop reason: HOSPADM

## 2024-09-04 RX ORDER — ONDANSETRON HYDROCHLORIDE 2 MG/ML
4 INJECTION, SOLUTION INTRAVENOUS EVERY 8 HOURS PRN
Status: DISCONTINUED | OUTPATIENT
Start: 2024-09-04 | End: 2024-09-13 | Stop reason: HOSPADM

## 2024-09-04 RX ORDER — HYDROCHLOROTHIAZIDE 25 MG/1
25 TABLET ORAL DAILY
Status: DISCONTINUED | OUTPATIENT
Start: 2024-09-05 | End: 2024-09-13 | Stop reason: HOSPADM

## 2024-09-04 RX ORDER — POLYETHYLENE GLYCOL 3350 17 G/17G
17 POWDER, FOR SOLUTION ORAL 2 TIMES DAILY PRN
Status: DISCONTINUED | OUTPATIENT
Start: 2024-09-04 | End: 2024-09-13 | Stop reason: HOSPADM

## 2024-09-04 RX ORDER — CARVEDILOL 6.25 MG/1
12.5 TABLET ORAL 2 TIMES DAILY
Status: DISCONTINUED | OUTPATIENT
Start: 2024-09-04 | End: 2024-09-13 | Stop reason: HOSPADM

## 2024-09-04 RX ORDER — ALPRAZOLAM 0.25 MG/1
0.25 TABLET ORAL 3 TIMES DAILY PRN
Status: DISCONTINUED | OUTPATIENT
Start: 2024-09-04 | End: 2024-09-04

## 2024-09-04 RX ADMIN — VENLAFAXINE HYDROCHLORIDE 300 MG: 150 CAPSULE, EXTENDED RELEASE ORAL at 08:09

## 2024-09-04 RX ADMIN — OXCARBAZEPINE 150 MG: 150 TABLET, FILM COATED ORAL at 08:09

## 2024-09-04 RX ADMIN — GUAIFENESIN 600 MG: 600 TABLET, EXTENDED RELEASE ORAL at 08:09

## 2024-09-04 RX ADMIN — DEXAMETHASONE 2 MG: 2 TABLET ORAL at 08:09

## 2024-09-04 RX ADMIN — CARVEDILOL 12.5 MG: 6.25 TABLET, FILM COATED ORAL at 08:09

## 2024-09-04 RX ADMIN — SENNOSIDES AND DOCUSATE SODIUM 2 TABLET: 50; 8.6 TABLET ORAL at 08:09

## 2024-09-04 RX ADMIN — DIAZEPAM 10 MG: 5 TABLET ORAL at 11:09

## 2024-09-04 RX ADMIN — BACLOFEN 10 MG: 10 TABLET ORAL at 09:09

## 2024-09-04 RX ADMIN — PREGABALIN 75 MG: 75 CAPSULE ORAL at 08:09

## 2024-09-04 RX ADMIN — OXYCODONE HYDROCHLORIDE AND ACETAMINOPHEN 1 TABLET: 10; 325 TABLET ORAL at 11:09

## 2024-09-04 RX ADMIN — ATORVASTATIN CALCIUM 40 MG: 40 TABLET, FILM COATED ORAL at 08:09

## 2024-09-04 RX ADMIN — TAMSULOSIN HYDROCHLORIDE 0.4 MG: 0.4 CAPSULE ORAL at 08:09

## 2024-09-04 RX ADMIN — QUETIAPINE 300 MG: 100 TABLET ORAL at 08:09

## 2024-09-04 RX ADMIN — ENOXAPARIN SODIUM 40 MG: 40 INJECTION SUBCUTANEOUS at 04:09

## 2024-09-04 RX ADMIN — DOCUSATE SODIUM 100 MG: 100 CAPSULE, LIQUID FILLED ORAL at 08:09

## 2024-09-04 RX ADMIN — DIAZEPAM 10 MG: 5 TABLET ORAL at 12:09

## 2024-09-04 RX ADMIN — DEXAMETHASONE SODIUM PHOSPHATE 2 MG: 4 INJECTION, SOLUTION INTRA-ARTICULAR; INTRALESIONAL; INTRAMUSCULAR; INTRAVENOUS; SOFT TISSUE at 09:09

## 2024-09-04 RX ADMIN — DIAZEPAM 10 MG: 5 TABLET ORAL at 06:09

## 2024-09-04 RX ADMIN — TAMSULOSIN HYDROCHLORIDE 0.4 MG: 0.4 CAPSULE ORAL at 06:09

## 2024-09-04 RX ADMIN — BACLOFEN 10 MG: 5 TABLET ORAL at 10:09

## 2024-09-04 RX ADMIN — OXYCODONE HYDROCHLORIDE AND ACETAMINOPHEN 1 TABLET: 10; 325 TABLET ORAL at 03:09

## 2024-09-04 RX ADMIN — CARVEDILOL 12.5 MG: 12.5 TABLET, FILM COATED ORAL at 08:09

## 2024-09-04 NOTE — CONSULTS
[facility] - Rehab Inpatient Services  Inpatient Rehab Prescreen    PATIENT INFORMATION     Assessment date/time: 9/4/24 1022    Name: Lionel Nix Phone: [phone]   Address:   [address] [place] [id number] SSN:    YOB: 1965 Age: 59 y.o. Gender: male   Race: White   Marital Status:    Advance Directives: Full code     COVERAGE INFORMATION     Patient Medicare #:      Primary Insurance Type: Blue Cross Blue Shield/Bc[employer] All Out of State  /  Secondary Insurance Type:  /    Policy #:  [id number]  Policy #:     Insurance contact name/number: [facility] ph# [phone] (opt 2), Fx# [phone] rehab approved started on 9/3/24- 9/9/24 for 7 days with last covered day of 9/9/24 Insurance contact name/number:    Authorization #: [id number] Authorization #:    Verified Coverage: Insurance Carrier   Pending Coverage:    Prescription Coverage:  Insurance details/comments:      PHYSICIAN/REFERRAL INFORMATION     Primary Care Physician: Mitzy, Primary Doctor Attending Physician: Thor King MD   Consulting physician/specialist:  Referring Physician:    Referring facility:  Referring contact name/phone:    Physician details/comments:      CONTACT INFORMATION   Extended Emergency Contact Information  Primary Emergency Contact: Khloe Nix  Mobile Phone: [phone]  Relation: Spouse   needed? No  Secondary Emergency Contact: Clemencia Nix  Mobile Phone: [phone]  Relation: Daughter  Preferred language: English    PRIOR LIVING SITUATION    Patient lives with wife in Daughter's single story home with a threshold entrance      Bedroom location/setup: single story    Bathroom location/setup: walk-in shower without bench or railing, and normal height commode without railing    Equipment at home: rollator walker, cane    Prior device use:  cane      PRIOR LEVEL OF FUNCTION     Did a helper need to assist with the following activities prior to the current  illness, exacerbation, or injury?   Self-care:   No, independent 7 months ago    Indoor mobility:  No, independent 7 months ago    Stairs: No, independent 7 months ago    Functional Cognition:  No, independent    Comments: Patient was completely independent for mobility and all ADLs without use of equipment 7 months ago.    Caregivers providing assistance:Khloe Nix- spouse- [phone]    Pre-hospital home care service: none  Name of Agency:    Home/personal responsibilities: Personal ADLs, working, driving, managing finances, and managing medications. Wife and daughter manage home, cooking, and cleaning.    Pre-hospital vocational category: Employed   Pre-hospital vocational effort: Employed full time   Occupation/profession:  Return to work/school plan:    Educational history:    Hobbies/leisure activities:  Resources used prior to admission:    Available resources:  Resource information comments:      REHABILITATION DIAGNOSIS     History of present illness: This is a 59 year old male with a PMH of HTN, HLD, severe lumbar stenosis with lumbosacral radiculopathy and degenerative joint disease of lumbar spine who admitted to [facility] on 8/19/24, and underwent a TLIF L2-S1 decompression and fusion by Dr. August. Drain was placed to site. Patient placed on spinal precautions with LSO brace. On 8/20, PT/OT evals completed with impairments noted of weakness, impaired balance, orthopedic precautions, pain, impaired self care skills, impaired endurance, and impairedfunctional mobility. Labs showed low RBC of 3.09, low H&H of 9.8 & 27.6, low potassium of 3.0, elevated glucose of 118, low calcium of 7.8. On 8/21, patient complained of right posterior leg pain which worsened with sitting. TRAVIS drain remained in place. Decadron was increased. On 8/22, patient complained of continued leg pain and numbness so CT of lumbar spine was completed which showed postoperative changes without evidence of acute abnormality. Patient had  continued buckling of BLE with therapy noted with high risk for falls. On 8/23, TRAVIS drain had 20ml output so drain was removed. Patient reported depression due to current situation so elijah was consulted. On 8/24, psych was consulted for depression and anxiety with recommendation to continue Effexor XR 300mg daily, continue seroquel 300mg at bedtime, added Trileptal 150mg BID, added Ambien PRN, and added Hydroxyzine 25mg TID PRN intense anxiety episodes. Patient will also need outpatient followup with therapist. On 8/25, patient fell after using restroom, Dr August was notified of fall, and Xrays ordered. On 8/26, Lumbar spine XR showed Postoperative changes of posterior fusion with intact hardware. Sacrum XR showed No fractures or dislocations are clearly identified, Degenerative changes. Patient complained of back pain rating at 10 on 1-10 scale. Left knee XR showed no acute abnormalities seen. On 8/27, Cervical XR showed Limited exam with some degenerative changes and anterolisthesis as described above but essentially nondiagnostic other imaging modalities and or repeat exam is suggested. On 8/28, Patient BP uncontrolled so hospitialist was consulted for hypertension management. Patient was started on Metoprolol 50mg PO BID. On 8/29, BP remained elevated so increased Lisinopril to 40mg, changed Metoprolol to Carvedilol, and continued HCTZ. Lyrica was also added due to uncontrolled pain. On 8/30, WBC elevated so ordered Chest XR which was negative for acute findings. D-Dimer was elevated at 0.55, low Na noted at 134. On 9/1, labs showed elevated WBC of 12.40, low H&H of 9.9 & 30.1, low NA of 135, elevated glucose of 126, low calcium of 8.2. On 9/2, MRI of cervical spine was completed with No acute traumatic findings identified; Degenerative listhesis at C4-5 and C6-7. On 9/4, neurosurgeon reviewed MRI with no surgical intervention planned, OK to continue to mobilize, and OK transfer to rehab.   Prior to  hospitalization, patient was living with wife in a single story home with 1 step to enter, has a walk-in shower without railing or bench, and a normal height commode. Patient was modified independent for mobility with use of cane, and was independent for most ADLs but did require setup to minimal assist for lower body dressing due to back pain. Currently, patient is AAOx4; minimal assist for transfers with RW, walked 50ft with RW at minimal to moderate assist with episode of knee buckling noted, setup assist for grooming while seated, modified independent for toilet hygiene but required moderate assist for clothing management, minimal assist for lower body dressing and modified independent for socks with reacher, and setup for eating. Pt. Requires acute inpatient rehab admission with 24-hour nursing and active physician oversight to monitor and manage acute medical comorbid conditions, labs, pain, and functional deficits.  Patient/family will also require teaching and integration of improving functional skills into daily living.  He will also require an individualized, interdisciplinary approach to his care, receiving PT, OT services 3 hours per day, 5 days per week.    Required care cannot be provided at a lower level of care. Patient is anticipated to require approximately 10-12 days LOS with expected discharge    Impairment group (IGC):   Other Orthopaedic 08.9 Etiologic diagnosis/description: Severe lumbar stenosis with radiculopathy and spondylosis s/p TLIF L2-S1 decompression and fusion    Date of onset:  8/16/24 Date of surgery: 8/19/24   Allergies: Gabapentin   Comorbid condition: Anemia, back pain, depression, anxiety, hypertension, hyperlipidemia    Medical/functional conditions requiring inpatient rehabilitation: This patient requires medical management/24-hour nursing of complex   comorbidities ( Severe lumbar stenosis with radiculopathy and spondylosis s/p TLIF L2-S1 decompression and fusion, Anemia,  back pain, depression, hypertension, hyperlipidemia), labs, medications (see medications list), pain, sleep   hygiene, anticoagulation, nutrition, hydration, neurological,   pulmonary, cardiac status, and preventive healthcare.        This patient requires intense therapy and an integrated,   interdisciplinary approach to address safety, impaired mobility,   impaired ADL's (dressing, toileting, grooming, showering), surgical wound care, pulmonary insufficiency, bowel/bladder problems,   preventive healthcare, medication management, integration of   improving functional skills into daily living, and home caregiver   support and training.   Risk for medical/clinical complications: This patient is at risk for the following complications: DVT/PE, pneumonia, fall,  malnutrition, neurological decline, respiratory insufficiency,   worsening activity intolerance, complications from anticoagulation,   Infection, skin breakdown, inadequate   sleep, and constipation.     SPECIAL REHABILITATION NEEDS     IV: 20G right  FA Preferred Language: english         Peripheral IV - Single Lumen 08/24/24 1717 20 G No Anterior;Right Forearm (Active)   Site Assessment Clean;Dry;Intact 09/03/24 1600   Extremity Assessment Distal to IV No abnormal discoloration;No redness;No swelling;No warmth 09/03/24 0800   Line Status Capped;Saline locked 09/03/24 1600   Dressing Status Dry;Clean;Intact 09/03/24 1600   Dressing Intervention Integrity maintained 09/03/24 1600          PRECAUTIONS     Safety/fall precautions: High fall risk and Spine precautions: LSO brace     PAST MEDICAL, SOCIAL, FAMILY HISTORY     Pertinent past medical history:   Past Medical History:   Diagnosis Date    Anxiety     Chronic right-sided low back pain     Enlarged prostate     Hyperlipidemia     Hypertension     Lumbosacral radiculopathy due to degenerative joint disease of spine     Neuritis or radiculitis due to rupture of lumbar intervertebral disc     Numbness and  "tingling of both feet     Numbness and tingling of both legs     Obesity     Other spondylosis with radiculopathy, lumbar region       Has the patient had two or more falls in the past year or any fall with injury in the past year: Yes    Has the patient had major surgery during the 100 days prior to admission: Yes    Family Medical History: No family history on file.   Substance use history:   Social History     Substance and Sexual Activity   Alcohol Use Never     Social History     Tobacco Use   Smoking Status Never   Smokeless Tobacco Never     Social History     Substance and Sexual Activity   Drug Use Never      VITALS   BP:  110/71     Temp: 97.8 °F (36.6 °C)     HR: 68     Resp: 18     SpO2: 98 %     Height: 5' 8" (1.727 m)     Weight: 104.3 kg (230 lb)     BMI: 35          MED/LABS/DIAGNOSITICS   No current facility-administered medications for this encounter.     No current outpatient medications on file.     Facility-Administered Medications Ordered in Other Encounters   Medication Dose Route Frequency Provider Last Rate Last Admin    acetaminophen tablet 650 mg  650 mg Oral Q6H PRN Day, SMOOTH Sánchez        acetaminophen tablet 650 mg  650 mg Oral Q4H PRN Day, SMOOTH Sánchez   650 mg at 08/26/24 1208    aluminum-magnesium hydroxide-simethicone 200-200-20 mg/5 mL suspension 30 mL  30 mL Oral Q4H PRN Day, SMOOTH Sánchez        atorvastatin tablet 40 mg  40 mg Oral QHS Day, SMOOTH Sánchez   40 mg at 09/03/24 2200    baclofen tablet 10 mg  10 mg Oral TID Day, SMOOTH Sánchez   10 mg at 09/04/24 0912    bisacodyL suppository 10 mg  10 mg Rectal Daily Day, SMOOTH Sánchez   10 mg at 09/03/24 0823    calcium carbonate 200 mg calcium (500 mg) chewable tablet 500 mg  500 mg Oral Daily PRN Day, SMOOTH Sánchez   500 mg at 09/03/24 1343    carvediloL tablet 12.5 mg  12.5 mg Oral BID Aguila Cuellar MD   12.5 mg at 09/04/24 0857    cyclobenzaprine tablet 10 mg  10 mg Oral TID PRN Day, SMOOTH Sánchez   10 mg at 09/02/24 " 1257    dexAMETHasone injection 2 mg  2 mg Intravenous Q12H Day, SMOOTH Sánchez   2 mg at 09/04/24 0909    diazePAM tablet 10 mg  10 mg Oral Q6H Car August MD   10 mg at 09/04/24 0610    enoxaparin injection 40 mg  40 mg Subcutaneous Daily Day, SMOOTH Sánchez   40 mg at 09/03/24 1742    guaiFENesin 12 hr tablet 600 mg  600 mg Oral BID Aguila Whyte MD   600 mg at 09/04/24 0857    hydrALAZINE injection 10 mg  10 mg Intravenous Q2H PRN Angelina Joy FNP   10 mg at 08/29/24 0753    lisinopriL tablet 40 mg  40 mg Oral Daily Aguila Whyte MD   40 mg at 09/02/24 0846    And    hydroCHLOROthiazide tablet 25 mg  25 mg Oral Daily Aguila Whyte MD   25 mg at 09/02/24 0846    hydrOXYzine HCL tablet 25 mg  25 mg Oral TID PRN Maribel Tran NP   25 mg at 09/02/24 1257    ondansetron disintegrating tablet 4 mg  4 mg Oral Q6H PRN Day, SMOOTH Sánchez   4 mg at 08/19/24 1629    OXcarbazepine tablet 150 mg  150 mg Oral BID Maribel Tran NP   150 mg at 09/04/24 0857    oxyCODONE-acetaminophen  mg per tablet 1 tablet  1 tablet Oral Q4H PRN Day, SMOOTH Sánchez   1 tablet at 09/03/24 1902    pregabalin capsule 75 mg  75 mg Oral QHS Aguila Whyte MD   75 mg at 09/03/24 2200    prochlorperazine injection Soln 10 mg  10 mg Intravenous Q6H PRN Day, SMOOTH Sánchez        QUEtiapine tablet 300 mg  300 mg Oral Nightly Day, SMOOTH Sánchez   300 mg at 09/03/24 2200    senna-docusate 8.6-50 mg per tablet 2 tablet  2 tablet Oral BID Day, SMOOTH Sánchez   2 tablet at 09/04/24 0856    tamsulosin 24 hr capsule 0.4 mg  1 capsule Oral QAM Day, SMOOTH Sánchez   0.4 mg at 09/04/24 0610    venlafaxine 24 hr capsule 300 mg  300 mg Oral Daily Day, SMOOTH Sánchez   300 mg at 09/04/24 0856      Pertinent lab results:   Lab Results   Component Value Date    WBC 13.44 (H) 09/02/2024    HGB 11.2 (L) 09/02/2024    HCT 34.0 (L) 09/02/2024     09/02/2024     (L) 09/02/2024    K 4.4 09/02/2024    BUN 17.9 09/02/2024     CO2 27 09/02/2024    CL 98 09/02/2024      Pertinent diagnostic studies:    Additional labs and diagnostic studies required prior to admission:      QI: GG Care Tool     QI: GG Care Tool  Therapy Evaluation   Swallowing/  Nutritional Status   Diet/Feeding/  Swallowing Setup/ supervision    Eating       Oral Hygiene   Grooming completed oral hygiene standing at sink with Min A. Needed assist for taking toothpaste cap off and applying to brush. Unable to let go of sink with both hands 2/2 BLE buckling with decreased UE support.    Toileting Hygiene       Shower/Bathe   Bathing performed shower t/f with Min A and VC for technique. Slight LOB when stepping over threshold however able to use grab bars for steadying.    Upper Body Dressing   Dressing Upper donned LSO with independence.    Lower Body Dressing   Dressing Lower doffed socks using reacher with Mod I.  Donning LB dress with min assist with AE, mod assist without aD with wifes assist, able to use sock aid mod I     Putting On/Taking Off Footwear       Toilet Transfer   Toileting performed toilet t/f with Min A and use of grab bars.    Bladder Continence Status   Bladder     Bowel Continence Status   Bowel     Roll Left and Right   Bed Mobility Scooting: stand by assistance  Supine to Sit: minimum assistance   Sit to Lying       Lying to Sitting on Side of Bed       Sit to Stand       Chair/Bed-to- Chair   Transfers Sit to Stand:  minimum assistance with rolling walker      Car Transfer       Walk 10 Feet   Equipment      Walk 50 Feet with Two Turns   Balance Standing balance cga    Walk 150 Feet   Endurance    Walk 10 Feet on Uneven Surfaces   Gait pt amb 48ft 2x with RW min-modA. Pt with step-to gait pattern and had 1 episode of buckling 2/2 therapist having him try to take weight off RW with BUE. Seated rest required between trials 2/2 fatigue.     Picking up an Object       Wheel 50 Feet with Two Turns   Wheelchair    Wheel 150 Feet       1 Step (Curb)    Stairs    4 Steps       12 Steps       Expression of Ideas and Wants   Communication Independent    Understanding  Verbal and Non-Verbal Content       Cognitive Patterns   Cognition Independent       Safety Precautions       Lower Extremity      Strength RLE Strength: 4+/5 grossly  LLE Strength: 4+/5 grossly      ROM LLE ROM: WFL  RLE ROM: WFL      Upper Extremity      Strength Right Upper Extremity:   wfl     Left Upper Extremity:  wfl      ROM Right Upper Extremity:   wfl     Left Upper Extremity:  wfl     Care Score Value Definitions  6: Independent. [place] provides no assistance with tasks. A device may or may not have been used.  5: Set-up or clean-up assistance. [place] sets up or cleans up, but does not assist with tasks. [place] may have assisted prior to or following the activity.  4: Supervision or touching assistance. [place] provides verbal cues or touching/steadying or contact guard assistance. Assistance may be provided throughout the activity or intermittently.  3: Partial/moderate assistance. [place] does less than half the effort. [place] lifts, holds, or supports trunk or limbs, but provides less than half the effort.  2: Substantial/maximal assistance. [place] does more than half the effort. [place] lifts or holds trunk or limbs, and provides more than half the effort.  1: Dependent. [place] does all of the effort, or the assistance of two or more helpers is required for the patient to complete the activity.  Care Score Activity Not Attempted Value Definitions  7: Patient refused.  9: Not applicable. Not attempted and the patient did not perform this activity prior to the current illness, exacerbation, or injury.  10: Not attempted due to environmental limitations (e.g., lack of equipment, weather constraints).  88: Not attempted due to medical condition or safety concerns.    DISCHARGE GOALS/ANTICPATED INTERVENTIONS/SERVICES     Expected Level of Improvement for Safe Discharge: Patient/caregivers  anticipate discharge home at or near baseline level of functioning and/or decreased burden of care.    Patient/Family/Caregiver Goals: Patient desires to increase current level of functioning to modified independence for mobility and all ADLs so that he is able to discharge home safely, and eventually return to work.    Required Treatments/Services: Rehabilitation Nursing, Dietitian/nutrition, Social , and Case Management   Required Therapy Therapy Type Min/Day Days/Week Duration of Therapy   Physical Therapy 90 5 10-12 days    Occupational Therapy 90 5 10-12 days    Speech and Language Pathology      Prosthetic/Orthotics      Recreational Therapy      Anticipated Services upon Discharge:  home health with therapy vs outpatient therapy    Additional rehabilitation needs:  none    Expected Discharge Destination:  home with wife and daughter   Barriers to Discharge: None   Discharge Support: Patient has a caregiver available, Discharge plan has been verified with patient's caregiver, and Caregiver is in agreement with the discharge plan   Patient/Family/Caregiver Orientation: Patient/family/caregiver oriented and agreeable to inpatient rehabilitation plan   Estimated Length of Stay:12 days    Projected Admission Date: 9/4/24   Medical Prognosis: good   Physicians Review and Admission Determination:   I have discussed patient with Nurse Liaison. I have reviewed the prescreen. Patient is in need of, appropriate for and should tolerate and benefit from an aggressive IRF stay.

## 2024-09-04 NOTE — PROGRESS NOTES
[facility]  Neurosurgery  Progress Note    Subjective:     Interval History: NAEs overnight. Patient complaints of lumbar soreness. Has been ambulating and working with PT. Kae CDI. Pending placement.     Post-Op Info:  Procedure(s) (LRB):  FUSION, SPINE, LUMBAR, TLIF, USING COMPUTER-ASSISTED NAVIGATION (Left)   16 Days Post-Op      Medications:  Continuous Infusions:  Scheduled Meds:   atorvastatin  40 mg Oral QHS    baclofen  10 mg Oral TID    bisacodyL  10 mg Rectal Daily    carvediloL  12.5 mg Oral BID WM    dexAMETHasone  2 mg Intravenous Q12H    diazePAM  10 mg Oral Q6H    enoxparin  40 mg Subcutaneous Daily    guaiFENesin  600 mg Oral BID    lisinopriL  40 mg Oral Daily    And    hydroCHLOROthiazide  25 mg Oral Daily    OXcarbazepine  150 mg Oral BID    pregabalin  75 mg Oral QHS    QUEtiapine  300 mg Oral Nightly    senna-docusate 8.6-50 mg  2 tablet Oral BID    tamsulosin  1 capsule Oral QAM    venlafaxine  300 mg Oral Daily     PRN Meds:  Current Facility-Administered Medications:     acetaminophen, 650 mg, Oral, Q6H PRN    acetaminophen, 650 mg, Oral, Q4H PRN    aluminum-magnesium hydroxide-simethicone, 30 mL, Oral, Q4H PRN    calcium carbonate, 500 mg, Oral, Daily PRN    cyclobenzaprine, 10 mg, Oral, TID PRN    hydrALAZINE, 10 mg, Intravenous, Q2H PRN    hydrOXYzine HCL, 25 mg, Oral, TID PRN    ondansetron, 4 mg, Oral, Q6H PRN    oxyCODONE-acetaminophen, 1 tablet, Oral, Q4H PRN    prochlorperazine, 10 mg, Intravenous, Q6H PRN     Review of Systems  Objective:     Weight: 104.3 kg (230 lb)  Body mass index is 34.97 kg/m².  Vital Signs (Most Recent):  Temp: 96.7 °F (35.9 °C) (09/04/24 0403)  Pulse: 69 (09/04/24 0450)  Resp: 17 (09/04/24 0403)  BP: 104/66 (09/04/24 0450)  SpO2: (!) 93 % (09/04/24 0403) Vital Signs (24h Range):  Temp:  [96.7 °F (35.9 °C)-99.1 °F (37.3 °C)] 96.7 °F (35.9 °C)  Pulse:  [62-86] 69  Resp:  [16-18] 17  SpO2:  [92 %-97 %] 93 %  BP: ()/(62-82)  "104/66                              Neurosurgery Physical Exam  Awake, alert, oriented. Resting in bed. NAD  Moves all extremities well.  Incision CDI with steri strips overlying.     Significant Labs:  Recent Labs   Lab 09/02/24  0700   *   K 4.4   CL 98   CO2 27   BUN 17.9   CREATININE 0.74   CALCIUM 8.9     Recent Labs   Lab 09/02/24  0700   WBC 13.44*   HGB 11.2*   HCT 34.0*        No results for input(s): "LABPT", "INR", "APTT" in the last 48 hours.  Microbiology Results (last 7 days)       ** No results found for the last 168 hours. **            Assessment/Plan:     Active Diagnoses:      Problems Resolved During this Admission:    Diagnosis Date Noted Date Resolved POA    PRINCIPAL PROBLEM:  Lumbosacral radiculopathy due to degenerative joint disease of spine [M47.27] 08/19/2024 08/28/2024 Yes    Neuritis or radiculitis due to rupture of lumbar intervertebral disc [M51.16] 08/19/2024 08/28/2024 Yes    Other spondylosis with radiculopathy, lumbar region [M47.26] 08/19/2024 08/28/2024 Yes     -MRI cervical done, pending rad read. Reviewed, no need for interventions. Can be transferred to rehab at this time.   -PTOT with LSO brace when OOB  -Dressing changes as needed. Ok to keep open to air. Ok to shower.   -DVT prophylaxis  -Ok for transfer to rehab today. Discharge order in.   -Follow up in office after discharged from rehab.     SMOOTH Mata  Neurosurgery  [facility]    "

## 2024-09-04 NOTE — NURSING
Nurses Note -- 4 Eyes      9/4/2024   7:37 AM      Skin assessed during: Q Shift Change      [] No Altered Skin Integrity Present    []Prevention Measures Documented      [x] Yes- Altered Skin Integrity Present or Discovered   [] LDA Added if Not in Epic (Describe Wound)   [] New Altered Skin Integrity was Present on Admit and Documented in LDA   [] Wound Image Taken    Wound Care Consulted? No    Attending Nurse:  RICHIE Patel     Second RN/Staff Member:   MELODIE Galvez

## 2024-09-04 NOTE — CONSULTS
Dos 9/6/24  I have evaluated the patient on initial IRF admit. I have been involved in rehab prescreen. I have reviewed records.I have reviewed admit orders.  I find the patient appropriate for, in need of and should tolerate an aggressive IRF program with good potential for functional improvement.  I am in agreement with initial Plan of Care  I was involved in the creation of initial consult with Marilyn Cheung MD      Chief Complaint  S/p TLIF L2-S1 decompression and fusion    Reason for Consultation  Physiatry    History of Present Illness  Admit MD: Thor King MD  Consult Physiatry: Devang Cheung MD  HPI:  58 yo WM with a PMH of HTN, HLD, severe lumbar stenosis with lumbosacral radiculopathy and degenerative joint disease of lumbar spine was admitted to [facility] on 8/19/24, and underwent a TLIF L2-S1 decompression and fusion by Dr. August. Drain was placed to site. Patient placed on spinal precautions with LSO brace. On 8/20, PT/OT evals completed with impairments noted of weakness, impaired balance, orthopedic precautions, pain, impaired self care skills, impaired endurance, and impairedfunctional mobility. Labs showed low RBC of 3.09, low H&H of 9.8 & 27.6, low potassium of 3.0, elevated glucose of 118, low calcium of 7.8. On 8/21, patient complained of right posterior leg pain which worsened with sitting. TRAVIS drain remained in place. Decadron was increased. On 8/22, patient complained of continued leg pain and numbness so CT of lumbar spine was completed which showed postoperative changes without evidence of acute abnormality. Patient had continued buckling of BLE with therapy noted with high risk for falls. On 8/23, TRAVIS drain had 20ml output so drain was removed. Patient reported depression due to current situation so pyanne was consulted. On 8/24, psych was consulted for depression and anxiety with recommendation to continue Effexor XR 300mg daily, continue seroquel 300mg at bedtime, added  Trileptal 150mg BID, added Ambien PRN, and added Hydroxyzine 25mg TID PRN intense anxiety episodes. Patient will also need outpatient followup with therapist. On 8/25, patient fell after using restroom, Dr August was notified of fall, and Xrays ordered. On 8/26, Lumbar spine XR showed Postoperative changes of posterior fusion with intact hardware. Sacrum XR showed No fractures or dislocations are clearly identified, Degenerative changes. Patient complained of back pain rating at 10 on 1-10 scale. Left knee XR showed no acute abnormalities seen. On 8/27, Cervical XR showed Limited exam with some degenerative changes and anterolisthesis as described above but essentially nondiagnostic other imaging modalities and or repeat exam is suggested. On 8/28, Patient BP uncontrolled so hospitialist was consulted for hypertension management. Patient was started on Metoprolol 50mg PO BID. On 8/29, BP remained elevated so increased Lisinopril to 40mg, changed Metoprolol to Carvedilol, and continued HCTZ. Lyrica was also added due to uncontrolled pain, and side effects of previously trialed gabapentin. On 8/30, WBC elevated so ordered Chest XR which was negative for acute findings. D-Dimer was elevated at 0.55, low Na noted at 134. On 9/1, labs showed elevated WBC of 12.40, low H&H of 9.9 & 30.1, low NA of 135, elevated glucose of 126, low calcium of 8.2. On 9/2, MRI of cervical spine was completed with No acute traumatic findings identified; Degenerative listhesis at C4-5 and C6-7. On 9/4, neurosurgeon reviewed MRI with no surgical intervention planned, OK to continue to mobilize, and OK transfer to rehab. Patient is AAOx4.  Participating with therapy. Functional status includes minimal assist needed for transfers with RW, walked 50ft with RW at minimal to moderate assist with episode of knee buckling noted, setup assist for grooming while seated, modified independent for toilet hygiene but required moderate assist for clothing  management, minimal assist for lower body dressing and modified independent for socks with reacher, and setup for eating.Patient was evaluated, accepted, and admitted to inpatient rehab to improve functional status. Transferred to [facility] on 9/4 without incident.     9/5: Seen in patient room, seated edge of bed and eating breakfast. States that the food is good here and his appetite is coming back. Reports sleep not great with discomfort and pain. Admits to back pain radiating down both hips and legs. Numbness and tingling to lower legs. Relays that he does not tolerate gabapentin as it makes his body twitch involuntary. Pleased with his surgeon and care. Denies incontinence but admits to urgency with bowels. States that he has been having a bowel movement daily. He takes stool softeners and oral fiber for hemorrhoids at home. Discussed excess abdominal weight and effects on spine as well as hemorrhoids. Motivated, but nervous about BLE weakness. Therapy to evaluate. Denies depression although he states that his drive declined after his surgery in February. VSSAF with mild HTN noted. IM evaluating.         Review of Systems  Barriers to Discharge:  Social: Completed 9th grade. He has no  history. Prior to 7 months ago, pt. Was working full time offshore. Wife and their daughter manage the home, cook, grocery shop, do laundry, and clean.  Reports that he is  from his wife.  He stated that they split up and had to see their home.  They were living in [place].  He will be going to live with one of his daughters in [place].  If that does not work out, he will move in with his sister in the [place] area. Children: (3).    Medical:  Severe lumbar stenosis with radiculopathy and spondylosis, hypertension, hyperlipidemia, anxiety, depression     Functional:    Prior Level of Fx: Prior to 7 months ago, pt. Was completely independent. Since then, he has been using a cane for mobility. He manages  his own medications and own finances.  He does not have a medic alert.   Residence: Plans to live with his daughter in [place] in a single-story home with 1 threshold step to enter the residence. He has a walk-in shower, no grab bars, no HHS. He does not have an elevated toilet. No grab bars.    DME:  Straight cane and rollator.   Psychiatric: Has a history of bipolar disorder, anxiety, depression, and claustrophobia.      Depression/Anxiety:  denies depression. States that he takes Seroquel every day. Home psych medications reviewed, continued,  and Clonazepam added PRN Anxiety (home med).    OXcarbazepine tablet 150 mg BID  QUEtiapine tablet 300 mg qHS  venlafaxine 24 hr capsule 300 mg qd  clonazePAM tablet 1 mg BID PRN Anxiety  Pain: back pain radiating down hips and legs, numbness/tingling to BLE     acetaminophen tablet 650 mg TID  baclofen tablet 10 mg TID  dexAMETHasone tablet 2 mg q12h  pregabalin capsule 25 mg   pregabalin capsule 75 mg qHS  acetaminophen tablet 650 mg q8h PRN mild pain  oxyCODONE immediate release tablet 5 mg q4h PRN mod pain  oxyCODONE immediate release tablet 10 mg q4h PRN severe pain  Bowels/Bladder: last BM 9/4 per patient     Appetite:  improving. Fairly good intake with breakfast    Sleep: fair-decreased by pain/discomfort      QUEtiapine tablet 300 mg qHS  hydrOXYzine pamoate capsule 50 mg qHS PRN Insomnia          Physical Exam  General: well-developed, well-nourished, bipolar-manic (fidgety, energetic)  Eye: EOMI, clear conjunctiva, eyelids normal  HENT: normocephalic, oropharynx and nasal mucosal surfaces moist  Neck: full range of motion, supple  Respiratory: equal chest rise, no SOB, no audible wheeze  Cardiovascular: regular rate and rhythm, no edema  Gastrointestinal: soft, non-tender, non-distended   Musculoskeletal: BLE weakness  Integumentary: no rashes or skin lesions present  Neurologic: cranial nerves intact, lumbar radiculopathy  *MD performed and documented  physical examination           Labs:   Latest Reference Range & Units 09/05/24 05:12   WBC 4.50 - 11.50 x10(3)/mcL 7.04   RBC 4.70 - 6.10 x10(6)/mcL 3.46 (L)   Hemoglobin 14.0 - 18.0 g/dL 10.3 (L)   Hematocrit 42.0 - 52.0 % 31.1 (L)   MCV 80.0 - 94.0 fL 89.9   MCH 27.0 - 31.0 pg 29.8   MCHC 33.0 - 36.0 g/dL 33.1   RDW 11.5 - 17.0 % 12.5   Platelet Count 130 - 400 x10(3)/mcL 253   MPV 7.4 - 10.4 fL 8.4   Neut % % 71.3   LYMPH % % 18.0   Mono % % 9.5   Eos % % 0.1   Basophil % % 0.0   Immature Granulocytes % 1.1   Neut # 2.1 - 9.2 x10(3)/mcL 5.01   Lymph # 0.6 - 4.6 x10(3)/mcL 1.27   Mono # 0.1 - 1.3 x10(3)/mcL 0.67   Eos # 0 - 0.9 x10(3)/mcL 0.01   Baso # <=0.2 x10(3)/mcL 0.00   Immature Grans (Abs) 0 - 0.04 x10(3)/mcL 0.08 (H)   nRBC % 0.0   Sodium 136 - 145 mmol/L 134 (L)   Potassium 3.5 - 5.1 mmol/L 4.4   Chloride 98 - 107 mmol/L 102   CO2 23 - 31 mmol/L 25   Anion Gap mEq/L 7.0   BUN 8.4 - 25.7 mg/dL 15.7   Creatinine 0.73 - 1.18 mg/dL 0.84   BUN/CREAT RATIO  19   eGFR mL/min/1.73/m2 >60   Glucose 82 - 115 mg/dL 110   Calcium 8.8 - 10.0 mg/dL 8.5 (L)   Phosphorus Level 2.3 - 4.7 mg/dL 3.5   Magnesium  1.60 - 2.60 mg/dL 2.20   ALP 40 - 150 unit/L 118   PROTEIN TOTAL 5.8 - 7.6 gm/dL 5.8   Albumin 3.4 - 4.8 g/dL 3.0 (L)   Albumin/Globulin Ratio 1.1 - 2.0 ratio 1.1   Prealbumin 16.0 - 42.0 mg/dL 35.3   BILIRUBIN TOTAL <=1.5 mg/dL 0.4   AST 5 - 34 unit/L 16   ALT 0 - 55 unit/L 23   Globulin, Total 2.4 - 3.5 gm/dL 2.8   (L): Data is abnormally low  (H): Data is abnormally high                Diagnostics:  CT LUMBAR SPINE WITHOUT CONTRAST   FINDINGS:  There are 4 non-rib-bearing lumbar type vertebral bodies with hypoplastic ribs at L1 and transitional type S1 vertebral body.  There are postoperative changes with laminectomies and posterior spinal fusion hardware extending from L2 through S1.  The hardware is intact.  There is no acute fracture identified.  There is grade 1 retrolisthesis of L2 over L3 with marginal  osteophytes.  The spinal canal has been decompressed.  There is moderate neural foraminal narrowing bilaterally at L5-S1, mild at other levels.  There are postoperative changes in the paraspinal soft tissues with scattered subcutaneous emphysema.  A soft tissue drain is in place.  Impression:  Postoperative changes without evidence of acute abnormality.  Date:                                            08/22/2024  Time:                                           11:11      XR LUMBAR SPINE AP AND LATERAL   Impression:  Postoperative changes of posterior fusion with intact hardware  Date:                                            08/26/2024  Time:                                           06:18      XR SACRUM AND COCCYX   Impression:  No fractures or dislocations are clearly identified.  Degenerative changes.  Postsurgical changes  Date:                                            08/26/2024  Time:                                           06:32      XR KNEE COMP 4 OR MORE VIEWS LEFT   Impression:  No acute abnormalities are seen  Date:                                            08/26/2024  Time:                                           17:10      XR CHEST 1 VIEW   Impression:  No acute findings.  Date:                                            08/30/2024  Time:                                           09:51      MRI CERVICAL SPINE WITHOUT CONTRAST   Impression:  No acute traumatic findings identified.  Degenerative listhesis at C4-5 and C6-7.  Date:                                            09/04/2024  Time:                                           09:16              Assessment/Plan  Hospital   Neuritis or radiculitis due to rupture of lumbar intervertebral disc   Other spondylosis with radiculopathy, lumbar region   Lumbosacral radiculopathy due to degenerative joint disease of spine   Radiculopathy of sacral region   S/P lumbar spinal fusion     Non-Hospital   Chronic midline low back pain without sciatica    Benign prostatic hyperplasia with nocturia   Claudication   Cluster B personality disorder   External hemorrhoid, bleeding   Former smoker, stopped smoking many years ago   ADALI (generalized anxiety disorder)   History of bipolar disorder   History of colon polyps   Essential hypertension   Hyperlipidemia   IFG (impaired fasting glucose)   Palpitations   Polyarthralgia   Prediabetes   Tobacco use disorder, moderate, in sustained remission       Wounds: back [id number]/I  S/p TLIF L2-S1 decompression and fusion on 8/19, by Dr. August. Drain was placed to site  Precautions: spinal  Bracing: LSO  Swallowing: Regular Diet (Low Sodium)  Function: Tolerating therapy. Continue PT/OT  VTE Prophylaxis:   enoxaparin injection 40 mg SubQ q24h  Code Status: FULL CODE   Discharge: Plans to live with his daughter in [place] in a single-story home with 1 threshold step to enter the residence. Completed 9th grade. He has no  history. Prior to 7 months ago, pt. Was working full time offshore. Wife and their daughter manage the home, cook, grocery shop, do laundry, and clean.  Reports that he is  from his wife.  He stated that they split up and had to sell their home.  They were living in [place].  He will be going to live with one of his daughters in [place].  If that does not work out, he will move in with his sister in the [place] area. Children: (3). Date pending.                Kelsey Gonsalez NP, conducted additional independent physical examination and assisted with medical documentation.

## 2024-09-04 NOTE — H&P
[facility] ([facility])  Rehab Admission H&P    Patient Name: Lionel Nix  MRN: [id number]  Age: 59 y.o. Sex: male  : 1965  Hospital Length of Stay: 0 days  Date of Service: 2024   Chief Complaint: Severe lumbar stenosis with radiculopathy and spondylosis s/p TLIF L2-S1 decompression and L3-S1 fusion on     Subjective:   History of Present Illness   59-year-old male presented to [facility] on  for scheduled lumbar decompression and fusion. PMH significant for hypertension, hyperlipidemia, anxiety, depression.  On  tolerated TLIF L2-S1 decompression and L3-S1 fusion without perioperative complications.  On  discontinued TRAVIS drain.  Postop CT scan with good placement of hardware.  Psychiatry evaluated on .  Continued Effexor, Seroquel, and initiated Trileptal.  Continued with postoperative pain.  Lyrica initiated along with increase in hypertensive medications.  Reported fall on .  MRI cervical spine with no acute traumatic findings identified and degenerative listhesis at C4-5 and C6-7.  No neurosurgical interventions planned for C-spine. Tolerated transfer to [facility] inpatient rehab unit on  without incident.    Tolerated transfer.  Walking comfortably with therapy in room.  Reports good sleep and appetite.  Last BM .  Vital signs at goal with no recent recorded fevers.  Most recent labs and imaging reviewed and documented below.  Labs today reviewed, H&H stable, hyponatremia trending up with improvement, albumin 3, pre-albumin 35.3, otherwise unremarkable.    Past Medical History: Severe lumbar stenosis with radiculopathy and spondylosis, hypertension, hyperlipidemia, anxiety, depression  Procedure history: TLIF L2-S1 decompression and L3-S1 fusion on   Family History:  Unable to obtain  Social History: Completed 9th grade.  Previously working full-time offshore, stopped working 2024.  Three children.   but recently .   Wife and daughter manage the home, cooks, grocery shop, do laundry, clean.    (-) TOB.     (-) ETOH.   (-) Illicit drug use.   Prior level of function:  7 months prior was completely independent.  Beginning March 2024 has used cane for mobility.  Manages own medications and own finances.  Does not have medical alert.  Residence:  Lives with daughter in [place] in a single-story home with 1 threshold step to enter the residence.  Has a walk-in shower, no grab bars, no HHS.  Does not have an elevated toilet.  No grab bars.  DME:  Straight cane and Rollator  Anticipated discharge destination:  Home with home health    Review of patient's allergies indicates:   Allergen Reactions    Gabapentin Anxiety and Palpitations        Current Facility-Administered Medications:     acetaminophen tablet 650 mg, 650 mg, Oral, Q6H PRN, Yudelka, Carter A, FNP    atorvastatin tablet 40 mg, 40 mg, Oral, QHS, Yudelka, Carter A, FNP, 40 mg at 09/04/24 2006    baclofen tablet 10 mg, 10 mg, Oral, TID, Edwin Gacriaen B, FNP, 10 mg at 09/05/24 0508    benzonatate capsule 100 mg, 100 mg, Oral, TID PRN, Yudelka, Carter A, FNP    carvediloL tablet 12.5 mg, 12.5 mg, Oral, BID, , Alivia B, FNP, 12.5 mg at 09/05/24 0746    cyclobenzaprine tablet 10 mg, 10 mg, Oral, TID PRN, Yudelka, Carter A, FNP    dexAMETHasone tablet 2 mg, 2 mg, Oral, Q12H, Edwin Garciaen B, FNP, 2 mg at 09/05/24 0745    diazePAM tablet 10 mg, 10 mg, Oral, Q6H PRN, Yudelka, Carter A, FNP    docusate sodium capsule 100 mg, 100 mg, Oral, BID, Yudelka, Carter A, FNP, 100 mg at 09/05/24 0745    enoxaparin injection 40 mg, 40 mg, Subcutaneous, Q24H (prophylaxis, 1700), Yudelka, Carter A, FNP, 40 mg at 09/04/24 1649    hydrALAZINE injection 10 mg, 10 mg, Intravenous, Q4H PRN, Carter Jha, MIGEL    hydroCHLOROthiazide tablet 25 mg, 25 mg, Oral, Daily, Alivia Garcia, MIGEL, 25 mg at 09/05/24 0745    HYDROcodone-acetaminophen 5-325 mg per  "tablet 1 tablet, 1 tablet, Oral, Daily PRN, Yudelka, Carter A, FNP    hydrOXYzine pamoate capsule 50 mg, 50 mg, Oral, Nightly PRN, Yudelka, Carter A, FNP    labetalol 20 mg/4 mL (5 mg/mL) IV syring, 10 mg, Intravenous, Q4H PRN, Yudelka, Carter A, FNP    lisinopriL tablet 40 mg, 40 mg, Oral, Daily, Alivia Garcia, FNP, 40 mg at 09/05/24 0745    metoprolol injection 10 mg, 10 mg, Intravenous, Q2H PRN, Yudelka, Carter A, FNP    nitroGLYCERIN SL tablet 0.4 mg, 0.4 mg, Sublingual, Q5 Min PRN, Yudelka Carter A, FNP    ondansetron disintegrating tablet 8 mg, 8 mg, Oral, Q8H PRN, Yudelka, Carter A, FNP    ondansetron injection 4 mg, 4 mg, Intravenous, Q8H PRN, Yudelka, Carter A, FNP    OXcarbazepine tablet 150 mg, 150 mg, Oral, BID, Yudelka, Carter A, FNP, 150 mg at 09/05/24 0746    oxyCODONE-acetaminophen  mg per tablet 1 tablet, 1 tablet, Oral, Q4H PRN, Yudelka, Carter A, FNP, 1 tablet at 09/05/24 0746    polyethylene glycol packet 17 g, 17 g, Oral, BID PRN, Yudelka, Carter A, FNP    pregabalin capsule 75 mg, 75 mg, Oral, QHS, Alviia Garcia, FNP, 75 mg at 09/04/24 2006    QUEtiapine tablet 300 mg, 300 mg, Oral, Nightly, Yudelka, Carter A, FNP, 300 mg at 09/04/24 2005    tamsulosin 24 hr capsule 0.4 mg, 0.4 mg, Oral, QHS, Yudelka, Carter A, FNP, 0.4 mg at 09/04/24 2006    venlafaxine 24 hr capsule 300 mg, 300 mg, Oral, Daily, Yudelka, Carter A, FNP, 300 mg at 09/05/24 0745     Review of Systems   Complete 12-point review of symptoms negative except for what's mentioned in HPI     Objective:     BP (!) 140/85   Pulse 76   Temp 97.6 °F (36.4 °C) (Oral)   Resp (!) 24   Ht 5' 8" (1.727 m)   Wt 107.4 kg (236 lb 12.8 oz)   SpO2 96%   BMI 36.01 kg/m²     Physical Exam  Vitals reviewed.   Eyes:      Pupils: Pupils are equal, round, and reactive to light.   Cardiovascular:      Rate and Rhythm: Normal rate and regular rhythm.      Heart sounds: Normal heart sounds. "   Pulmonary:      Effort: Pulmonary effort is normal.      Breath sounds: Normal breath sounds.   Abdominal:      General: Bowel sounds are normal.   Musculoskeletal:         General: Normal range of motion.   Skin:     General: Skin is warm.      Comments: Lumbar surgical incision dry and intact   Neurological:      General: No focal deficit present.      Mental Status: He is alert and oriented to person, place, and time.      Motor: Weakness present.   Psychiatric:         Mood and Affect: Mood normal.     *MD performed and documented physical examination       Lines/Drains/Airways       Peripheral Intravenous Line  Duration                  Peripheral IV - Single Lumen 08/24/24 1717 20 G No Anterior;Right Forearm 10 days                    Labs  Recent Results (from the past 24 hour(s))   Comprehensive metabolic panel    Collection Time: 09/05/24  5:12 AM   Result Value Ref Range    Sodium 134 (L) 136 - 145 mmol/L    Potassium 4.4 3.5 - 5.1 mmol/L    Chloride 102 98 - 107 mmol/L    CO2 25 23 - 31 mmol/L    Glucose 110 82 - 115 mg/dL    Blood Urea Nitrogen 15.7 8.4 - 25.7 mg/dL    Creatinine 0.84 0.73 - 1.18 mg/dL    Calcium 8.5 (L) 8.8 - 10.0 mg/dL    Protein Total 5.8 5.8 - 7.6 gm/dL    Albumin 3.0 (L) 3.4 - 4.8 g/dL    Globulin 2.8 2.4 - 3.5 gm/dL    Albumin/Globulin Ratio 1.1 1.1 - 2.0 ratio    Bilirubin Total 0.4 <=1.5 mg/dL     40 - 150 unit/L    ALT 23 0 - 55 unit/L    AST 16 5 - 34 unit/L    eGFR >60 mL/min/1.73/m2    Anion Gap 7.0 mEq/L    BUN/Creatinine Ratio 19    Magnesium    Collection Time: 09/05/24  5:12 AM   Result Value Ref Range    Magnesium Level 2.20 1.60 - 2.60 mg/dL   Prealbumin    Collection Time: 09/05/24  5:12 AM   Result Value Ref Range    Prealbumin 35.3 16.0 - 42.0 mg/dL   Phosphorus    Collection Time: 09/05/24  5:12 AM   Result Value Ref Range    Phosphorus Level 3.5 2.3 - 4.7 mg/dL   Iron and TIBC    Collection Time: 09/05/24  5:12 AM   Result Value Ref Range    Iron Binding  Capacity Unsaturated 236 69 - 240 ug/dL    Iron Level 36 (L) 65 - 175 ug/dL    Transferrin 247 163 - 344 mg/dL    Iron Binding Capacity Total 272 250 - 450 ug/dL    Iron Saturation 13 (L) 20 - 50 %   CBC with Differential    Collection Time: 09/05/24  5:12 AM   Result Value Ref Range    WBC 7.04 4.50 - 11.50 x10(3)/mcL    RBC 3.46 (L) 4.70 - 6.10 x10(6)/mcL    Hgb 10.3 (L) 14.0 - 18.0 g/dL    Hct 31.1 (L) 42.0 - 52.0 %    MCV 89.9 80.0 - 94.0 fL    MCH 29.8 27.0 - 31.0 pg    MCHC 33.1 33.0 - 36.0 g/dL    RDW 12.5 11.5 - 17.0 %    Platelet 253 130 - 400 x10(3)/mcL    MPV 8.4 7.4 - 10.4 fL    Neut % 71.3 %    Lymph % 18.0 %    Mono % 9.5 %    Eos % 0.1 %    Basophil % 0.0 %    Lymph # 1.27 0.6 - 4.6 x10(3)/mcL    Neut # 5.01 2.1 - 9.2 x10(3)/mcL    Mono # 0.67 0.1 - 1.3 x10(3)/mcL    Eos # 0.01 0 - 0.9 x10(3)/mcL    Baso # 0.00 <=0.2 x10(3)/mcL    IG# 0.08 (H) 0 - 0.04 x10(3)/mcL    IG% 1.1 %    NRBC% 0.0 %     Radiology  MRI cervical spine 9/4: Impression: No acute traumatic findings identified. Degenerative listhesis at C4-5 and C6-7.   Radiology  X-ray lumbar spine 8/26: Impression: Postoperative changes of posterior fusion with intact hardware   Assessment/Plan:     59 y.o. WM admitted on 9/4/2024    Severe lumbar stenosis with radiculopathy and spondylosis   - s/p TLIF L2-S1 decompression and L3-S1 fusion on 08/19  - LSO brace when OOB, spinal precautions  - continue     Dexamethasone 2 mg b.i.d. (continue taper)     Lyrica 75 mg at night      Baclofen 10 mg t.i.d.   - follow-up with Neurosurgery outpatient    Bipolar disorder  - mood stable  - continue     Venlafaxine 300 mg daily     Oxcarbazepine 150 mg b.i.d.      Seroquel 300 mg at night     HLD  - FLP outpatient  - continue   Atorvastatin 40 mg at night     HTN  - blood pressure at goal  - continue   Coreg 12.5 mg b.i.d.      Hydrochlorothiazide 25 mg daily      Lisinopril 40 mg daily    Hydralazine 10 mg every 2 hours as needed for BP > 160/90   Labetalol  10 mg every 2 hours as needed for BP > 160/90  - low sodium diet    Constipation  - stable  - continue     Colace 100 mg b.i.d.     BPH  - stable  - continue     Tamsulosin 0.4 mg at night    MEDICAL PLAN:  - Admit to [facility]  - Medical reconciliation completed and included in the electronic health record  - Draw admit labs including CBC, CMP, and Prealbumin  - Maintain weightbearing status as ordered  - Monitor postoperative surgical incision changing dressing daily  - Teach skin protection and wound prevention techniques  - Initiate fall precaution  - Initiate bowel training program  - Initiate bladder training as indicated  - Pain management  - IS q2 hours while awake    THERAPEUTIC PLAN:  - Physical therapy 60-90 minutes 5 to week to increase functional mobility, maintain weightbearing precautions, increase lower extremity restrengthening, increase overall activity tolerance, teach balance and safety measures as well as fall precautions, make equipment and orthotic recommendations, be involved in family training and discharge planning, monitor pain levels and vital signs during therapy adjusting treatment accordingly  - Occupational therapy 60-90 minutes 5 times a week to increase functional independence with activities of daily living, maintain weightbearing precautions, teach use of adaptive equipment, increase upper extremity restrengthening, increase overall activity tolerance, teach balance and safety measures as well as fall precautions, make equipment and orthotic recommendations, be involved in family training and discharge planning, monitor pain levels and vital signs during therapy adjusting treatment accordingly  - Speech and language pathology will do an in-depth evaluation of patient's cognition, phonation, and swallowing. They will initiate therapy 30- 60 minutes 5 times a week as indicated  - Recreational therapy will incorporate all patient's  functional abilities  into recreational activities that will require visual, spatial, and perceptual abilities; gross and fine motor coordination skills; the cognition to follow multistep commands; dynamic and static balance and reaching abilities. They will also incorporate animal assisted therapy into their weekly program  - Rehabilitation nursing will act as patient family physician liaison. They will integrate patient's functional abilities, after discussions with therapist, into everyday activities with the CNA's,  LPN's and RNs that will include but are not limited to dressing, bathing, feeding, grooming, toileting, transfers, hygiene etc. They will administer medications watching for wanted as well as unwanted effects. They will initiate DVT/VTE prophylaxis as ordered. Will monitor vital signs, lab values, and pain levels, making appropriate physician notification. They will monitor skin integrity including postop incision, making daily dressing changes. They will teach skin protection and wound prevention techniques. They will initiate bowel training They will initiate bladder training as indicated. They will initiate fall precautions  - Rehabilitation counseling/case management will act as patient and family liaison. They will set up family conferences, family training, aid in discharge planning, and aid in the procurement of assistive devices  - Patient will have 24 hour availability to physiatric care  - Patient will have nutritional services involved, monitoring oral input and visceral protein stores. They will make dietary and supplement recommendations and follow-up as necessary  - Patient will have weekly interdisciplinary team conferences  - Patient will have initial family conference and follow up conferences as indicated  - Patient will have family training prior to discharge     Estimated length of stay - 14-17 days  Anticipated discharge destination - Home with   Medical status - stabilizing  postoperatively; will need to monitor pain levels, postoperative skin integrity, watch for evidence of deep vein thrombosis, monitor postoperative H&H, monitor vital signs closely.  Medical prognosis - Good for post-op healing and functional improvement  Previous functional Status: Beginning March 2024 has used cane for mobility.  Manages own medications and own finances.  Present Functional Status:  Min to mod assist    VTE Prophylaxis:  Lovenox 40 mg daily    POA: no  Living will: no  Contacts: Clemencia Nix (daughter) [phone]     Dee Dee (sister) [phone]    CODE STATUS: Full  Internal Medicine (attending): Thor King MD  Physiatry (consulting):  Devang Cheung MD    OUTPATIENT PROVIDERS  PCP: Rafy Poole MD   Neurosurgery:  Car Augsut MD   Psychiatry: Josef Sutton MD in [place], [place]    DISPOSITION: Condition stable. Tolerated transfer.  Recent labs and imaging reviewed.  Rehab admission orders initiated.  Med reconciliation completed.  Consult physiatry for rehab and pain management.  PT/OT/RT/ST to eval and treat.  Vital signs at goal.  Bowel management, sleep hygiene, and appetite at goal.  Labs reviewed.  H&H stable.  Hyponatremia trending up with improvement.  Albumin 3, pre-albumin 35.3.    PHYSICIAN ATTESTATION: I have been involved in the patient's rehabilitation prescreening process and I have now completed the post admission physician evaluation. Patient is in need of, appropriate for, and should tolerate the above outlined inpatient rehabilitation plan. I believe this is necessary to reach maximal postoperative healing and maximal functional abilities. I do not believe this would be possible in a timely fashion in a less intensive setting      Alivia Garcia NP conducted independent physical examination and assisted with medical documentation.    Total time spent on this encounter including chart review and direct MD + NP 1-on-1 patient interaction: 101 minutes   Over 50% of  this time was spent in counseling and coordination of care

## 2024-09-04 NOTE — PLAN OF CARE
Lionel Nix age: 59 *LSO, spinal precautions         Dx: Severe lumbar stenosis with radiculopathy and spondylosis s/p TLIF L2-S1 decompression and fusion 8/19/2024. Pt. Has a past medical history of HTN, HLD, severe lumbar stenosis with lumbosacral radiculopathy and degenerative joint disease of the lumbar spine. *See chart for full and complete medical history*          Hx mental health/substance abuse: Pt. Has a history of bipolar disorder, anxiety, depression, and claustrophobia.       Insurance:  [facility] out of state        Is there evidence of an acute change in mental status from the patients baseline? No      Education//Work Hx: Pt. Completed 9th grade. He has no  history. Prior to 7 months ago, pt. Was working full time offshore.      Pt. Is  to Khloe Nix ([phone]). Khloe and their daughter manage the home, cook, grocery shop, do laundry, and clean. Pt. Reports that he is  from [place].  He stated that they split up and had to see their home.  They were living in [place].  He will be going to live with one of his daughters in [place].  If that does not work out, he will move in with his sister in the [facility].      Children: (3) /Friends/Family: 1) Clemencia Nix, daughter ([phone]) 2) Dee Dee, sister ([phone])       Power of  (no) Living Will (no)        Prior Level of Fx: Prior to 7 months ago, pt. Was completely independent. Since then, he has been using a cane for mobility. He manages his own medications and own finances.  He does not have a medic alert.      Residence: Pt. plans to live with his daughter in [place] in a single-story home with 1 threshold step to enter the residence. He has a walk-in shower, no grab bars, no HHS. He does not have an elevated toilet. No grab bars.       DME:  Straight cane and rollator. Will use DME company approved per insurance.       HH/OP tx: Denies HH history. HH set up while on acute with  [facility].       Pharmacy: [facility] in [place] / (has prescription drug coverage)          MD(s): PCP: Dr. Rafy Poole in [place]; Neurosurgery: Dr. Car August ([phone]) Appointment 9/17/2024 at 12:15 PM; Psychiatrist: Dr. Sutton in [place], [place]

## 2024-09-04 NOTE — PROGRESS NOTES
MRI C spine resulted:  No acute traumatic findings identified.  Degenerative listhesis at C4-5 and C6-7.     No surgical intervention planned for C spine  Ok to continue to mobilize without brace  OK to transfer to rehab today

## 2024-09-04 NOTE — PLAN OF CARE
F/u with Tere in admissions at  [facility] to update on POC. MRI still pending rad read, but it was reviewed by nsgy and they are clearing pt to d/c to rehab. Tere stated she would contact [employer] to change admit date and notify me once he is cleared to admit.     Christel Tubbs LCSW

## 2024-09-04 NOTE — PLAN OF CARE
09/04/24 1104   Final Note   Assessment Type Final Discharge Note   Anticipated Discharge Disposition Rehab   Hospital Resources/Appts/Education Provided Post-Acute resouces added to AVS   Post-Acute Status   Post-Acute Authorization Placement   Post-Acute Placement Status Set-up Complete/Auth obtained   Discharge Delays None known at this time     Pt to d/c to [facility]. Pickup with Breonna at 1:30pm. Updated pt at bedside. Notified nurse and .    Christel Tubbs LCSW

## 2024-09-04 NOTE — NURSING
Nurses Note -- 4 Eyes      9/4/2024   3:45 PM      Skin assessed during: Admit      [] No Altered Skin Integrity Present    []Prevention Measures Documented      [x] Yes- Altered Skin Integrity Present or Discovered   [] LDA Added if Not in Epic (Describe Wound)   [x] New Altered Skin Integrity was Present on Admit and Documented in LDA   [x] Wound Image Taken    Wound Care Consulted? Yes    Attending Nurse:  Evangelina Santoro RN/Staff Member:  Shanell Bearden RN

## 2024-09-05 LAB
ALBUMIN SERPL-MCNC: 3 G/DL (ref 3.4–4.8)
ALBUMIN/GLOB SERPL: 1.1 RATIO (ref 1.1–2)
ALP SERPL-CCNC: 118 UNIT/L (ref 40–150)
ALT SERPL-CCNC: 23 UNIT/L (ref 0–55)
ANION GAP SERPL CALC-SCNC: 7 MEQ/L
AST SERPL-CCNC: 16 UNIT/L (ref 5–34)
BASOPHILS # BLD AUTO: 0 X10(3)/MCL
BASOPHILS NFR BLD AUTO: 0 %
BILIRUB SERPL-MCNC: 0.4 MG/DL
BUN SERPL-MCNC: 15.7 MG/DL (ref 8.4–25.7)
CALCIUM SERPL-MCNC: 8.5 MG/DL (ref 8.8–10)
CHLORIDE SERPL-SCNC: 102 MMOL/L (ref 98–107)
CO2 SERPL-SCNC: 25 MMOL/L (ref 23–31)
CREAT SERPL-MCNC: 0.84 MG/DL (ref 0.73–1.18)
CREAT/UREA NIT SERPL: 19
EOSINOPHIL # BLD AUTO: 0.01 X10(3)/MCL (ref 0–0.9)
EOSINOPHIL NFR BLD AUTO: 0.1 %
ERYTHROCYTE [DISTWIDTH] IN BLOOD BY AUTOMATED COUNT: 12.5 % (ref 11.5–17)
FERRITIN SERPL-MCNC: 48.77 NG/ML (ref 21.81–274.66)
GFR SERPLBLD CREATININE-BSD FMLA CKD-EPI: >60 ML/MIN/1.73/M2
GLOBULIN SER-MCNC: 2.8 GM/DL (ref 2.4–3.5)
GLUCOSE SERPL-MCNC: 110 MG/DL (ref 82–115)
HCT VFR BLD AUTO: 31.1 % (ref 42–52)
HGB BLD-MCNC: 10.3 G/DL (ref 14–18)
IMM GRANULOCYTES # BLD AUTO: 0.08 X10(3)/MCL (ref 0–0.04)
IMM GRANULOCYTES NFR BLD AUTO: 1.1 %
IRON SATN MFR SERPL: 13 % (ref 20–50)
IRON SERPL-MCNC: 36 UG/DL (ref 65–175)
LYMPHOCYTES # BLD AUTO: 1.27 X10(3)/MCL (ref 0.6–4.6)
LYMPHOCYTES NFR BLD AUTO: 18 %
MAGNESIUM SERPL-MCNC: 2.2 MG/DL (ref 1.6–2.6)
MCH RBC QN AUTO: 29.8 PG (ref 27–31)
MCHC RBC AUTO-ENTMCNC: 33.1 G/DL (ref 33–36)
MCV RBC AUTO: 89.9 FL (ref 80–94)
MONOCYTES # BLD AUTO: 0.67 X10(3)/MCL (ref 0.1–1.3)
MONOCYTES NFR BLD AUTO: 9.5 %
NEUTROPHILS # BLD AUTO: 5.01 X10(3)/MCL (ref 2.1–9.2)
NEUTROPHILS NFR BLD AUTO: 71.3 %
NRBC BLD AUTO-RTO: 0 %
PHOSPHATE SERPL-MCNC: 3.5 MG/DL (ref 2.3–4.7)
PLATELET # BLD AUTO: 253 X10(3)/MCL (ref 130–400)
PMV BLD AUTO: 8.4 FL (ref 7.4–10.4)
POTASSIUM SERPL-SCNC: 4.4 MMOL/L (ref 3.5–5.1)
PREALB SERPL-MCNC: 35.3 MG/DL (ref 16–42)
PROT SERPL-MCNC: 5.8 GM/DL (ref 5.8–7.6)
RBC # BLD AUTO: 3.46 X10(6)/MCL (ref 4.7–6.1)
SODIUM SERPL-SCNC: 134 MMOL/L (ref 136–145)
TIBC SERPL-MCNC: 236 UG/DL (ref 69–240)
TIBC SERPL-MCNC: 272 UG/DL (ref 250–450)
TRANSFERRIN SERPL-MCNC: 247 MG/DL (ref 163–344)
WBC # BLD AUTO: 7.04 X10(3)/MCL (ref 4.5–11.5)

## 2024-09-05 PROCEDURE — 97162 PT EVAL MOD COMPLEX 30 MIN: CPT

## 2024-09-05 PROCEDURE — 82728 ASSAY OF FERRITIN: CPT | Performed by: NURSE PRACTITIONER

## 2024-09-05 PROCEDURE — 97535 SELF CARE MNGMENT TRAINING: CPT

## 2024-09-05 PROCEDURE — 97530 THERAPEUTIC ACTIVITIES: CPT

## 2024-09-05 PROCEDURE — 99900031 HC PATIENT EDUCATION (STAT)

## 2024-09-05 PROCEDURE — 25000003 PHARM REV CODE 250: Performed by: NURSE PRACTITIONER

## 2024-09-05 PROCEDURE — 63600175 PHARM REV CODE 636 W HCPCS: Performed by: NURSE PRACTITIONER

## 2024-09-05 PROCEDURE — 80053 COMPREHEN METABOLIC PANEL: CPT | Performed by: NURSE PRACTITIONER

## 2024-09-05 PROCEDURE — 97116 GAIT TRAINING THERAPY: CPT

## 2024-09-05 PROCEDURE — 36415 COLL VENOUS BLD VENIPUNCTURE: CPT | Performed by: NURSE PRACTITIONER

## 2024-09-05 PROCEDURE — 85025 COMPLETE CBC W/AUTO DIFF WBC: CPT | Performed by: NURSE PRACTITIONER

## 2024-09-05 PROCEDURE — 84100 ASSAY OF PHOSPHORUS: CPT | Performed by: NURSE PRACTITIONER

## 2024-09-05 PROCEDURE — 83540 ASSAY OF IRON: CPT | Performed by: NURSE PRACTITIONER

## 2024-09-05 PROCEDURE — 83550 IRON BINDING TEST: CPT | Performed by: NURSE PRACTITIONER

## 2024-09-05 PROCEDURE — 94799 UNLISTED PULMONARY SVC/PX: CPT

## 2024-09-05 PROCEDURE — 97166 OT EVAL MOD COMPLEX 45 MIN: CPT

## 2024-09-05 PROCEDURE — 84134 ASSAY OF PREALBUMIN: CPT | Performed by: NURSE PRACTITIONER

## 2024-09-05 PROCEDURE — 97110 THERAPEUTIC EXERCISES: CPT

## 2024-09-05 PROCEDURE — 11800000 HC REHAB PRIVATE ROOM

## 2024-09-05 PROCEDURE — 94761 N-INVAS EAR/PLS OXIMETRY MLT: CPT

## 2024-09-05 PROCEDURE — 83735 ASSAY OF MAGNESIUM: CPT | Performed by: NURSE PRACTITIONER

## 2024-09-05 RX ORDER — CLONAZEPAM 1 MG/1
1 TABLET ORAL 2 TIMES DAILY PRN
Status: DISCONTINUED | OUTPATIENT
Start: 2024-09-05 | End: 2024-09-09

## 2024-09-05 RX ORDER — PREGABALIN 25 MG/1
25 CAPSULE ORAL
Status: DISCONTINUED | OUTPATIENT
Start: 2024-09-05 | End: 2024-09-07

## 2024-09-05 RX ORDER — OXYCODONE HYDROCHLORIDE 5 MG/1
10 TABLET ORAL EVERY 4 HOURS PRN
Status: DISCONTINUED | OUTPATIENT
Start: 2024-09-05 | End: 2024-09-13 | Stop reason: HOSPADM

## 2024-09-05 RX ORDER — OXYCODONE HYDROCHLORIDE 5 MG/1
5 TABLET ORAL EVERY 4 HOURS PRN
Status: DISCONTINUED | OUTPATIENT
Start: 2024-09-05 | End: 2024-09-13 | Stop reason: HOSPADM

## 2024-09-05 RX ORDER — ACETAMINOPHEN 325 MG/1
650 TABLET ORAL 3 TIMES DAILY
Status: DISCONTINUED | OUTPATIENT
Start: 2024-09-05 | End: 2024-09-13 | Stop reason: HOSPADM

## 2024-09-05 RX ORDER — ACETAMINOPHEN 325 MG/1
650 TABLET ORAL EVERY 8 HOURS PRN
Status: DISCONTINUED | OUTPATIENT
Start: 2024-09-05 | End: 2024-09-13 | Stop reason: HOSPADM

## 2024-09-05 RX ADMIN — PREGABALIN 75 MG: 75 CAPSULE ORAL at 08:09

## 2024-09-05 RX ADMIN — HYDROCHLOROTHIAZIDE 25 MG: 25 TABLET ORAL at 07:09

## 2024-09-05 RX ADMIN — BACLOFEN 10 MG: 5 TABLET ORAL at 12:09

## 2024-09-05 RX ADMIN — VENLAFAXINE HYDROCHLORIDE 300 MG: 150 CAPSULE, EXTENDED RELEASE ORAL at 07:09

## 2024-09-05 RX ADMIN — OXCARBAZEPINE 150 MG: 150 TABLET, FILM COATED ORAL at 07:09

## 2024-09-05 RX ADMIN — BACLOFEN 10 MG: 5 TABLET ORAL at 05:09

## 2024-09-05 RX ADMIN — DOCUSATE SODIUM 100 MG: 100 CAPSULE, LIQUID FILLED ORAL at 07:09

## 2024-09-05 RX ADMIN — Medication 1 CAPSULE: at 12:09

## 2024-09-05 RX ADMIN — LISINOPRIL 40 MG: 10 TABLET ORAL at 07:09

## 2024-09-05 RX ADMIN — OXCARBAZEPINE 150 MG: 150 TABLET, FILM COATED ORAL at 08:09

## 2024-09-05 RX ADMIN — TAMSULOSIN HYDROCHLORIDE 0.4 MG: 0.4 CAPSULE ORAL at 08:09

## 2024-09-05 RX ADMIN — CARVEDILOL 12.5 MG: 6.25 TABLET, FILM COATED ORAL at 07:09

## 2024-09-05 RX ADMIN — DEXAMETHASONE 2 MG: 2 TABLET ORAL at 07:09

## 2024-09-05 RX ADMIN — ATORVASTATIN CALCIUM 40 MG: 40 TABLET, FILM COATED ORAL at 08:09

## 2024-09-05 RX ADMIN — OXYCODONE HYDROCHLORIDE 10 MG: 5 TABLET ORAL at 04:09

## 2024-09-05 RX ADMIN — OXYCODONE HYDROCHLORIDE 10 MG: 5 TABLET ORAL at 08:09

## 2024-09-05 RX ADMIN — DOCUSATE SODIUM 100 MG: 100 CAPSULE, LIQUID FILLED ORAL at 08:09

## 2024-09-05 RX ADMIN — OXYCODONE HYDROCHLORIDE AND ACETAMINOPHEN 1 TABLET: 10; 325 TABLET ORAL at 07:09

## 2024-09-05 RX ADMIN — DEXAMETHASONE 2 MG: 2 TABLET ORAL at 08:09

## 2024-09-05 RX ADMIN — ENOXAPARIN SODIUM 40 MG: 40 INJECTION SUBCUTANEOUS at 04:09

## 2024-09-05 RX ADMIN — ACETAMINOPHEN 650 MG: 325 TABLET ORAL at 08:09

## 2024-09-05 RX ADMIN — QUETIAPINE 300 MG: 100 TABLET ORAL at 08:09

## 2024-09-05 RX ADMIN — ACETAMINOPHEN 650 MG: 325 TABLET ORAL at 12:09

## 2024-09-05 RX ADMIN — BACLOFEN 10 MG: 5 TABLET ORAL at 08:09

## 2024-09-05 RX ADMIN — CARVEDILOL 12.5 MG: 6.25 TABLET, FILM COATED ORAL at 08:09

## 2024-09-05 RX ADMIN — PREGABALIN 25 MG: 25 CAPSULE ORAL at 12:09

## 2024-09-05 NOTE — PROGRESS NOTES
"   09/05/24 1000   Rec Therapy Time Calculation   Date of Treatment 09/06/24   Rec Start Time 1000   Rec Stop Time 1030   Rec Total Time (min) 30 min   Time   Treatment time 2 units   Charges   $Therapeutic Exercise 2 units   Precautions   General Precautions fall   Orthopedic Precautions  spinal precautions   Braces LSO   Pain/Comfort   Pain Rating 1 9/10   Location - Side 1 Bilateral   Location - Orientation 1 upper   Location 1 back   Pain Addressed 1 Reposition   OTHER   Treatment Diagnosis Severe lumbar stenosis with radiculopathy and spondylosis, TLIF L2-S1 decompression and fusion, HTN, HLD, lumbosacral raadiculopathy and degenerative joint disease of the lumbar spine   Rehab identified problem list/impairments weakness;impaired endurance;impaired functional mobility;gait instability;impaired balance;impaired cognition;decreased coordination;decreased upper extremity function;decreased lower extremity function;decreased safety awareness;pain;decreased ROM;impaired coordination   Values/Beliefs/Spiritual Care   Spiritual, Cultural Beliefs, Christian Practices, Values that Affect Care no   Prior Living/ADLs   Admit Date 09/05/24   Living Arrangements house   Patient responsibilites: Caregiver to pet;Community mobility;;Financial management;Leisure/play/hobbies;Employed   Number of Children 3   Occupation Offshore Work   Previous Hand Domiance Right   Current Hand Dominance Right   Overall Level of Functioning   Activity Tolerance Mod Indep   Dynamic Sitting Balance/Reaching Mod Indep   Dynamic Standing Balance/Reaching Min A   Right UE Coodination/Dexterity Standby Assist   Left UE Coordination/Dexterity Standby Assist   Problem Solving/Sequencing Skills Min A   Memory Recall Min A   R/L Neglect/Inattention Does not occur   Attention Span Min A   Social Interaction Mod Indep   Patient Goals   Patient Goal 1 "To be able to walk and go on with life again."   Recreational Therapy Short Term Goals   Short " Term Goal 1 Will increase sit to stand to supervision   Short Term Goal 1 Progression Initiated   Short Term Goal 2 Will improve dynamic standing balance/reaching to supervision   Short Term Goal 2 Progression Initiated   Recreational Therapy Long Term Goals   Long Term Goal 1 Will increase standing tolerance to 5 minutes   Long Term Goal 1 Progression Initiated   Long Term Goal 2 Will improve dynamic standing balance/reaching to setup   Long Term Goal 2 Progression Initiated   Plan   Patient to be seen Daily   Planned Duration 2 weeks   Treatments Planned Balance training;Cognitive training;Coordination;Energy conservation training;Fine motor;Safety education   Treatment plan/goals estblished with Patient/Caregiver Yes

## 2024-09-05 NOTE — CONSULTS
Inpatient Nutrition Assessment    Admit Date: 9/4/2024   Total duration of encounter: 1 day   Patient Age: 59 y.o.    Nutrition Recommendation/Prescription     Continue Regular diet, add Low Sodium modifier.  Monitor wt, labs, and po intake.    Communication of Recommendations: reviewed with patient    Nutrition Assessment     Malnutrition Assessment/Nutrition-Focused Physical Exam    Malnutrition Context: acute illness or injury (09/05/24 1355)  Malnutrition Level:  (Does not meet criteria) (09/05/24 1355)  Energy Intake (Malnutrition):  (Does not meet criteria) (09/05/24 1355)  Weight Loss (Malnutrition):  (Does not meet criteria) (09/05/24 1355)  Subcutaneous Fat (Malnutrition):  (Does not meet criteria) (09/05/24 1355)           Muscle Mass (Malnutrition):  (Does not meet criteria) (09/05/24 1355)                          Fluid Accumulation (Malnutrition):  (Does not meet criteria) (09/05/24 1355)  Hand  Strength, Left (Malnutrition):  (Does not meet criteria) (09/05/24 1355)  Hand  Strength, Right (Malnutrition):  (Does not meet criteria) (09/05/24 1355)  A minimum of two characteristics is recommended for diagnosis of either severe or non-severe malnutrition.    Chart Review    Reason Seen: physician consult for eval and treat    Malnutrition Screening Tool Results   Have you recently lost weight without trying?: No  Have you been eating poorly because of a decreased appetite?: No   MST Score: 0   Diagnosis:  Severe lumbar stenosis with radiculopathy and spondylosis   Bipolar disorder  HLD  HTN  Constipation  BPH    Relevant Medical History:    Anxiety      Chronic right-sided low back pain      Enlarged prostate      Hyperlipidemia      Hypertension      Lumbosacral radiculopathy due to degenerative joint disease of spine      Neuritis or radiculitis due to rupture of lumbar intervertebral disc      Numbness and tingling of both feet      Numbness and tingling of both legs      Obesity      Other  spondylosis with radiculopathy, lumbar region        Scheduled Medications:  acetaminophen, 650 mg, TID  atorvastatin, 40 mg, QHS  baclofen, 10 mg, TID  carvediloL, 12.5 mg, BID  dexAMETHasone, 2 mg, Q12H  docusate sodium, 100 mg, BID  enoxparin, 40 mg, Q24H (prophylaxis, 1700)  hydroCHLOROthiazide, 25 mg, Daily  lisinopriL, 40 mg, Daily  OXcarbazepine, 150 mg, BID  pregabalin, 25 mg, Before breakfast  pregabalin, 75 mg, QHS  QUEtiapine, 300 mg, Nightly  tamsulosin, 0.4 mg, QHS  venlafaxine, 300 mg, Daily  vitamin renal formula (B-complex-vitamin c-folic acid), 1 capsule, Daily    Continuous Infusions:   PRN Medications:  acetaminophen, 650 mg, Q8H PRN  benzonatate, 100 mg, TID PRN  clonazePAM, 1 mg, BID PRN  hydrALAZINE, 10 mg, Q4H PRN  HYDROcodone-acetaminophen, 1 tablet, Daily PRN  hydrOXYzine pamoate, 50 mg, Nightly PRN  labetalol, 10 mg, Q4H PRN  metoprolol, 10 mg, Q2H PRN  nitroGLYCERIN, 0.4 mg, Q5 Min PRN  ondansetron, 8 mg, Q8H PRN  ondansetron, 4 mg, Q8H PRN  oxyCODONE, 10 mg, Q4H PRN  oxyCODONE, 5 mg, Q4H PRN  polyethylene glycol, 17 g, BID PRN    Calorie Containing IV Medications: no significant kcals from medications at this time    Recent Labs   Lab 08/30/24  0506 09/01/24  0742 09/02/24  0700 09/05/24  0512   * 135* 132* 134*   K 3.8 4.6 4.4 4.4   CALCIUM 8.6* 8.2* 8.9 8.5*   PHOS  --   --   --  3.5   MG  --  2.30  --  2.20    102 98 102   CO2 24 26 27 25   BUN 24.2 21.3 17.9 15.7   CREATININE 0.79 0.94 0.74 0.84   EGFRNORACEVR >60 >60 >60 >60   GLUCOSE 134* 126* 102 110   BILITOT  --  0.4 0.4 0.4   ALKPHOS  --  105 127 118   ALT  --  25 28 23   AST  --  21 24 16   ALBUMIN  --  2.9* 3.4 3.0*   PREALB  --   --   --  35.3   WBC 14.06* 12.40* 13.44* 7.04   HGB 10.6* 9.9* 11.2* 10.3*   HCT 31.2* 30.1* 34.0* 31.1*     Nutrition Orders:  Diet Adult Regular      Appetite/Oral Intake: good/% of meals  Factors Affecting Nutritional Intake: constipation  Social Needs Impacting Access to Food:  "none identified  Food/Congregation/Cultural Preferences: none reported  Food Allergies: no known food allergies  Last Bowel Movement: 09/04/24  Wound(s):  No pressure injuries documented    Comments    9/5/24: Pt reports good appetite and intake. Denies issues c/s. Reprots constipation. Continue bowel training program. Last BM today. No n/v. Pt was on Ozempic and was trying to lose weight intentionally but states he gains most of it back.    Anthropometrics    Height: 5' 8" (172.7 cm), Height Method: Stated  Last Weight: 107.4 kg (236 lb 12.8 oz) (09/04/24 1559), Weight Method: Bed Scale  BMI (Calculated): 36  BMI Classification: obese grade II (BMI 35-39.9)        Ideal Body Weight (IBW), Male: 154 lb     % Ideal Body Weight, Male (lb): 153.77 %                          Usual Weight Provided By: patient denies unintentional weight loss    Wt Readings from Last 5 Encounters:   09/04/24 107.4 kg (236 lb 12.8 oz)   08/21/24 104.3 kg (230 lb)     Weight Change(s) Since Admission: No new weight 9/5  Wt Readings from Last 1 Encounters:   09/04/24 1559 107.4 kg (236 lb 12.8 oz)   Admit Weight: 107.4 kg (236 lb 12.8 oz) (09/04/24 1559), Weight Method: Bed Scale    Estimated Needs    Weight Used For Calorie Calculations: 107 kg (236 lb)  Energy Calorie Requirements (kcal): 2278-3649 kcal (20-25 kcal/kg)  Energy Need Method: Kcal/kg  Weight Used For Protein Calculations: 107 kg (236 lb)  Protein Requirements:  g (0.8-1.0 g/kg)  Fluid Requirements (mL): 2140 mL/d (1.0 mL/kcal)        Enteral Nutrition     Patient not receiving enteral nutrition at this time.    Parenteral Nutrition     Patient not receiving parenteral nutrition support at this time.    Evaluation of Received Nutrient Intake    Calories: meeting estimated needs  Protein: meeting estimated needs    Patient Education     Not applicable.    Nutrition Diagnosis     PES: Overweight/obesity related to unknown etiology as evidenced by BMI 36.01. (new) "     Nutrition Interventions     Intervention(s): general/healthful diet, multivitamin/mineral supplement therapy, and collaboration with other providers    Goal: Meet greater than 80% of nutritional needs by follow-up. (new)  Goal: Verbalize understanding of diet by discharge. (new)    Nutrition Goals & Monitoring     Dietitian will monitor: food and beverage intake, energy intake, weight, weight change, and gastrointestinal profile  Discharge planning: resume home regimen  Nutrition Risk/Follow-Up: low (follow-up in 5-7 days)   Please consult if re-assessment needed sooner.

## 2024-09-05 NOTE — PT/OT/SLP EVAL
Physical Therapy Rehab Evaluation    Patient Name:  Lionel Nix   MRN:  [id number]    Recommendations:     Discharge Recommendations:  Low Intensity Therapy   Discharge Equipment Recommendations:     Barriers to discharge: Increased level of assist, Impaired functional mobility , Severity of Deficits , and decreased attention span.    Assessment:     Lionel Nix is a 59 y.o. male admitted with a medical diagnosis of Radiculopathy of sacral region.  He presents with the following impairments/functional limitations:  weakness, impaired endurance, impaired sensation, impaired functional mobility, gait instability, impaired balance, decreased lower extremity function, decreased safety awareness, pain, decreased ROM, orthopedic precautions.    PT Student Note: Pt presents c low back px, bilateral knee px and complaints of numbness in his bilateral LE. Pt is Leif for majority of functional mobility but needs constant VC's for remembering his precautions and correct technique for STS/transfers. Pt is very impulsive and not safe at times, and needs eyes on during the entire treatment session    Rehab Diagnosis: Severe lumbar stenosis with radiculopathy and spondylosis s/p TLIF L2-S1 decompression and fusion 8/19/2024. Pt. Has a past medical history of HTN, HLD, severe lumbar stenosis with lumbosacral radiculopathy and degenerative joint disease of the lumbar spine.    General Precautions: Standard, fall     Orthopedic Precautions: spinal precautions     Braces: N/A    Rehab Prognosis: Fair; patient would benefit from acute skilled PT services to address these deficits and reach maximum level of function.      History:     Past Medical History:   Diagnosis Date    Anxiety     Chronic right-sided low back pain     Enlarged prostate     Hyperlipidemia     Hypertension     Lumbosacral radiculopathy due to degenerative joint disease of spine     Neuritis or radiculitis due to rupture of lumbar intervertebral disc     Numbness  and tingling of both feet     Numbness and tingling of both legs     Obesity     Other spondylosis with radiculopathy, lumbar region        Past Surgical History:   Procedure Laterality Date    BACK SURGERY      COLONOSCOPY      HERNIA REPAIR      SHOULDER SURGERY Left     TRANSFORAMINAL LUMBAR INTERBODY FUSION (TLIF) USING COMPUTER-ASSISTED NAVIGATION Left 8/19/2024    Procedure: FUSION, SPINE, LUMBAR, TLIF, USING COMPUTER-ASSISTED NAVIGATION;  Surgeon: Car August MD;  Location[facility];  Service: Neurosurgery;  Laterality: Left;  L2 - S1 TRANSFORAMINAL INTERBODY FUSION / POSTERIOR LATERAL FUSION // PRONE PEDRO // DRILL // MICROSCOPE // O-ARM // DIRECT SPINE // NDM       Subjective     Patient Goal: I want to get back to work and be self sufficient.     Patient Comments: I have numbness in my legs, thank yall for what yall are doing.    Patients cultural, spiritual, Rastafari conflicts given the current situation: no       Living Environment  Number of Stairs, Main Entrance: none (just a threshold)    Prior Level of Function       Equipment used at home:  .  DME owned (not currently used): single point cane.      Upon discharge, patient will have assistance from Sister living in [place],.    Objective:     Communicated with KATHLEEN Vuong prior to session.  Patient found up in chair with    upon PT entry to room.    Vitals  Vitals With Activity  Pain Pain Rating 1: 10/10  Location - Side 1: Bilateral  Location 1: knee  Pain Addressed 1: Cessation of Activity     Respiratory Status: Room air    Functional Mobility    GGs   Admit Current   Status Goal   Functional Area: Care Score: Care Score: Care Score:   Roll Left and Right 88  Unsafe to perform due to breaking spinal precautions 88 Independent   Sit to Lying 4  CGA, good form while not breaking precautions. 4 Independent   Lying to Sitting on Side of Bed 88  Unsafe to perform due to breaking spinal precautions 88 Independent   Sit to Stand 3  Min A, VC's given  after 1st trial for correct hand placement. Many many VC's throughout session for correct hand placement. Heavy use of UE, pt shoots his knees forward and reaches for RW instead of using LE and correct hand placement. 3 Independent   Chair/Bed-to-Chair Transfer 3   Min A, VC's given after 1st trial for correct hand placement. Many many VC's throughout session for correct hand placement.   Step to t/f c RW.   3 Independent   Car Transfer 88  Unsafe to attempt due to impulsivity of pt. 88 Independent   Walk 10 Feet 4 4 Independent   Walk 50 Feet with Two Turns 4  Pt walked CGA with slow speed and short steps c L knee buckling that is cause for concern for a future fall. Pt is very impulsive when walking, stopping to show PT how he can't cross his legs, stopped to get coffee and got RW stuck on carpet nearly having a LOB. Pt walked 4 trials, 86ft, 82ft, 90ft, 70ft. Each walked stopped due to pt px or L knee buckling. 4 Independent   Walk 150 Feet 88   88 Independent   Walk 10 Feet Uneven Surface 88  Unsafe to attempt due to impulsivity of pt. 88 Independent   1 Step (Curb) 88  Unsafe to attempt due to impulsivity of pt. 88 Independent   4 Steps 88  Unsafe to attempt due to impulsivity of pt. 88 Independent   12 Steps 88  Unsafe to attempt due to impulsivity of pt. 88 Supervision or touching assistance   Picking Up Object 88  Unsafe to attempt due to impulsivity of pt. 88 Independent   Wheel 50 Feet with Two Turns 9 9 Not applicable   Wheel 150 Feet 9 9 Not applicable     Therapeutic Activities and Exercises:  Patient educated on role of acute care PT and PT POC and safety while in hospital including calling nurse for mobility  Patient educated about importance of OOB mobility and remaining up in chair most of the day.    5TSTS - Pt unable to perform due to not being able to STS w/out use of UE.    Timed Up & Go Test (TUG)  Assistive Device and/or Bracing Used: Rolling Walker  TUG Time: 54.85 seconds   Test time of > or  equal to 12 sec. are associated with high fall risk.    Activity Tolerance: Fair    Patient left up in chair with chair alarm on and WC seat belt applied .    Education Provided: roles and goals of PT/PTA, transfer training, bed mob, gait training, safety awareness, assistive device, fall prevention, oriented to student, and spinal precautions    Expected compliance: Moderate compliance      Plan:     During this hospitalization, patient to be seen 5 x/week (5-7x) to address the identified rehab impairments via gait training, therapeutic activities, therapeutic exercises, neuromuscular re-education and progress toward the following goals:    GOALS:   Multidisciplinary Problems       Physical Therapy Goals          Problem: Physical Therapy    Goal Priority Disciplines Outcome Goal Variances Interventions   Physical Therapy Goal     PT, PT/OT Progressing     Description: Bed Mobility:  Roll left and right independently.   Sit to supine transfer independently.   Supine to sit transfer independently.     Transfers:  Sit to stand transfer independently using RW.   Bed to chair transfer independently Stand Step  using RW.   Car transfer with supervision/touching assist using RW.    an object from the ground in standing position with supervision/touching assist using RW.     Mobility:  Ambulate 150 feet with supervision/touching assist using RW.  Ambulate 10 feet on uneven surfaces/ramps with supervision/touching assist using RW.   Pt ascended/descended a 4 inch curb with supervision/touching assist using RW.   Ascend/descend 4 stairs with supervision/touching assist using bilateral handrails.                          Plan of Care Expires:     PT Next Visit Date: 09/11/24  Plan of Care reviewed with: patient      Additional Infomation:     Pt is very impulsive, not safe, not oriented to the day of the week, pt thinks it is Saturday, then Wednesday. Pt also said he is 66 years old then said he was 60 years old. The  pt is 59 years old. Pt is very loquacious, requiring frequent redirection to task in order to participate with therapy instead of story-telling.  Must keep eyes on pt at all times during treatment sessions and explain activities being done with him clearly and concisely.       Time Tracking:     Therapy Time   PT Received On: 09/05/24  PT Start Time: 1030  PT Stop Time: 1200  PT Total Time (min): 90 min  PT Individual: 90  Missed Time:    Time Missed due to:      Billable Minutes: Evaluation 25, Gait Training 40, and Therapeutic Activity 25    09/05/2024

## 2024-09-05 NOTE — PLAN OF CARE
Admission PHQ-9 completed (score=10 moderate sxs).  Pt attributes his mood to having lost all of his possessions/home and having gone into financial ruin.  Support/empathy given.  Relayed result to ADRIÁN Sanders.

## 2024-09-05 NOTE — PLAN OF CARE
Problem: Wound  Goal: Optimal Pain Control and Function  Outcome: Progressing  Goal: Skin Health and Integrity  Outcome: Progressing

## 2024-09-05 NOTE — PLAN OF CARE
"Spoke with dtcelia Khanna ([phone]), a travelling nurse, then with dtr Yesica ([phone]), with whom pt has been staying.    Due to his bipolar manic episodes, patient vacillates between being nicej/sweet then hating his individual family members with a "fire burning passion in his soul."  Yesica has now blocked his calls (for her own sanity) but is willing to have him go to her home when he is released.  She is pleading that we make sure his mood is more stabilized before he goes home, as he is "a very sweet man" when his bipolar meds are on board as they were at home before.      Yesica reports a high tolerance to medicine; he has a hx of taking edibles with his pain medication at home.  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  After his injury, his doctor prescribed him a medical marijuana card, on top of his pain medication scripts.  She says he has been on a downward spiral since that time and handles pain "like a little girl."    Yesica says, "He can't take anxiety medicine" and that he has "turned into the devil" since they took him off of his regular mood medication regimen for this surgery.  She claims that his mood meds are all in a medication bag with pt here.    Yesica's home is located at [address] [place] [address].  Yesica has 4 children in her home.  Pt's wife is also staying at [facility] and will be there to assist.    Pt/wife recently sold their [employer] home and had been staying at [facility] until they eventually move to their home in [place].        Relayed to Yesica the need to wash pt's dirty clothing and bring clean clothing.  They will have to drive in from [place] (tomorrow or Saturday) to do so.  No family members are currently in the [place] area, which is why she wishes that the pt would have had surgery closer to [place].    Yesica is also requesting that we try to find pt a PCP in/near [place] for their convenience, as it will be difficult for them to get him back/forth to see Rafy Kuoine.    1221:  Spoke with patient later in day.  He is now saying he will go to his sister Dee Dee's home in [place] ([address] [place] [address])--2 story home with downstairs bedroom/bathroom with 2 steps at entrance to the home.  He relays that he has already spoken with Dee Dee about this plan, and she has agreed.  Dee Dee reportedly does work.  He says that he is only currently speaking with dtcelia Khanna and sister Dee Dee--no one else.  Pt does relay that he wants to get stronger before he leaves [facility], as he does not want his knees to buckle and for him to fall.

## 2024-09-05 NOTE — PT/OT/SLP EVAL
Recreational Therapy Evaluation      Date of Treatment: 09/06/24  Start Time: 1000  Stop Time: 1030  Total Time: 30 min  Missed Time:   Assessment      Lionel Nix is a 59 y.o. male admitted with a medical diagnosis of Radiculopathy of sacral region.  He presents with the following impairments/functional limitations:  weakness, impaired endurance, impaired functional mobility, gait instability, impaired balance, impaired cognition, decreased coordination, decreased upper extremity function, decreased lower extremity function, decreased safety awareness, pain, decreased ROM, impaired coordination .    Rehab Diagnosis:     Recent Surgery:     General Precautions: Standard, fall     Orthopedic Precautions:spinal precautions     Braces: N/A    Rehab Prognosis: Fair; patient would benefit from acute skilled Recreational Therapy services to address these deficits and reach maximum level of function.      Impairments: Balance deficits, Cognitive deficits, Coordination deficits, Decreased knowledge of condition, Endurance deficits, Mobility deficits, Pain, Safety awareness deficits, and Strength deficits  Rehab Potential: Fair, due to: Acute medical state   Treatment Recommendations: Continue with Skill TR Service to facilitate functional independence and address impairments/limitations   Treatment Diagnosis: Severe lumbar stenosis with radiculopathy and spondylosis, TLIF L2-S1 decompression and fusion, HTN, HLD, lumbosacral raadiculopathy and degenerative joint disease of the lumbar spine  Orientation: Identifies self  Affect/Behavior: Anxious, Impulsive, and Distracted  Safety/Judgement: impaired   Basic Command Following: impaired  Spiritual Cultural: no        History     Past Medical History:   Diagnosis Date    Anxiety     Chronic right-sided low back pain     Enlarged prostate     Hyperlipidemia     Hypertension     Lumbosacral radiculopathy due to degenerative joint disease of spine     Neuritis or radiculitis due  to rupture of lumbar intervertebral disc     Numbness and tingling of both feet     Numbness and tingling of both legs     Obesity     Other spondylosis with radiculopathy, lumbar region        Past Surgical History:   Procedure Laterality Date    BACK SURGERY      COLONOSCOPY      HERNIA REPAIR      SHOULDER SURGERY Left     TRANSFORAMINAL LUMBAR INTERBODY FUSION (TLIF) USING COMPUTER-ASSISTED NAVIGATION Left 8/19/2024    Procedure: FUSION, SPINE, LUMBAR, TLIF, USING COMPUTER-ASSISTED NAVIGATION;  Surgeon: Car August MD;  [facility];  Service: Neurosurgery;  Laterality: Left;  L2 - S1 TRANSFORAMINAL INTERBODY FUSION / POSTERIOR LATERAL FUSION // PRONE PEDRO // DRILL // MICROSCOPE // O-ARM // DIRECT SPINE // NDM       Home Environment     Admit Date: 09/05/24  Living Situation  People in Home:  (Pt. reports he used to live with eldest daughter and wife but will be staying with sister upon d/c)  Lives in: house  Patients Responsibilities: Caregiver to pet, Community mobility, , Financial management, Leisure/play/hobbies, Employed  Number of Children: 3  Occupation:[employer] Work    Instrumental Activities of Daily Living     Previous Hand Dominance: Right Current Hand Dominance: Right     Other iADL Information: Bilateral UE numbness       Cognitive Skills Building         Cognitive Observation Activity Assist Position Equipment Response            Comment:      Dynamic Activities      Activity Assist Position Equipment Response   Activity 1 Golf contact guard assistance and minimum assistance Standing Rolling walker and Golf balls, golf club fair   Comment: Sit to stand was min/contact guard as was dynamic standing balance/reaching.  Standing tolerance was 3 minutes.  Constant verbal cues were needed for safety awareness with sit to stand and for spinal precautions.  UE coordination was supervision.  Memory, problem solving and sequencing skills were min.  Constant verbal direction needed for  "placement of golf ball, how to hold golf club and how to swing golf club.  He kept stating that he was not so smart.  Hearing deficits interfered with participation.  He was impulsive and distracted throughout treatment       Fine Motor Activities      Activity Assist Position Equipment Response           Comment:        Goals     Patient Goals  Patient Goal 1: "To be able to walk and go on with life again."    Short Term Goals    Goal  Goal Status   Will increase sit to stand to supervision Initiated   Will improve dynamic standing balance/reaching to supervision Initiated                 Long Term Goals    Goal Goal Status   Will increase standing tolerance to 5 minutes Initiated   Will improve dynamic standing balance/reaching to setup Initiated                     Plan       Patient to be seen: Daily  Duration: 2 weeks  Treatments planned: Balance training, Cognitive training, Coordination, Energy conservation training, Fine motor, Safety education  Treatment plan/goals established with Patient/Caregiver: Yes     "

## 2024-09-05 NOTE — PLAN OF CARE
Problem: Occupational Therapy  Goal: Occupational Therapy Goal  Description: STG's to review at Re-eval:    Patient will demonstrate improved independence with upper body dressing while efficiently donning shirt and LSO brace with SPV.  Patient will demonstrate improved energy conservation and safety awareness while utilizing safety strategies taught during ADLs and transfers with Min cueing.  Patient will perform lower body dressing with use of AE prn and SPV.  Patient will perform toilet hygiene with CGA while maintaining spinal precautions and use of RW/grab bars as needed.  Patient will perform tub transfer with SBA and use of RW.   Patient will perform showering with SPV for safety while maintaining precautions and using TTB and grab bars as needed.   Outcome: Progressing

## 2024-09-05 NOTE — PROGRESS NOTES
[facility]  [facility]ound Care    Patient Name:  Lionel Nix   MRN:  [id number]  Date: 9/5/2024  Diagnosis: Radiculopathy of sacral region    History:     Past Medical History:   Diagnosis Date    Anxiety     Chronic right-sided low back pain     Enlarged prostate     Hyperlipidemia     Hypertension     Lumbosacral radiculopathy due to degenerative joint disease of spine     Neuritis or radiculitis due to rupture of lumbar intervertebral disc     Numbness and tingling of both feet     Numbness and tingling of both legs     Obesity     Other spondylosis with radiculopathy, lumbar region        Social History     Socioeconomic History    Marital status:    Tobacco Use    Smoking status: Never    Smokeless tobacco: Never   Substance and Sexual Activity    Alcohol use: Never    Drug use: Never     Social Determinants of Health     Financial Resource Strain: Medium Risk (8/9/2024)    [facility]    Overall Financial Resource Strain (CARDIA)     Difficulty of Paying Living Expenses: Somewhat hard   Food Insecurity: Food Insecurity Present (8/9/2024)    [facility]    Hunger Vital Sign     Worried About Running Out of Food in the Last Year: Often true     Ran Out of Food in the Last Year: Often true   Transportation Needs: No Transportation Needs (9/4/2024)    PRAPARE - Transportation     Lack of Transportation (Medical): No     Lack of Transportation (Non-Medical): No   Recent Concern: Transportation Needs - Unmet Transportation Needs (8/9/2024)    [facility]    PRAPARE - Transportation     Lack of Transportation (Medical): Yes     Lack of Transportation (Non-Medical): Yes   Physical Activity: Inactive (8/9/2024)     Received from [facility] Subsidiaries and Affiliates    Exercise Vital Sign     Days of Exercise per Week: 0 days     Minutes of Exercise per Session: 0 min   Stress: Stress Concern Present (8/9/2024)    Received from [facility] and Its Subsidiaries and Aff[facility]ates    [employer] [place] [employer] - Occupational Stress Questionnaire     Feeling of Stress : To some extent   Housing Stability: High Risk (8/9/2024)    [facility]    Housing Stability Vital Sign     Unable to Pay for Housing in the Last Year: Yes     Number of Times Moved in the Last Year: 1     Homeless in the Last Year: No       Precautions:     Allergies as of 08/26/2024 - Reviewed 08/19/2024   Allergen Reaction Noted    Gabapentin Anxiety and Palpitations 08/21/2024       [facility] Assessment Details/Treatment      09/05/24 1415        Wound 08/19/24 1425 Incision lower Lumbar spine   Date First Assessed/Time First Assessed: 08/19/24 1425   Present on Original Admission: No  Primary Wound Type: Incision  Orientation: lower  Location: Lumbar spine  Closure Method: Sutures;Steri-strips  Additional Comments: telfa, Medipore tape   Dressing Appearance Open to air   Appearance Dry;Steri-strips intact   Periwound Area Intact   Wound Edges Approximated   Dressing Removed   Dressing Change Due   (KRUPA)     Lower lumbar spine incision: open to air.  Trimmed and filed toenails.    09/05/2024

## 2024-09-05 NOTE — ACP (ADVANCE CARE PLANNING)
Advance Directives:   Living Will: No        Oral Declaration: Yes  [place]: No    Do Not Resuscitate Status: No    Medical Power of : No        Oral Declaration: Yes    Decision Making:  Patient answered questions  Goals of Care: The patient endorses that what is most important right now is to focus on quality of life, even if it means sacrificing a little time    Accordingly, we have decided that the best plan to meet the patient's goals includes continuing with treatment    Witnesses present:  Patient and provider  ACP 2/2 new patient   ACP discussed voluntarily    Total time spent: 16 minutes

## 2024-09-05 NOTE — NURSING
Nurses Note -- 4 Eyes      9/4/2024   07:10 PM      Skin assessed during: Q Shift Change      [] No Altered Skin Integrity Present    []Prevention Measures Documented      [x] Yes- Altered Skin Integrity Present or Discovered   [] LDA Added if Not in Epic (Describe Wound)   [x] New Altered Skin Integrity was Present on Admit and Documented in LDA   [] Wound Image Taken    Wound Care Consulted? No    Attending Nurse:  RICHIE Machado     Second RN/Staff Member:  MELODIE Hutchinson

## 2024-09-05 NOTE — PT/OT/SLP EVAL
Occupational Therapy Inpatient Rehab Evaluation    Name: Lionel Nix  MRN: [id number]    Recommendations:     Discharge Recommendations:  Low Intensity Therapy   Discharge Equipment Recommendations: bedside commode, bath bench, grab bar, walker, rolling   Barriers to discharge: Decreased caregiver support and ongoing medical needs    Assessment:  Lionel Nix is a 59 y.o. male admitted with a medical diagnosis of Radiculopathy of sacral region.  He presents with the following impairments/functional limitations:  weakness, impaired endurance, impaired sensation, impaired functional mobility, impaired self care skills, gait instability, impaired balance, visual deficits, impaired cognition, decreased coordination, decreased upper extremity function, decreased lower extremity function, decreased safety awareness, pain, impaired coordination, impaired skin, orthopedic precautions.    General Precautions: Standard, fall     Orthopedic Precautions:spinal precautions     Braces: N/A    Rehab Prognosis: Good; patient would benefit from acute skilled OT services to address these deficits and reach maximum level of function.      History:     Past Medical History:   Diagnosis Date    Anxiety     Chronic right-sided low back pain     Enlarged prostate     Hyperlipidemia     Hypertension     Lumbosacral radiculopathy due to degenerative joint disease of spine     Neuritis or radiculitis due to rupture of lumbar intervertebral disc     Numbness and tingling of both feet     Numbness and tingling of both legs     Obesity     Other spondylosis with radiculopathy, lumbar region        Past Surgical History:   Procedure Laterality Date    BACK SURGERY      COLONOSCOPY      HERNIA REPAIR      SHOULDER SURGERY Left     TRANSFORAMINAL LUMBAR INTERBODY FUSION (TLIF) USING COMPUTER-ASSISTED NAVIGATION Left 8/19/2024    Procedure: FUSION, SPINE, LUMBAR, TLIF, USING COMPUTER-ASSISTED NAVIGATION;  Surgeon: Car August MD;  Location: [facility]  "OR;  Service: Neurosurgery;  Laterality: Left;  L2 - S1 TRANSFORAMINAL INTERBODY FUSION / POSTERIOR LATERAL FUSION // PRONE PEDRO // DRILL // MICROSCOPE // O-ARM // DIRECT SPINE // NDM       Subjective     Orientation:  oriented self, place, and situation, patient not oriented to month during evaluation.    Chief Complaint: Post-op pain and numbness running down lateral bilateral lower extremities    Patient/Family Comments/goals: "to get better and go back to work"    Vitals  Vitals at Rest  Pain Pain Rating 1: 9/10  Location - Side 1: Bilateral  Location - Orientation 1: lower  Location 1: back  Pain Addressed 1: Reposition, Pre-medicate for activity, Distraction, Cessation of Activity, Nurse notified  Pain Rating Post-Intervention 1: 9/10     Vitals With Activity  Pain Pain Rating 1: 9/10  Location - Side 1: Bilateral  Location - Orientation 1: lower  Location 1: back  Pain Addressed 1: Reposition, Pre-medicate for activity, Distraction, Cessation of Activity, Nurse notified  Pain Rating Post-Intervention 1: 9/10     Respiratory Status: Room air    Patients cultural, spiritual, Bahai conflicts given the current situation: no     Living Environment   Living Environment  People in Home:  (Pt. reports he used to live with eldest daughter and wife but will be staying with sister upon d/c)  Living Arrangements: house  Home Accessibility: stairs within home (Simultaneous filing. User may not have seen previous data.)  Number of Stairs, Main Entrance: none (just a threshold)  Stairs, Within Home, Primary:  (a flight)  Number of Stairs, Within Home, Primary: other (see comments) (flight of stairs)  Home Layout: Able to live on 1st floor  Living Environment Comment:  (Pt. reports he used to live with eldest daughter and wife but will be staying with sister upon d/c)  Equipment Currently Used at Home: none (patient reports no DME or AD)  Shower Setup: Tub/Shower combo and Walk-in shower    Prior Level of " Function  BADL: Independent    IADL: Independent    Equipment used at home: none (patient reports no DME or AD).  DME owned (not currently used): none.      Upon discharge, patient will have assistance from sister and youngest daughter.    Objective:     Patient found supine with peripheral IV  upon OT entry to room.    Mobility   Patient completed:  Rolling/Turning to Left with stand by assistance  Rolling/Turning to Right with contact guard assistance  Supine to Sit with minimum assistance max verbal cues for adhering to spinal precautions with use of log roll technique   Sit to Stand Transfer with minimum assistance with rolling walker with verbal cues for hand placement  for safety   Stand to Sit Transfer with minimum assistance with rolling walker with verbal cues for hand and walker placement for safety  Toilet Transfer Step Transfer technique with minimum assistance with grab bars  Shower Transfer Step Transfer technique with minimum assistance with rolling walker to transfer tub bench    Functional Mobility   Patient able to ambulate with RW and Min A for safety bed<>shower bench with fair stability and increased pain.    ADLs     Current Status   Eating 6    Oral Hygiene 3 standing with RW at sink while propping self onto counter, patient visibly fatigued and in pain. Safety cues needed for impulsivity   Shower, Bathe Self 3 able to bathe upper extremities and upper legs and front of trunk, required A for washing lower legs and feet and buttocks. Verbal cues for sequencing due to decreased safety insight.   Upper Body Dressing 3 seated EOB to don LSO and shirt. Patient able to don shirt but required education and cueing for donning LSO appropriately.   Lower Body Dressing 3 Patient with education of use of reacher for threading BLE into briefs and pants as well as for sequencing due to impulsivity and decreased insight.    Toileting Hygiene 3 +bladder standing at toilet, patient with assistance for safety  due to impulsivity and maintenance of spinal precautions   Toilet Transfer 3 RW and grab bars for voiding in standing, impulsivity and safety awareness.   Putting On, Taking Off Footwear 2 AE education for donning socks, patient did not don shoes this date.     Limiting Factors for ADLs: motor, sensory, psychsocial, endurance, limited ROM, balance, weakness, visual, perceptual, coordination, ataxia, cognition, safety awareness, and pain    Exams     ROM:          -       WFL    Hand Dominance: Right    ROM Hand  Left Hand: WFL  Right Hand: WFL    Strength  Overall Strength:          -       WFL     Strength:   WFL, -4/5    Sensation  Decreased sensation along lateral aspects of bilateral lower extremities. No complaints of sensation issues in upper extremities.     Coordination:   Impaired gross motor coordination with impulsivity during functional transfers and ADLs impacting balance and stability    Visual/Perceptual  Intact and patient reports he has glasses but not present during time of eval.    Cognition:   WFL, except tends to exhibit decreased executive functioning skills with increased impulsivity, decreased memory, poor topic maintenance, poor safety awareness, and difficulty with motor planning requiring assistance throughout simple ADLs and functional transfers.     Balance    Sitting  Sitting Surface: EOB  Static: No UE Support, Good (+)  Dynamic: No UE extremity support, Fair    Standing  Static: Bilateral UE Extremity Support, Poor  Dynamic: Bilateral UE extremity support, Poor (-)    Righting Reactions and Posture/Deviations  Not assessed this date due to increased pain level this date.    Patient left up in chair with all lines intact, call button in reach, nurse notified, and RT present.    Education provided: Roles and goals of OT, ADLs, transfer training, bed mobility, body mechanics, assistive device, wheelchair precautions, modified goals, sequencing, safety precautions, fall prevention,  post-op precautions, equipment recommendations, and home safety    Multidisciplinary Problems       Occupational Therapy Goals          Problem: Occupational Therapy    Goal Priority Disciplines Outcome Interventions   Occupational Therapy Goal     OT, PT/OT Progressing    Description: STG's to review at Re-eval:    Patient will demonstrate improved independence with upper body dressing while efficiently donning shirt and LSO brace with SPV.  Patient will demonstrate improved energy conservation and safety awareness while utilizing safety strategies taught during ADLs and transfers with Min cueing.  Patient will perform lower body dressing with use of AE prn and SPV.  Patient will perform toilet hygiene with CGA while maintaining spinal precautions and use of RW/grab bars as needed.  Patient will perform tub transfer with SBA and use of RW.   Patient will perform showering with SPV for safety while maintaining precautions and using TTB and grab bars as needed.                      Plan     During this hospitalization, patient to be seen 5 x/week (5-7x/week) to address the identified rehab impairments via therapeutic exercises, therapeutic activities, self-care/home management, wheelchair management/training and progress toward the following goals:    Plan of Care Expires: 09/11/24    Time Tracking     OT Received On: 09/05/24  Time In 0830     Time Out 1000  Total Time 90 min  Therapy Time: OT Individual: 90    Billable Minutes: Evaluation 30 , Self-Care/ADLs 60    09/05/2024

## 2024-09-05 NOTE — NURSING
Nurses Note -- 4 Eyes      9/5/2024   9:43 AM      Skin assessed during: Q Shift Change      [] No Altered Skin Integrity Present    []Prevention Measures Documented      [x] Yes- Altered Skin Integrity Present or Discovered   [] LDA Added if Not in Epic (Describe Wound)   [x] New Altered Skin Integrity was Present on Admit and Documented in LDA   [] Wound Image Taken    Wound Care Consulted? No    Attending Nurse:  MELODIE Delarosa    Second RN/Staff Member:  Carole

## 2024-09-06 PROBLEM — M54.50 CHRONIC MIDLINE LOW BACK PAIN WITHOUT SCIATICA: Status: ACTIVE | Noted: 2021-08-09

## 2024-09-06 PROBLEM — K64.4 EXTERNAL HEMORRHOID, BLEEDING: Status: ACTIVE | Noted: 2019-08-23

## 2024-09-06 PROBLEM — R00.2 PALPITATIONS: Status: ACTIVE | Noted: 2024-05-02

## 2024-09-06 PROBLEM — R73.03 PREDIABETES: Status: ACTIVE | Noted: 2023-07-11

## 2024-09-06 PROBLEM — M25.50 POLYARTHRALGIA: Status: ACTIVE | Noted: 2022-03-07

## 2024-09-06 PROBLEM — F17.201 TOBACCO USE DISORDER, MODERATE, IN SUSTAINED REMISSION: Status: ACTIVE | Noted: 2018-09-05

## 2024-09-06 PROBLEM — Z86.010 HISTORY OF COLON POLYPS: Status: ACTIVE | Noted: 2021-08-09

## 2024-09-06 PROBLEM — F41.1 GAD (GENERALIZED ANXIETY DISORDER): Status: ACTIVE | Noted: 2018-09-05

## 2024-09-06 PROBLEM — E78.5 HYPERLIPIDEMIA: Status: ACTIVE | Noted: 2024-02-15

## 2024-09-06 PROBLEM — F60.89 CLUSTER B PERSONALITY DISORDER: Status: ACTIVE | Noted: 2020-03-18

## 2024-09-06 PROBLEM — Z86.59 HISTORY OF BIPOLAR DISORDER: Status: ACTIVE | Noted: 2018-07-25

## 2024-09-06 PROBLEM — R35.1 BENIGN PROSTATIC HYPERPLASIA WITH NOCTURIA: Status: ACTIVE | Noted: 2018-07-25

## 2024-09-06 PROBLEM — I73.9 CLAUDICATION: Status: ACTIVE | Noted: 2024-03-25

## 2024-09-06 PROBLEM — I10 ESSENTIAL HYPERTENSION: Status: ACTIVE | Noted: 2018-07-25

## 2024-09-06 PROBLEM — G89.29 CHRONIC MIDLINE LOW BACK PAIN WITHOUT SCIATICA: Status: ACTIVE | Noted: 2021-08-09

## 2024-09-06 PROBLEM — R73.01 IFG (IMPAIRED FASTING GLUCOSE): Status: ACTIVE | Noted: 2020-03-18

## 2024-09-06 PROBLEM — Z98.1 S/P LUMBAR SPINAL FUSION: Status: ACTIVE | Noted: 2024-09-06

## 2024-09-06 PROBLEM — Z87.891 FORMER SMOKER, STOPPED SMOKING MANY YEARS AGO: Status: ACTIVE | Noted: 2018-09-05

## 2024-09-06 PROBLEM — N40.1 BENIGN PROSTATIC HYPERPLASIA WITH NOCTURIA: Status: ACTIVE | Noted: 2018-07-25

## 2024-09-06 PROCEDURE — 25000003 PHARM REV CODE 250: Performed by: NURSE PRACTITIONER

## 2024-09-06 PROCEDURE — 63600175 PHARM REV CODE 636 W HCPCS: Mod: JZ,JG | Performed by: NURSE PRACTITIONER

## 2024-09-06 PROCEDURE — 97530 THERAPEUTIC ACTIVITIES: CPT | Mod: CQ

## 2024-09-06 PROCEDURE — 99223 1ST HOSP IP/OBS HIGH 75: CPT | Mod: ,,, | Performed by: NURSE PRACTITIONER

## 2024-09-06 PROCEDURE — 92523 SPEECH SOUND LANG COMPREHEN: CPT

## 2024-09-06 PROCEDURE — 99900031 HC PATIENT EDUCATION (STAT)

## 2024-09-06 PROCEDURE — 63600175 PHARM REV CODE 636 W HCPCS: Performed by: NURSE PRACTITIONER

## 2024-09-06 PROCEDURE — 11800000 HC REHAB PRIVATE ROOM

## 2024-09-06 PROCEDURE — 97116 GAIT TRAINING THERAPY: CPT

## 2024-09-06 PROCEDURE — 97110 THERAPEUTIC EXERCISES: CPT | Mod: CQ

## 2024-09-06 PROCEDURE — 94799 UNLISTED PULMONARY SVC/PX: CPT

## 2024-09-06 PROCEDURE — 94761 N-INVAS EAR/PLS OXIMETRY MLT: CPT

## 2024-09-06 PROCEDURE — 97110 THERAPEUTIC EXERCISES: CPT

## 2024-09-06 RX ORDER — DIAZEPAM 5 MG/1
5 TABLET ORAL EVERY 12 HOURS PRN
Status: DISCONTINUED | OUTPATIENT
Start: 2024-09-06 | End: 2024-09-09

## 2024-09-06 RX ADMIN — DIAZEPAM 5 MG: 5 TABLET ORAL at 04:09

## 2024-09-06 RX ADMIN — LISINOPRIL 40 MG: 10 TABLET ORAL at 08:09

## 2024-09-06 RX ADMIN — Medication 1 CAPSULE: at 08:09

## 2024-09-06 RX ADMIN — OXYCODONE HYDROCHLORIDE 10 MG: 5 TABLET ORAL at 02:09

## 2024-09-06 RX ADMIN — BACLOFEN 10 MG: 5 TABLET ORAL at 02:09

## 2024-09-06 RX ADMIN — QUETIAPINE 300 MG: 100 TABLET ORAL at 09:09

## 2024-09-06 RX ADMIN — CARVEDILOL 12.5 MG: 6.25 TABLET, FILM COATED ORAL at 08:09

## 2024-09-06 RX ADMIN — OXCARBAZEPINE 150 MG: 150 TABLET, FILM COATED ORAL at 08:09

## 2024-09-06 RX ADMIN — OXCARBAZEPINE 150 MG: 150 TABLET, FILM COATED ORAL at 09:09

## 2024-09-06 RX ADMIN — DEXAMETHASONE 2 MG: 2 TABLET ORAL at 09:09

## 2024-09-06 RX ADMIN — HYDROCHLOROTHIAZIDE 25 MG: 25 TABLET ORAL at 08:09

## 2024-09-06 RX ADMIN — BACLOFEN 10 MG: 5 TABLET ORAL at 06:09

## 2024-09-06 RX ADMIN — ENOXAPARIN SODIUM 40 MG: 40 INJECTION SUBCUTANEOUS at 04:09

## 2024-09-06 RX ADMIN — TAMSULOSIN HYDROCHLORIDE 0.4 MG: 0.4 CAPSULE ORAL at 09:09

## 2024-09-06 RX ADMIN — DOCUSATE SODIUM 100 MG: 100 CAPSULE, LIQUID FILLED ORAL at 08:09

## 2024-09-06 RX ADMIN — LABETALOL HYDROCHLORIDE 10 MG: 5 INJECTION, SOLUTION INTRAVENOUS at 09:09

## 2024-09-06 RX ADMIN — VENLAFAXINE HYDROCHLORIDE 300 MG: 150 CAPSULE, EXTENDED RELEASE ORAL at 08:09

## 2024-09-06 RX ADMIN — DEXAMETHASONE 2 MG: 2 TABLET ORAL at 08:09

## 2024-09-06 RX ADMIN — BACLOFEN 10 MG: 5 TABLET ORAL at 09:09

## 2024-09-06 RX ADMIN — ATORVASTATIN CALCIUM 40 MG: 40 TABLET, FILM COATED ORAL at 09:09

## 2024-09-06 RX ADMIN — ACETAMINOPHEN 650 MG: 325 TABLET ORAL at 06:09

## 2024-09-06 RX ADMIN — ACETAMINOPHEN 650 MG: 325 TABLET ORAL at 02:09

## 2024-09-06 RX ADMIN — PREGABALIN 25 MG: 25 CAPSULE ORAL at 08:09

## 2024-09-06 RX ADMIN — ACETAMINOPHEN 650 MG: 325 TABLET ORAL at 09:09

## 2024-09-06 RX ADMIN — HYDROCODONE BITARTRATE AND ACETAMINOPHEN 1 TABLET: 5; 325 TABLET ORAL at 08:09

## 2024-09-06 RX ADMIN — PREGABALIN 75 MG: 75 CAPSULE ORAL at 09:09

## 2024-09-06 RX ADMIN — CARVEDILOL 12.5 MG: 6.25 TABLET, FILM COATED ORAL at 09:09

## 2024-09-06 RX ADMIN — OXYCODONE HYDROCHLORIDE 10 MG: 5 TABLET ORAL at 06:09

## 2024-09-06 RX ADMIN — OXYCODONE HYDROCHLORIDE 10 MG: 5 TABLET ORAL at 10:09

## 2024-09-06 RX ADMIN — DOCUSATE SODIUM 100 MG: 100 CAPSULE, LIQUID FILLED ORAL at 09:09

## 2024-09-06 RX ADMIN — CLONAZEPAM 1 MG: 1 TABLET ORAL at 10:09

## 2024-09-06 NOTE — PROGRESS NOTES
[facility] ([facility])  Rehab Progress Note    Patient Name: Lionel Nix  MRN: [id number]  Age: 59 y.o. Sex: male  : 1965  Hospital Length of Stay: 2 days  Date of Service: 2024   Chief Complaint: Severe lumbar stenosis with radiculopathy and spondylosis s/p TLIF L2-S1 decompression and L3-S1 fusion on      Subjective:     Basic Information  Admit Information: 59-year-old male presented to [facility] on  for scheduled lumbar decompression and fusion. PMH significant for hypertension, hyperlipidemia, anxiety, depression.  On  tolerated TLIF L2-S1 decompression and L3-S1 fusion without perioperative complications.  On  discontinued TRAVIS drain.  Postop CT scan with good placement of hardware.  Psychiatry evaluated on .  Continued Effexor, Seroquel, and initiated Trileptal.  Continued with postoperative pain.  Lyrica initiated along with increase in hypertensive medications.  Reported fall on .  MRI cervical spine with no acute traumatic findings identified and degenerative listhesis at C4-5 and C6-7.  No neurosurgical interventions planned for C-spine. Tolerated transfer to [facility] inpatient rehab unit on  without incident.   Today's Information: No acute events overnight.  Sitting in chair comfortably.  Reports good sleep and appetite.  Last BM .  Vital signs at goal with no recent recorded fevers.  No labs or imaging today.  Reports anxiety is stable.    Review of patient's allergies indicates:   Allergen Reactions    Gabapentin Anxiety and Palpitations        Current Facility-Administered Medications:     acetaminophen tablet 650 mg, 650 mg, Oral, TID, Kelsey Gonsalez, FNP, 650 mg at 24 06    acetaminophen tablet 650 mg, 650 mg, Oral, Q8H PRN, Kelsey Gonsalez, FNP    atorvastatin tablet 40 mg, 40 mg, Oral, QHS, Carter Jha, FNP, 40 mg at 24    baclofen tablet 10 mg, 10 mg, Oral, TID, Alivai Garcia FNP, 10 mg at  09/06/24 0630    benzonatate capsule 100 mg, 100 mg, Oral, TID PRN, Carter Jha, FNP    carvediloL tablet 12.5 mg, 12.5 mg, Oral, BID, Alivia Garcia FNP, 12.5 mg at 09/06/24 0804    clonazePAM tablet 1 mg, 1 mg, Oral, BID PRN, Kelsey Gonsalez FNP    dexAMETHasone tablet 2 mg, 2 mg, Oral, Q12H, Alivia Garcia FNP, 2 mg at 09/06/24 0804    docusate sodium capsule 100 mg, 100 mg, Oral, BID, Carter Jha, FNP, 100 mg at 09/06/24 0805    enoxaparin injection 40 mg, 40 mg, Subcutaneous, Q24H (prophylaxis, 1700), Carter Jha, FNP, 40 mg at 09/05/24 1652    hydrALAZINE injection 10 mg, 10 mg, Intravenous, Q4H PRN, Carter Jha, FNP    hydroCHLOROthiazide tablet 25 mg, 25 mg, Oral, Daily, Alivia Garcia FNP, 25 mg at 09/06/24 0804    HYDROcodone-acetaminophen 5-325 mg per tablet 1 tablet, 1 tablet, Oral, Daily PRN, Carter Jha FNP, 1 tablet at 09/06/24 0802    hydrOXYzine pamoate capsule 50 mg, 50 mg, Oral, Nightly PRN, Carter Jha, FNP    labetalol 20 mg/4 mL (5 mg/mL) IV syring, 10 mg, Intravenous, Q4H PRN, Carter Jah, FNP    lisinopriL tablet 40 mg, 40 mg, Oral, Daily, Alivia Garcia FNP, 40 mg at 09/06/24 0804    metoprolol injection 10 mg, 10 mg, Intravenous, Q2H PRN, Carter Jha, FNP    nitroGLYCERIN SL tablet 0.4 mg, 0.4 mg, Sublingual, Q5 Min PRN, Carter Jha, FNP    ondansetron disintegrating tablet 8 mg, 8 mg, Oral, Q8H PRN, Carter Jha FNP    ondansetron injection 4 mg, 4 mg, Intravenous, Q8H PRN, Carter Jha FNP    OXcarbazepine tablet 150 mg, 150 mg, Oral, BID, Carter Jha FNP, 150 mg at 09/06/24 0804    oxyCODONE immediate release tablet 10 mg, 10 mg, Oral, Q4H PRN, Kelsey Gonsalez FNP, 10 mg at 09/06/24 1031    oxyCODONE immediate release tablet 5 mg, 5 mg, Oral, Q4H PRN, Kelsey Gonsalez FNP    polyethylene glycol packet 17 g, 17 g, Oral, BID PRN, Carter Jha FNP     "pregabalin capsule 25 mg, 25 mg, Oral, Before breakfast, Kelsey Gonsalez, FNP, 25 mg at 09/06/24 0804    pregabalin capsule 75 mg, 75 mg, Oral, QHS, Alivia Garcia, FNP, 75 mg at 09/05/24 2044    QUEtiapine tablet 300 mg, 300 mg, Oral, Nightly, Yudelka, Carter A, FNP, 300 mg at 09/05/24 2044    tamsulosin 24 hr capsule 0.4 mg, 0.4 mg, Oral, QHS, Yudelka, Carter A, FNP, 0.4 mg at 09/05/24 2044    venlafaxine 24 hr capsule 300 mg, 300 mg, Oral, Daily, Yudelka, Carter A, FNP, 300 mg at 09/06/24 0804    vitamin renal formula (B-complex-vitamin c-folic acid) 1 mg per capsule 1 capsule, 1 capsule, Oral, Daily, Kelsey Gonsalez, FNP, 1 capsule at 09/06/24 0804     Review of Systems   Complete 12-point review of symptoms negative except for what's mentioned in HPI     Objective:     /77 (BP Location: Left arm)   Pulse 82   Temp 97.6 °F (36.4 °C) (Oral)   Resp 18   Ht 5' 8" (1.727 m)   Wt 107.4 kg (236 lb 12.8 oz)   SpO2 95%   BMI 36.01 kg/m²        Physical Exam  Vitals reviewed.   Eyes:      Pupils: Pupils are equal, round, and reactive to light.   Cardiovascular:      Rate and Rhythm: Normal rate and regular rhythm.      Heart sounds: Normal heart sounds.   Pulmonary:      Effort: Pulmonary effort is normal.      Breath sounds: Normal breath sounds.   Abdominal:      General: Bowel sounds are normal.   Musculoskeletal:         General: Normal range of motion.   Skin:     General: Skin is warm.      Comments: Lumbar surgical incision dry and intact    Neurological:      General: No focal deficit present.      Mental Status: He is alert and oriented to person, place, and time.      Motor: Weakness present.   Psychiatric:         Mood and Affect: Mood normal.     *MD performed and documented physical examination       Lines/Drains/Airways       Peripheral Intravenous Line  Duration                  Peripheral IV - Single Lumen 08/24/24 1717 20 G No Anterior;Right Forearm 12 days              "       Labs  Admission on 09/04/2024   Component Date Value Ref Range Status    Sodium 09/05/2024 134 (L)  136 - 145 mmol/L Final    Potassium 09/05/2024 4.4  3.5 - 5.1 mmol/L Final    Chloride 09/05/2024 102  98 - 107 mmol/L Final    CO2 09/05/2024 25  23 - 31 mmol/L Final    Glucose 09/05/2024 110  82 - 115 mg/dL Final    Blood Urea Nitrogen 09/05/2024 15.7  8.4 - 25.7 mg/dL Final    Creatinine 09/05/2024 0.84  0.73 - 1.18 mg/dL Final    Calcium 09/05/2024 8.5 (L)  8.8 - 10.0 mg/dL Final    Protein Total 09/05/2024 5.8  5.8 - 7.6 gm/dL Final    Albumin 09/05/2024 3.0 (L)  3.4 - 4.8 g/dL Final    Globulin 09/05/2024 2.8  2.4 - 3.5 gm/dL Final    Albumin/Globulin Ratio 09/05/2024 1.1  1.1 - 2.0 ratio Final    Bilirubin Total 09/05/2024 0.4  <=1.5 mg/dL Final    ALP 09/05/2024 118  40 - 150 unit/L Final    ALT 09/05/2024 23  0 - 55 unit/L Final    AST 09/05/2024 16  5 - 34 unit/L Final    eGFR 09/05/2024 >60  mL/min/1.73/m2 Final    Anion Gap 09/05/2024 7.0  mEq/L Final    BUN/Creatinine Ratio 09/05/2024 19   Final    Magnesium Level 09/05/2024 2.20  1.60 - 2.60 mg/dL Final    Prealbumin 09/05/2024 35.3  16.0 - 42.0 mg/dL Final    Phosphorus Level 09/05/2024 3.5  2.3 - 4.7 mg/dL Final    Ferritin Level 09/05/2024 48.77  21.81 - 274.66 ng/mL Final    Iron Binding Capacity Unsaturated 09/05/2024 236  69 - 240 ug/dL Final    Iron Level 09/05/2024 36 (L)  65 - 175 ug/dL Final    Transferrin 09/05/2024 247  163 - 344 mg/dL Final    Iron Binding Capacity Total 09/05/2024 272  250 - 450 ug/dL Final    Iron Saturation 09/05/2024 13 (L)  20 - 50 % Final    WBC 09/05/2024 7.04  4.50 - 11.50 x10(3)/mcL Final    RBC 09/05/2024 3.46 (L)  4.70 - 6.10 x10(6)/mcL Final    Hgb 09/05/2024 10.3 (L)  14.0 - 18.0 g/dL Final    Hct 09/05/2024 31.1 (L)  42.0 - 52.0 % Final    MCV 09/05/2024 89.9  80.0 - 94.0 fL Final    MCH 09/05/2024 29.8  27.0 - 31.0 pg Final    MCHC 09/05/2024 33.1  33.0 - 36.0 g/dL Final    RDW 09/05/2024 12.5  11.5 -  17.0 % Final    Platelet 09/05/2024 253  130 - 400 x10(3)/mcL Final    MPV 09/05/2024 8.4  7.4 - 10.4 fL Final    Neut % 09/05/2024 71.3  % Final    Lymph % 09/05/2024 18.0  % Final    Mono % 09/05/2024 9.5  % Final    Eos % 09/05/2024 0.1  % Final    Basophil % 09/05/2024 0.0  % Final    Lymph # 09/05/2024 1.27  0.6 - 4.6 x10(3)/mcL Final    Neut # 09/05/2024 5.01  2.1 - 9.2 x10(3)/mcL Final    Mono # 09/05/2024 0.67  0.1 - 1.3 x10(3)/mcL Final    Eos # 09/05/2024 0.01  0 - 0.9 x10(3)/mcL Final    Baso # 09/05/2024 0.00  <=0.2 x10(3)/mcL Final    IG# 09/05/2024 0.08 (H)  0 - 0.04 x10(3)/mcL Final    IG% 09/05/2024 1.1  % Final    NRBC% 09/05/2024 0.0  % Final     Radiology  MRI cervical spine 9/4: Impression: No acute traumatic findings identified. Degenerative listhesis at C4-5 and C6-7.   Radiology  X-ray lumbar spine 8/26: Impression: Postoperative changes of posterior fusion with intact hardware     Assessment/Plan:     59 y.o. WM admitted on 9/4/2024     Severe lumbar stenosis with radiculopathy and spondylosis   - s/p TLIF L2-S1 decompression and L3-S1 fusion on 08/19  - LSO brace when OOB, spinal precautions  - continue                 Dexamethasone 2 mg b.i.d. (continue taper)                 Lyrica 75 mg at night                  Baclofen 10 mg t.i.d.   - follow-up with Neurosurgery outpatient     Bipolar disorder  - mood stable  - continue                 Venlafaxine 300 mg daily                 Oxcarbazepine 150 mg b.i.d.                  Seroquel 300 mg at night    Valium 5 mg b.i.d. p.r.n. anxiety (initiated 9/6)  - follow-up with psychiatrist outpatient     HLD  - FLP outpatient  - continue                Atorvastatin 40 mg at night      HTN  - blood pressure at goal  - continue                Coreg 12.5 mg b.i.d.                  Hydrochlorothiazide 25 mg daily                  Lisinopril 40 mg daily                 Hydralazine 10 mg every 2 hours as needed for BP > 160/90                Labetalol 10  mg every 2 hours as needed for BP > 160/90  - low sodium diet     Constipation  - stable  - continue                 Colace 100 mg b.i.d.      BPH  - stable  - continue                 Tamsulosin 0.4 mg at night     VTE Prophylaxis:  Lovenox 40 mg daily     POA: no  Living will: no  Contacts: Clemencia Nix (daughter) [phone]                     Dee Dee (sister) [phone]     CODE STATUS: Full  Internal Medicine (attending): Thor King MD  Physiatry (consulting):  Devang Cheung MD     OUTPATIENT PROVIDERS  PCP: Rafy Poole MD   Neurosurgery:  Car August MD   Psychiatry: Josef Sutton MD in [place], [place]     DISPOSITION:  Vital signs at goal.  No labs or imaging today.  Bowel management, sleep hygiene, appetite at goal.  Anxiety reported stable at this time.  Initiate Valium at a lower dose (was on home Valium p.r.n. 10 mg).  Initiate Valium 5 mg b.i.d. p.r.n. anxiety.  Physiatry titrating pain medications.  Aggressively mobilize with therapies as tolerated.  Monitor closely.  Notify of any acute changes.     Alivia Garcia NP conducted independent physical examination and assisted with medical documentation.     Total time spent on this encounter including chart review and direct MD + NP 1-on-1 patient interaction: 51 minutes   Over 50% of this time was spent in counseling and coordination of care

## 2024-09-06 NOTE — PT/OT/SLP PROGRESS
Occupational Therapy      Patient Name:  Lionel Nix   MRN:  [id number]    Patient not seen today secondary to Increased agitation, Patient unwilling to participate. Attempted to see patient for therapy, but he was on the phone with the bank at the time. Stated this was an important phone call that determined his future. OT returned 10 minutes later, pt explained family situation. While therapist was encouraging patient to put phone away and participate, patient then began another phone call with his friend who owns a [employer] store.    Amount of therapy minutes missed:  60 Minutes. Conference: 30 minutes    Will follow-up as schedule allows.    9/6/2024

## 2024-09-06 NOTE — PT/OT/SLP EVAL
[facility] - Rehabilitation Unit  Speech Language Pathology Department  Cognitive-Communication Evaluation    Patient Name:  Lionel Nix   MRN:  [id number]    Recommendations     General recommendations:  SLP intervention not indicated  Communication strategies:  none    Discharge therapy intensity: low intensity  Barriers to safe discharge: limited family/social support    History     Lionel Nix is a/n 59 y.o. male admitted with severe lumbar stenosis with radiculopathy and spondylosis s/p TLIF L2-S1 decompression and L3-S1 fusion on 08/19.    Past Medical History:   Diagnosis Date    Anxiety     Chronic right-sided low back pain     Enlarged prostate     Hyperlipidemia     Hypertension     Lumbosacral radiculopathy due to degenerative joint disease of spine     Neuritis or radiculitis due to rupture of lumbar intervertebral disc     Numbness and tingling of both feet     Numbness and tingling of both legs     Obesity     Other spondylosis with radiculopathy, lumbar region      Past Surgical History:   Procedure Laterality Date    BACK SURGERY      COLONOSCOPY      HERNIA REPAIR      SHOULDER SURGERY Left     TRANSFORAMINAL LUMBAR INTERBODY FUSION (TLIF) USING COMPUTER-ASSISTED NAVIGATION Left 8/19/2024    Procedure: FUSION, SPINE, LUMBAR, TLIF, USING COMPUTER-ASSISTED NAVIGATION;  Surgeon: Car August MD;  Location: [facility];  Service: Neurosurgery;  Laterality: Left;  L2 - S1 TRANSFORAMINAL INTERBODY FUSION / POSTERIOR LATERAL FUSION // PRONE PEDRO // DRILL // MICROSCOPE // O-ARM // DIRECT SPINE // NDM       Previous level of Function  Education:middle school  Occupation: full time job  Lives: with children  Handed: Right  Glasses: yes (reading)  Hearing Aids: no  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    Subjective     Patient awake, alert, restless, anxious, and distracted.  Patient goals: to return home  to prior level of independence   Spiritual/Cultural/Anabaptism Beliefs/Practices that affect care:  no    Pain/Comfort:  0/10    Respiratory Status:  room air    Objective     ORAL MUSCULATURE  Dentition: own teeth  Facial Movement: WFL  Buccal Strength & Mobility: WFL  Mandibular Strength & Mobility: WFL  Oral Labial Strength & Mobility: WFL  Lingual Strength & Mobility: WFL    SPEECH PRODUCTION  Phoneme Production: adequate  Voice Quality: adequate  Voice Production: adequate  Speech Rate: appropriate  Loudness: acceptable  Respiration: WFL for speech  Resonance: adequate  Prosody: adequate  Speech Intelligibility  Known Context: Greater that 90%  Unknown Context: Greater that 90%    AUDITORY COMPREHENSION  Identification:  Body parts: 100%  Following Directions:  3-Step: 40%  Yes/No Questions:  Complex: 90%    VERBAL EXPRESSION    Wh- Questions:  Object function: 80%    COGNITION  Orientation:  Person: yes  Place: yes  Time: yes  Situation: yes   Attention:  Focused: Impaired  Pragmatics:  Eye contact: Within Functional Limits  Communicative Intent: Within Functional Limits  Topic Maintenance: Impaired  Memory:  Delayed: 35%  Short Term: 25%  Long Term: 90%  Problem Solving  Functional complex: Within Functional Limits  Organization:  Sequencin%  Executive Function:  Reasonin%  Self monitorin%    Assessment     The patient demonstrated impaired cognitive functioning based on today's assessment. Areas of impairment noted during testing included comprehension of three step tasks, short term recall, and topic maintenance. At this time, the patient is not appropriate for cognitive therapy due to self reported instability resulting in his inability to focus on a single topic. Throughout the test the patient discussed his history of psychological disorders and insisted that he is stressed due to many personal factors at this time that are resulting in him appearing to be cognitively impaired. Reconsult ST  as appropriate for reevaluation when appropriate.     Goals     Multidisciplinary Problems       SLP Goals       Not on file                  Patient Education     Patient provided with verbal education regarding SLP POC.  Understanding was verbalized.    Plan     SLP Follow-Up:  No         Time Tracking     SLP Treatment Date:    09/06/24  Speech Start Time:  1500   Speech Stop Time:     1530   Speech Total Time (min):  30     Billable minutes:  Evaluation of Speech Sound Production with Comprehension and Expression, 30 minutes     09/06/2024

## 2024-09-06 NOTE — PT/OT/SLP PROGRESS
Recreational Therapy Treatment    Date of Treatment: 09/06/24  Start Time: 1000  Stop Time: 1030  Total Time: 30 min  Missed Time:     General Precautions: fall  Ortho Precautions: spinal precautions  Braces: LSO    Vitals   Vitals at Rest  BP    HR    O2 Sat    Pain 10/10     Vitals With Activity  BP    HR    O2 Sat    Pain 10/10       Treatment     Cognitive Skills Building   Cognitive Observation Activity Assist Position Equipment Response            Comment:          Dynamic Activities   Activity Assist Position Equipment Response   Activity 1 Bowling supervision Sitting Rubber bowling balls fair   Comment: Sat for this treatment due to pt c/o 10/10 back pain.  Dynamic sitting balance/reaching was supervision.  Constant verbal cues needed to sit away from back of w/c.  Sitting tolerance was 2-3 minutes.  UE coordination was supervision. Hand over hand assist needed for motor planning of both UE when rolling bowling balls.  Repeated directions needed on hand position of bowling balls and where to throw it.  He remains distracted and needed constant verbal cues to stay focused on task at hand. He also perseverated on his back pain and needing to see his Neurosurgeon      Fine Motor Activities   Activity Assist Position Equipment Response           Comment:          Additional Info: Pt needed verbal cues to stay focused on task at hand vs checking his phone.     Goals     Short Term Goals    Goal  Goal Status   Will increase sit to stand to supervision Progressing   Will improve dynamic standing balance/reaching to supervision Progressing                 Long Term Goals    Goal Goal Status   Will increase standing tolerance to 5 minutes Progressing   Will improve dynamic standing balance/reaching to setup Progressing

## 2024-09-06 NOTE — PT/OT/SLP PROGRESS
Physical Therapy Inpatient Rehab Treatment    Patient Name:  Lionel Nix   MRN:  [id number]    Recommendations:     Discharge Recommendations:  Low Intensity Therapy   Discharge Equipment Recommendations:     Barriers to discharge: Increased level of assist, Ongoing medical treatment, Impaired functional mobility , and Severity of Deficits     Assessment:     Lionel Nix is a 59 y.o. male admitted with a medical diagnosis of S/P lumbar spinal fusion.  He presents with the following impairments/functional limitations:  weakness, impaired endurance, impaired sensation, impaired functional mobility, gait instability, impaired balance, decreased lower extremity function, decreased safety awareness, pain, decreased ROM, orthopedic precautions.    PT Note: Session limited by pt being loquacious and easily distracted (aka manic).     Rehab Diagnosis: Severe lumbar stenosis with radiculopathy and spondylosis s/p TLIF L2-S1 decompression and fusion 8/19/2024. Pt. Has a past medical history of HTN, HLD, severe lumbar stenosis with lumbosacral radiculopathy and degenerative joint disease of the lumbar spine.    General Precautions: Standard, fall     Orthopedic Precautions:spinal precautions     Braces: N/A    Rehab Prognosis: Fair; patient would benefit from acute skilled PT services to address these deficits and reach maximum level of function.      History:     Past Medical History:   Diagnosis Date    Anxiety     Chronic right-sided low back pain     Enlarged prostate     Hyperlipidemia     Hypertension     Lumbosacral radiculopathy due to degenerative joint disease of spine     Neuritis or radiculitis due to rupture of lumbar intervertebral disc     Numbness and tingling of both feet     Numbness and tingling of both legs     Obesity     Other spondylosis with radiculopathy, lumbar region        Past Surgical History:   Procedure Laterality Date    BACK SURGERY      COLONOSCOPY      HERNIA REPAIR      SHOULDER SURGERY  "Left     TRANSFORAMINAL LUMBAR INTERBODY FUSION (TLIF) USING COMPUTER-ASSISTED NAVIGATION Left 8/19/2024    Procedure: FUSION, SPINE, LUMBAR, TLIF, USING COMPUTER-ASSISTED NAVIGATION;  Surgeon: Car August MD;  Location: [facility];  Service: Neurosurgery;  Laterality: Left;  L2 - S1 TRANSFORAMINAL INTERBODY FUSION / POSTERIOR LATERAL FUSION // PRONE PEDRO // DRILL // MICROSCOPE // O-ARM // DIRECT SPINE // NDM       Subjective     Patient comments: "I don't know what happened when I fell earlier"    Respiratory Status: Room air    Patients cultural, spiritual, Anglican conflicts given the current situation:      Objective:     Communicated with Dima, therapy tech prior to session.  Patient found with seatbelt in wheelchair with peripheral IV  upon PT entry to room.    Pt is  Manic, Alert, Cooperative, Easily distractible, and Impulsive.      Therapeutic Activities and Exercises:  Pt c/o pain; NSG administered medicine at start of session. D/t pt falling earlier this morning and pt's current mental status, all mobility was performed in the // bars with a tech keeping WC close behind pt for safety.   Pt ambulated 4x in // bars, VC to keep hands on // bars at all times and to lock knee before advancing other LE. 3 trials with  socks on, last trial with crocs on (pt reported more pain with last trial).   PT had to re-direct/re-focus pt throughout session.       Activity Tolerance: Fair    Patient left with seatbelt in wheelchair with  Bean, PTA present.    Education provided: roles and goals of PT/PTA, gait training, safety awareness, body mechanics, and fall prevention    Expected compliance: Low compliance    Plan:     During this hospitalization, patient to be seen 5 x/week (5-7x) to address the identified rehab impairments via gait training, therapeutic activities, therapeutic exercises, neuromuscular re-education and progress toward the following goals:    GOALS:   Multidisciplinary Problems       Physical " Therapy Goals          Problem: Physical Therapy    Goal Priority Disciplines Outcome Goal Variances Interventions   Physical Therapy Goal     PT, PT/OT Progressing     Description: Bed Mobility:  Roll left and right independently.   Sit to supine transfer independently.   Supine to sit transfer independently.     Transfers:  Sit to stand transfer independently using RW.   Bed to chair transfer independently Stand Step  using RW.   Car transfer with supervision/touching assist using RW.    an object from the ground in standing position with supervision/touching assist using RW.     Mobility:  Ambulate 150 feet with supervision/touching assist using RW.  Ambulate 10 feet on uneven surfaces/ramps with supervision/touching assist using RW.   Pt ascended/descended a 4 inch curb with supervision/touching assist using RW.   Ascend/descend 4 stairs with supervision/touching assist using bilateral handrails.                          Plan of Care Expires:     PT Next Visit Date: 09/11/24  Plan of Care reviewed with: patient    Additional Information:         Time Tracking:     Therapy Time  PT Start Time: 1030  PT Stop Time: 1100  PT Total Time (min): 30 min   PT Individual: 30  Missed Time:    Time Missed due to:      Billable Minutes: Gait Training 15 and Therapeutic Activity 15    09/06/2024

## 2024-09-06 NOTE — PT/OT/SLP PROGRESS
Occupational Therapy Inpatient Rehab Treatment    Name: Lionel Nix  MRN: [id number]    Assessment:  Lionel Nix is a 59 y.o. male admitted with a medical diagnosis of S/P lumbar spinal fusion.  He presents with the following impairments/functional limitations:  weakness, impaired endurance, impaired sensation, impaired self care skills, impaired functional mobility, gait instability, impaired balance, visual deficits, impaired cognition, decreased coordination, decreased lower extremity function, decreased safety awareness, pain, impaired coordination, decreased ROM, impaired skin, orthopedic precautions.    General Precautions: Standard, fall     Orthopedic Precautions:spinal precautions     Braces: LSO    Rehab Prognosis: Good; patient would benefit from acute skilled OT services to address these deficits and reach maximum level of function.      History:     Past Medical History:   Diagnosis Date    Anxiety     Chronic right-sided low back pain     Enlarged prostate     Hyperlipidemia     Hypertension     Lumbosacral radiculopathy due to degenerative joint disease of spine     Neuritis or radiculitis due to rupture of lumbar intervertebral disc     Numbness and tingling of both feet     Numbness and tingling of both legs     Obesity     Other spondylosis with radiculopathy, lumbar region        Past Surgical History:   Procedure Laterality Date    BACK SURGERY      COLONOSCOPY      HERNIA REPAIR      SHOULDER SURGERY Left     TRANSFORAMINAL LUMBAR INTERBODY FUSION (TLIF) USING COMPUTER-ASSISTED NAVIGATION Left 8/19/2024    Procedure: FUSION, SPINE, LUMBAR, TLIF, USING COMPUTER-ASSISTED NAVIGATION;  Surgeon: Car August MD;  Location: [facility];  Service: Neurosurgery;  Laterality: Left;  L2 - S1 TRANSFORAMINAL INTERBODY FUSION / POSTERIOR LATERAL FUSION // PRONE PEDRO // DRILL // MICROSCOPE // O-ARM // DIRECT SPINE // NDM       Subjective     Orientation: Oriented x4    Chief Complaint: back pain from upper  "neck to tailbone    Patient/Family Comments/goals: "get better and go home"    Vitals   Pain: 10/10 throughout session impacting performance and participation in therapeutic exercises and activities.    Respiratory Status: Room air    Patients cultural, spiritual, Anabaptism conflicts given the current situation: no     Objective:     Patient found up in chair with peripheral IV  upon OT entry to OT gym.    Mobility   Patient did not complete mobility this session due to recent fall in PT session and increased pain level.    Limiting Factors for ADLs: motor, sensory, psychsocial, endurance, limited ROM, balance, weakness, body habitus, visual, perceptual, coordination, cognition, safety awareness, and pain     Therapeutic Exercise  Patient completed 5 minutes of UBE forward and backward but exhibited increased onset of pain while going backwards. Patient was able to tolerate 1x10 theraband exercises of horizontal abduction and bicep curls, could not tolerate any more sets due to increased pain in cervical and lumbar region.     Patient left up in chair with all lines intact, call button in reach, RT present for subsequent session.    Education provided: Roles and goals of OT, ADLs, transfer training, bed mobility, body mechanics, assistive device, wheelchair precautions, modified goals, sequencing, safety precautions, fall prevention, post-op precautions, equipment recommendations, and home safety    Multidisciplinary Problems       Occupational Therapy Goals          Problem: Occupational Therapy    Goal Priority Disciplines Outcome Interventions   Occupational Therapy Goal     OT, PT/OT Progressing    Description: STG's to review at Re-eval:    Patient will demonstrate improved independence with upper body dressing while efficiently donning shirt and LSO brace with SPV.  Patient will demonstrate improved energy conservation and safety awareness while utilizing safety strategies taught during ADLs and transfers " with Min cueing.  Patient will perform lower body dressing with use of AE prn and SPV.  Patient will perform toilet hygiene with CGA while maintaining spinal precautions and use of RW/grab bars as needed.  Patient will perform tub transfer with SBA and use of RW.   Patient will perform showering with SPV for safety while maintaining precautions and using TTB and grab bars as needed.                        Time Tracking     OT Received On: 09/06/24  Time In 0930     Time Out 1000  Total Time 30 min  Therapy Time: OT Individual: 30    Billable Minutes: Therapeutic Exercise 30    09/06/2024
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
## 2024-09-06 NOTE — PLAN OF CARE
Problem: Rehabilitation ([facility]) Plan of Care  Goal: Plan of Care Review  9/6/2024 0543 by Nelly Galindo LPN  Outcome: Progressing  9/5/2024 2349 by Nelly Galindo LPN  Outcome: Progressing  Goal: Patient-Specific Goal (Individualized)  9/6/2024 0543 by Nelly Galindo LPN  Outcome: Progressing  9/5/2024 2349 by Nelly Galindo LPN  Outcome: Progressing  Goal: Absence of New-Onset Illness or Injury  9/6/2024 0543 by Nelly Galindo LPN  Outcome: Progressing  9/5/2024 2349 by Nelly Galindo LPN  Outcome: Progressing  Goal: Optimal Comfort and Wellbeing  9/6/2024 0543 by Nelly Galindo LPN  Outcome: Progressing  9/5/2024 2349 by Nelly Galindo LPN  Outcome: Progressing  Goal: Home and Community Transition Plan Established  9/6/2024 0543 by Nelly Galindo LPN  Outcome: Progressing  9/5/2024 2349 by Nelly Galindo LPN  Outcome: Progressing

## 2024-09-06 NOTE — PLAN OF CARE
Problem: Wound  Goal: Optimal Wound Healing  Outcome: Progressing  Intervention: Promote Wound Healing  Flowsheets (Taken 9/6/2024 1444)  Sleep/Rest Enhancement:   awakenings minimized   regular sleep/rest pattern promoted     Problem: Pain Acute  Goal: Optimal Pain Control and Function  Outcome: Progressing  Intervention: Develop Pain Management Plan  Flowsheets (Taken 9/6/2024 1444)  Pain Management Interventions:   around-the-clock dosing utilized   care clustered  Intervention: Prevent or Manage Pain  Flowsheets (Taken 9/6/2024 1444)  Sensory Stimulation Regulation: music on  Bowel Elimination Promotion:   adequate fluid intake promoted   ambulation promoted  Medication Review/Management: medications reviewed

## 2024-09-06 NOTE — PT/OT/SLP PROGRESS
Physical Therapy Inpatient Rehab Treatment    Patient Name:  Lionel Nxi   MRN:  [id number]    Recommendations:     Discharge Recommendations:  Low Intensity Therapy   Discharge Equipment Recommendations:     Barriers to discharge: Impaired functional mobility  and Severity of Deficits     Assessment:     Lionel Nix is a 59 y.o. male admitted with a medical diagnosis of S/P lumbar spinal fusion.  He presents with the following impairments/functional limitations:  weakness, impaired endurance, impaired sensation, impaired functional mobility, gait instability, impaired balance, decreased lower extremity function, decreased safety awareness, pain, decreased ROM, orthopedic precautions .    Pt is very impulsive and has increase difficulty stay on task and following instructions. Pt requires constant vc to follow spinal px.     Rehab Diagnosis: Severe lumbar stenosis with radiculopathy and spondylosis s/p TLIF L2-S1 decompression and fusion 8/19/2024. Pt. Has a past medical history of HTN, HLD, severe lumbar stenosis with lumbosacral radiculopathy and degenerative joint disease of the lumbar spine.    General Precautions: Standard, fall     Orthopedic Precautions:spinal precautions     Braces: N/A    Rehab Prognosis: Fair; patient would benefit from acute skilled PT services to address these deficits and reach maximum level of function.      History:     Past Medical History:   Diagnosis Date    Anxiety     Chronic right-sided low back pain     Enlarged prostate     Hyperlipidemia     Hypertension     Lumbosacral radiculopathy due to degenerative joint disease of spine     Neuritis or radiculitis due to rupture of lumbar intervertebral disc     Numbness and tingling of both feet     Numbness and tingling of both legs     Obesity     Other spondylosis with radiculopathy, lumbar region        Past Surgical History:   Procedure Laterality Date    BACK SURGERY      COLONOSCOPY      HERNIA REPAIR      SHOULDER SURGERY Left  "    TRANSFORAMINAL LUMBAR INTERBODY FUSION (TLIF) USING COMPUTER-ASSISTED NAVIGATION Left 8/19/2024    Procedure: FUSION, SPINE, LUMBAR, TLIF, USING COMPUTER-ASSISTED NAVIGATION;  Surgeon: Car August MD;  Location: [facility];  Service: Neurosurgery;  Laterality: Left;  L2 - S1 TRANSFORAMINAL INTERBODY FUSION / POSTERIOR LATERAL FUSION // PRONE PEDRO // DRILL // MICROSCOPE // O-ARM // DIRECT SPINE // NDM       Subjective     Patient comments: "just tell me what I need to do because I can't concentrate"     Respiratory Status: Room air    Patients cultural, spiritual, Shinto conflicts given the current situation:      Objective:     Communicated with rn prior to session.  Patient found up in chair with peripheral IV  upon PT entry to room.    Pt is Oriented x3 and Alert, Cooperative, Motivated, Easily distractible, and Impulsive.      Functional Mobility:        Current   Status  Discharge   Goal   Functional Area: Care Score:    Roll Left and Right   Independent   Sit to Lying 4  Vc to follow precautions  Independent   Lying to Sitting on Side of Bed 4  Vc to follow precautions  Independent   Sit to Stand 4  W/rw, cga for safety and vc for proper technique Independent   Chair/Bed-to-Chair Transfer 4  W/rw, cga for safety. Independent   Car Transfer   Independent   Walk 10 Feet 1 Independent   Walk 50 Feet with Two Turns 1  Pt amb 50ft x2 w/rw and mod assist of one and another following close with wc. Pt required vc to widen RONY and keep rw close. Pt distance limited by therapist due to pt knees hyperextending and buckling.  Independent   Walk 150 Feet   Independent   Walk 10 Feet Uneven Surface   Independent   1 Step (Curb)   Independent   4 Steps   Independent   12 Steps   Supervision or touching assistance   Picking Up Object   Independent   Wheel 50 Feet with Two Turns   Not applicable   Wheel 150 Feet   Not applicable       Therapeutic Activities and Exercises:  Pt was preforming toe taps on 4in box w/rw " and wc parked close behind. While preforming pt knee buckled and pt was lowered to floor by therapist.  Pt required max assist of 2 people to place pt back in chair. Dr helms present and pt stated no c/o after assisted fall. Nurse notified.       Therapeutic exercises were performed seated edge of mat: marches with 2 weights, hip adductions isometrics against a ball, long arc quads with 2 weights, and calf raises/toe raises with 2 weights and supine on mat: quad sets, glute sets, short arc quads with 2 weights, hip abduction with 2 weights, and heel slides with 2 weights, 20 reps each.    Pt preformed wc mobility 300ft w/bilateral UE.     Activity Tolerance: Good    Patient left up in chair with all lines intact and call button in reach.    Education provided: roles and goals of PT/PTA and transfer training    Expected compliance: Low compliance    Plan:     During this hospitalization, patient to be seen 5 x/week (5-7x) to address the identified rehab impairments via gait training, therapeutic activities, therapeutic exercises, neuromuscular re-education and progress toward the following goals:    GOALS:   Multidisciplinary Problems       Physical Therapy Goals          Problem: Physical Therapy    Goal Priority Disciplines Outcome Goal Variances Interventions   Physical Therapy Goal     PT, PT/OT Progressing     Description: Bed Mobility:  Roll left and right independently.   Sit to supine transfer independently.   Supine to sit transfer independently.     Transfers:  Sit to stand transfer independently using RW.   Bed to chair transfer independently Stand Step  using RW.   Car transfer with supervision/touching assist using RW.    an object from the ground in standing position with supervision/touching assist using RW.     Mobility:  Ambulate 150 feet with supervision/touching assist using RW.  Ambulate 10 feet on uneven surfaces/ramps with supervision/touching assist using RW.   Pt ascended/descended a 4  inch curb with supervision/touching assist using RW.   Ascend/descend 4 stairs with supervision/touching assist using bilateral handrails.                          Plan of Care Expires:     PT Next Visit Date: 09/11/24  Plan of Care reviewed with: patient    Additional Information:         Time Tracking:     Therapy Time  PT Received On: 09/06/24  PT Start Time:  (830;1100)  PT Stop Time:  (930;1200)   PT Individual: 120  Missed Time:    Time Missed due to:      Billable Minutes: Gait Training 15, Therapeutic Activity 60, and Therapeutic Exercise 45    09/06/2024

## 2024-09-06 NOTE — PROGRESS NOTES
09/06/24 1000   Rec Therapy Time Calculation   Date of Treatment 09/06/24   Rec Start Time 1000   Rec Stop Time 1030   Rec Total Time (min) 30 min   Time   Treatment time 2 units   Charges   $Therapeutic Exercise 2 units   Precautions   General Precautions fall   Orthopedic Precautions  spinal precautions   Braces LSO   Pain/Comfort   Pain Rating 1 10/10   Location - Side 1 Bilateral   Location - Orientation 1 upper   Location 1 back   Pain Addressed 1 Reposition;Nurse notified   OTHER   Rehab identified problem list/impairments weakness;impaired endurance;impaired functional mobility;gait instability;impaired balance;decreased coordination;decreased upper extremity function;decreased lower extremity function;decreased safety awareness   Values/Beliefs/Spiritual Care   Spiritual, Cultural Beliefs, Orthodoxy Practices, Values that Affect Care no   Overall Level of Functioning   Activity Tolerance Independent   Dynamic Sitting Balance/Reaching Mod Indep   Dynamic Standing Balance/Reaching Does not occur   Right UE Coodination/Dexterity Mod Indep   Left UE Coordination/Dexterity Mod Indep   Problem Solving/Sequencing Skills Standby Assist   Memory Recall Standby Assist   R/L Neglect/Inattention Does not occur   Attention Span Standby Assist   Social Interaction Independent   Recreational Therapy Short Term Goals   Short Term Goal 1 Progression Progressing   Short Term Goal 2 Progression Progressing   Recreational Therapy Long Term Goals   Long Term Goal 1 Progression Progressing   Long Term Goal 2 Progression Progressing   Plan   Patient to be seen Daily   Planned Duration 2 weeks   Treatments Planned Balance training;Cognitive training;Coordination;Energy conservation training;Safety education   Treatment plan/goals estblished with Patient/Caregiver Yes

## 2024-09-07 PROCEDURE — 25000003 PHARM REV CODE 250: Performed by: NURSE PRACTITIONER

## 2024-09-07 PROCEDURE — 97110 THERAPEUTIC EXERCISES: CPT | Mod: CQ

## 2024-09-07 PROCEDURE — 94799 UNLISTED PULMONARY SVC/PX: CPT

## 2024-09-07 PROCEDURE — 99900031 HC PATIENT EDUCATION (STAT)

## 2024-09-07 PROCEDURE — 94761 N-INVAS EAR/PLS OXIMETRY MLT: CPT

## 2024-09-07 PROCEDURE — 63600175 PHARM REV CODE 636 W HCPCS: Performed by: NURSE PRACTITIONER

## 2024-09-07 PROCEDURE — 97530 THERAPEUTIC ACTIVITIES: CPT | Mod: CO

## 2024-09-07 PROCEDURE — 97110 THERAPEUTIC EXERCISES: CPT | Mod: CO

## 2024-09-07 PROCEDURE — 11800000 HC REHAB PRIVATE ROOM

## 2024-09-07 PROCEDURE — 97530 THERAPEUTIC ACTIVITIES: CPT | Mod: CQ

## 2024-09-07 RX ORDER — PREGABALIN 75 MG/1
75 CAPSULE ORAL 2 TIMES DAILY
Status: DISCONTINUED | OUTPATIENT
Start: 2024-09-07 | End: 2024-09-09

## 2024-09-07 RX ORDER — KETOROLAC TROMETHAMINE 30 MG/ML
30 INJECTION, SOLUTION INTRAMUSCULAR; INTRAVENOUS ONCE
Status: COMPLETED | OUTPATIENT
Start: 2024-09-07 | End: 2024-09-07

## 2024-09-07 RX ADMIN — CARVEDILOL 12.5 MG: 6.25 TABLET, FILM COATED ORAL at 08:09

## 2024-09-07 RX ADMIN — OXYCODONE HYDROCHLORIDE 10 MG: 5 TABLET ORAL at 01:09

## 2024-09-07 RX ADMIN — KETOROLAC TROMETHAMINE 30 MG: 30 INJECTION, SOLUTION INTRAMUSCULAR; INTRAVENOUS at 08:09

## 2024-09-07 RX ADMIN — OXYCODONE HYDROCHLORIDE 10 MG: 5 TABLET ORAL at 09:09

## 2024-09-07 RX ADMIN — ENOXAPARIN SODIUM 40 MG: 40 INJECTION SUBCUTANEOUS at 05:09

## 2024-09-07 RX ADMIN — ACETAMINOPHEN 650 MG: 325 TABLET ORAL at 01:09

## 2024-09-07 RX ADMIN — LISINOPRIL 40 MG: 10 TABLET ORAL at 08:09

## 2024-09-07 RX ADMIN — DEXAMETHASONE 2 MG: 2 TABLET ORAL at 08:09

## 2024-09-07 RX ADMIN — OXCARBAZEPINE 150 MG: 150 TABLET, FILM COATED ORAL at 08:09

## 2024-09-07 RX ADMIN — BACLOFEN 10 MG: 5 TABLET ORAL at 01:09

## 2024-09-07 RX ADMIN — OXCARBAZEPINE 150 MG: 150 TABLET, FILM COATED ORAL at 09:09

## 2024-09-07 RX ADMIN — VENLAFAXINE HYDROCHLORIDE 300 MG: 150 CAPSULE, EXTENDED RELEASE ORAL at 08:09

## 2024-09-07 RX ADMIN — BACLOFEN 10 MG: 5 TABLET ORAL at 05:09

## 2024-09-07 RX ADMIN — CARVEDILOL 12.5 MG: 6.25 TABLET, FILM COATED ORAL at 09:09

## 2024-09-07 RX ADMIN — DOCUSATE SODIUM 100 MG: 100 CAPSULE, LIQUID FILLED ORAL at 08:09

## 2024-09-07 RX ADMIN — HYDROCHLOROTHIAZIDE 25 MG: 25 TABLET ORAL at 08:09

## 2024-09-07 RX ADMIN — ACETAMINOPHEN 650 MG: 325 TABLET ORAL at 05:09

## 2024-09-07 RX ADMIN — ACETAMINOPHEN 650 MG: 325 TABLET ORAL at 09:09

## 2024-09-07 RX ADMIN — BACLOFEN 10 MG: 5 TABLET ORAL at 09:09

## 2024-09-07 RX ADMIN — ATORVASTATIN CALCIUM 40 MG: 40 TABLET, FILM COATED ORAL at 09:09

## 2024-09-07 RX ADMIN — OXYCODONE HYDROCHLORIDE 10 MG: 5 TABLET ORAL at 05:09

## 2024-09-07 RX ADMIN — DOCUSATE SODIUM 100 MG: 100 CAPSULE, LIQUID FILLED ORAL at 09:09

## 2024-09-07 RX ADMIN — QUETIAPINE 300 MG: 100 TABLET ORAL at 09:09

## 2024-09-07 RX ADMIN — POLYETHYLENE GLYCOL 3350 17 G: 17 POWDER, FOR SOLUTION ORAL at 09:09

## 2024-09-07 RX ADMIN — CLONAZEPAM 1 MG: 1 TABLET ORAL at 09:09

## 2024-09-07 RX ADMIN — DEXAMETHASONE 2 MG: 2 TABLET ORAL at 09:09

## 2024-09-07 RX ADMIN — PREGABALIN 75 MG: 75 CAPSULE ORAL at 09:09

## 2024-09-07 RX ADMIN — TAMSULOSIN HYDROCHLORIDE 0.4 MG: 0.4 CAPSULE ORAL at 09:09

## 2024-09-07 RX ADMIN — PREGABALIN 75 MG: 75 CAPSULE ORAL at 08:09

## 2024-09-07 RX ADMIN — Medication 1 CAPSULE: at 08:09

## 2024-09-07 NOTE — PLAN OF CARE
Problem: Wound  Goal: Improved Oral Intake  Outcome: Progressing     Problem: Fall Injury Risk  Goal: Absence of Fall and Fall-Related Injury  Outcome: Progressing  Intervention: Promote Injury-Free Environment  Flowsheets (Taken 9/7/2024 1443)  Safety Promotion/Fall Prevention:   assistive device/personal item within reach   side rails raised x 2   nonskid shoes/socks when out of bed   supervised activity   Fall Risk reviewed with patient/family     Problem: Pain Acute  Goal: Optimal Pain Control and Function  Outcome: Progressing  Intervention: Develop Pain Management Plan  Flowsheets (Taken 9/7/2024 1443)  Pain Management Interventions:   care clustered   pain management plan reviewed with patient/caregiver   medication offered  Intervention: Prevent or Manage Pain  Flowsheets (Taken 9/7/2024 1443)  Bowel Elimination Promotion: adequate fluid intake promoted

## 2024-09-07 NOTE — PT/OT/SLP PROGRESS
Occupational Therapy Inpatient Rehab Treatment    Name: Lionel Nix  MRN: [id number]    Assessment:  Lionel Nix is a 59 y.o. male admitted with a medical diagnosis of S/P lumbar spinal fusion.  He presents with the following impairments/functional limitations:  weakness, impaired endurance, impaired self care skills, impaired functional mobility, gait instability, impaired balance, impaired cognition, decreased upper extremity function, decreased lower extremity function, decreased safety awareness, pain, orthopedic precautions .    General Precautions: Standard, fall     Orthopedic Precautions:spinal precautions     Braces: LSO    Rehab Prognosis: Good; patient would benefit from acute skilled OT services to address these deficits and reach maximum level of function.      History:     Past Medical History:   Diagnosis Date    Anxiety     Chronic right-sided low back pain     Enlarged prostate     Hyperlipidemia     Hypertension     Lumbosacral radiculopathy due to degenerative joint disease of spine     Neuritis or radiculitis due to rupture of lumbar intervertebral disc     Numbness and tingling of both feet     Numbness and tingling of both legs     Obesity     Other spondylosis with radiculopathy, lumbar region        Past Surgical History:   Procedure Laterality Date    BACK SURGERY      COLONOSCOPY      HERNIA REPAIR      SHOULDER SURGERY Left     TRANSFORAMINAL LUMBAR INTERBODY FUSION (TLIF) USING COMPUTER-ASSISTED NAVIGATION Left 8/19/2024    Procedure: FUSION, SPINE, LUMBAR, TLIF, USING COMPUTER-ASSISTED NAVIGATION;  Surgeon: Car August MD;  Location: [facility];  Service: Neurosurgery;  Laterality: Left;  L2 - S1 TRANSFORAMINAL INTERBODY FUSION / POSTERIOR LATERAL FUSION // PRONE PEDRO // DRILL // MICROSCOPE // O-ARM // DIRECT SPINE // NDM       Subjective     Orientation: Oriented x4    Chief Complaint: none      Vitals   Vitals at Rest  BP    HR    O2 Sat    Pain 5/10 in back       Respiratory  Status: Room air    Patients cultural, spiritual, Christianity conflicts given the current situation: no       Objective:     Patient found up in chair with peripheral IV  and chair check upon OT entry to room.    Mobility   Patient completed:  Pt remained in wc for duration of session    Functional Mobility  Pt propelled wc room 407<>therapy gym using BUEs    Limiting Factors for ADLs: motor, psychsocial, endurance, limited ROM, balance, weakness, perceptual, coordination, safety awareness, and pain         Therapeutic Exercise  Using 2# dowel 1x15 : chest press, bicep curl, wrist curls, forward/back rows, wrist pro/sup.         LifeStyle Change and Education:             Patient left up in chair with all lines intact, call button in reach, and chair alarm on.     Education provided: Roles and goals of OT and safety precautions    Multidisciplinary Problems       Occupational Therapy Goals          Problem: Occupational Therapy    Goal Priority Disciplines Outcome Interventions   Occupational Therapy Goal     OT, PT/OT Progressing    Description: STG's to review at Re-eval:    Patient will demonstrate improved independence with upper body dressing while efficiently donning shirt and LSO brace with SPV.  Patient will demonstrate improved energy conservation and safety awareness while utilizing safety strategies taught during ADLs and transfers with Min cueing.  Patient will perform lower body dressing with use of AE prn and SPV.  Patient will perform toilet hygiene with CGA while maintaining spinal precautions and use of RW/grab bars as needed.  Patient will perform tub transfer with SBA and use of RW.   Patient will perform showering with SPV for safety while maintaining precautions and using TTB and grab bars as needed.                        Time Tracking     OT Received On: 09/07/24  Time In 1000     Time Out 1030  Total Time 30 min  Therapy Time: OT Individual: 30  Missed Time:    Missed Time Reason:       Billable Minutes: Therapeutic Exercise 30    09/07/2024

## 2024-09-07 NOTE — NURSING
Nurses Note -- 4 Eyes      9/6/2024   9:29 PM      Skin assessed during: Q Shift Change      [] No Altered Skin Integrity Present    []Prevention Measures Documented      [x] Yes- Altered Skin Integrity Present or Discovered   [] LDA Added if Not in Epic (Describe Wound)   [] New Altered Skin Integrity was Present on Admit and Documented in LDA   [] Wound Image Taken    Wound Care Consulted? No    Attending Nurse:  Yudy Santoro RN/Staff Member:  Kim SEWELL   Surgical incision present

## 2024-09-07 NOTE — PT/OT/SLP PROGRESS
Occupational Therapy Inpatient Rehab Treatment    Name: Lionel Nix  MRN: [id number]    Assessment:  Lionel Nix is a 59 y.o. male admitted with a medical diagnosis of S/P lumbar spinal fusion.  He presents with the following impairments/functional limitations:  weakness, impaired endurance, impaired self care skills, impaired functional mobility, gait instability, impaired balance, impaired cognition, decreased safety awareness, pain, orthopedic precautions .    General Precautions: Standard, fall     Orthopedic Precautions:spinal precautions     Braces: LSO    Rehab Prognosis: Good; patient would benefit from acute skilled OT services to address these deficits and reach maximum level of function.      History:     Past Medical History:   Diagnosis Date    Anxiety     Chronic right-sided low back pain     Enlarged prostate     Hyperlipidemia     Hypertension     Lumbosacral radiculopathy due to degenerative joint disease of spine     Neuritis or radiculitis due to rupture of lumbar intervertebral disc     Numbness and tingling of both feet     Numbness and tingling of both legs     Obesity     Other spondylosis with radiculopathy, lumbar region        Past Surgical History:   Procedure Laterality Date    BACK SURGERY      COLONOSCOPY      HERNIA REPAIR      SHOULDER SURGERY Left     TRANSFORAMINAL LUMBAR INTERBODY FUSION (TLIF) USING COMPUTER-ASSISTED NAVIGATION Left 8/19/2024    Procedure: FUSION, SPINE, LUMBAR, TLIF, USING COMPUTER-ASSISTED NAVIGATION;  Surgeon: Car August MD;  Location: [facility];  Service: Neurosurgery;  Laterality: Left;  L2 - S1 TRANSFORAMINAL INTERBODY FUSION / POSTERIOR LATERAL FUSION // PRONE PEDRO // DRILL // MICROSCOPE // O-ARM // DIRECT SPINE // NDM       Subjective     Orientation: Oriented x4    Chief Complaint: back pain    Patient/Family Comments/goals:     Vitals   Vitals at Rest  BP    HR    O2 Sat    Pain 5/10 in back, received medication from nurse during session        Respiratory Status: Room air    Patients cultural, spiritual, Mandaen conflicts given the current situation: no       Objective:     Patient found up in chair with peripheral IV  upon OT entry to room.    Mobility   Patient completed:  Sit to Stand Transfer with contact guard assistance with rolling walker  Stand to Sit Transfer with contact guard assistance with rolling walker    Note- pt also impulsively stood from wc without locking breaks or using AD x2 trials during session. Required max cues for safety and redirection to task. Pt easily distracted and easily agitated this am.      Functional Mobility  Pt ambulated in/out BR c/ rolling walker with CGA. Max cues for safety    ADLs   Current Status   Toileting Hygiene 4 pt voided while standing over toilet     Limiting Factors for ADLs: motor, endurance, limited ROM, balance, weakness, perceptual, cognition, safety awareness, and pain         Therapeutic Activities  Pt completed dyn standing balance activity at tabletop while reaching overhead- tolerated standing 7min with CGA for safety    Therapeutic Exercise  UB bike 2x5 forward and reverse. Rest break taken for recovery      LifeStyle Change and Education:             Patient left up in chair with all lines intact and PTASeble present.     Education provided: Roles and goals of OT, body mechanics, safety precautions, and fall prevention    Multidisciplinary Problems       Occupational Therapy Goals          Problem: Occupational Therapy    Goal Priority Disciplines Outcome Interventions   Occupational Therapy Goal     OT, PT/OT Progressing    Description: STG's to review at Re-eval:    Patient will demonstrate improved independence with upper body dressing while efficiently donning shirt and LSO brace with SPV.  Patient will demonstrate improved energy conservation and safety awareness while utilizing safety strategies taught during ADLs and transfers with Min cueing.  Patient will perform lower  body dressing with use of AE prn and SPV.  Patient will perform toilet hygiene with CGA while maintaining spinal precautions and use of RW/grab bars as needed.  Patient will perform tub transfer with SBA and use of RW.   Patient will perform showering with SPV for safety while maintaining precautions and using TTB and grab bars as needed.                        Time Tracking     OT Received On: 09/07/24  Time In 0730     Time Out 0830  Total Time 60 min  Therapy Time: OT Individual: 60  Missed Time:    Missed Time Reason:      Billable Minutes: Therapeutic Activity 45 and Therapeutic Exercise 15    09/07/2024

## 2024-09-07 NOTE — PT/OT/SLP PROGRESS
Physical Therapy Inpatient Rehab Treatment    Patient Name:  Lionel Nix   MRN:  [id number]    Recommendations:     Discharge Recommendations:  Low Intensity Therapy   Discharge Equipment Recommendations:     Barriers to discharge: Increased level of assist, Ongoing medical treatment, Impaired functional mobility , and Severity of Deficits     Assessment:     Lionel Nix is a 59 y.o. male admitted with a medical diagnosis of S/P lumbar spinal fusion.  He presents with the following impairments/functional limitations:  weakness, impaired endurance, impaired functional mobility, gait instability, impaired balance, decreased safety awareness, pain, orthopedic precautions .    Rehab Diagnosis: Severe lumbar stenosis with radiculopathy and spondylosis s/p TLIF L2-S1 decompression and fusion 8/19/2024. Pt. Has a past medical history of HTN, HLD, severe lumbar stenosis with lumbosacral radiculopathy and degenerative joint disease of the lumbar spine.    Recent Surgery: * No surgery found *      General Precautions: Standard, fall     Orthopedic Precautions:spinal precautions     Braces: LSO    Rehab Prognosis: Fair; patient would benefit from acute skilled PT services to address these deficits and reach maximum level of function.      History:     Past Medical History:   Diagnosis Date    Anxiety     Chronic right-sided low back pain     Enlarged prostate     Hyperlipidemia     Hypertension     Lumbosacral radiculopathy due to degenerative joint disease of spine     Neuritis or radiculitis due to rupture of lumbar intervertebral disc     Numbness and tingling of both feet     Numbness and tingling of both legs     Obesity     Other spondylosis with radiculopathy, lumbar region        Past Surgical History:   Procedure Laterality Date    BACK SURGERY      COLONOSCOPY      HERNIA REPAIR      SHOULDER SURGERY Left     TRANSFORAMINAL LUMBAR INTERBODY FUSION (TLIF) USING COMPUTER-ASSISTED NAVIGATION Left 8/19/2024     "Procedure: FUSION, SPINE, LUMBAR, TLIF, USING COMPUTER-ASSISTED NAVIGATION;  Surgeon: Car August MD;  Location: [facility];  Service: Neurosurgery;  Laterality: Left;  L2 - S1 TRANSFORAMINAL INTERBODY FUSION / POSTERIOR LATERAL FUSION // PRONE PEDRO // DRILL // MICROSCOPE // O-ARM // DIRECT SPINE // NDM       Subjective     Chief Complaint: Pt requesting time to find "laundry pick-up services". Chief c/o 15/10 pain "all over" at beginning of session with ALBAN Alvarez in to administer medication    Respiratory Status: Room air    Patients cultural, spiritual, Presybeterian conflicts given the current situation:        Objective:     Patient found up in chair with peripheral IV  upon PT entry to room.    Pt is  Alert, Cooperative, Easily distractible, Agitated, and Impulsive.    Vitals   Vitals at Rest  BP    HR    O2 Sat    Pain      Vitals With Activity  BP    HR    O2 Sat    Pain        Functional Mobility:   Transfers:     Sit to Stand: Supervision or touching assistance  with no AD  Bed to Chair: Supervision or touching assistance  with no AD  using  Stand Pivot  Toilet Transfer: Supervision or touching assistance  with  grab bars  using  Stand Pivot     Current   Status  Discharge   Goal   Functional Area: Care Score:    Roll Left and Right   Independent   Sit to Lying   Independent   Lying to Sitting on Side of Bed   Independent   Sit to Stand 4 Independent   Chair/Bed-to-Chair Transfer 4 Independent   Car Transfer   Independent   Walk 10 Feet   Independent   Walk 50 Feet with Two Turns   Independent   Walk 150 Feet   Independent   Walk 10 Feet Uneven Surface   Independent   1 Step (Curb)   Independent   4 Steps   Independent   12 Steps   Supervision or touching assistance   Picking Up Object   Independent   Wheel 50 Feet with Two Turns 6 Not applicable   Wheel 150 Feet 6 Not applicable       Therapeutic Activities and Exercises:  Self-propulsion of w/c for 150 ft with BUE  Seated EOM BLE there-ex 3 x 10: ankle " pumps, LAQ, marching, hip abd./add. and hams. Curls with RTB  Requesting to go outside: self-propulsion of w/c with BUE for 150 ft x 2 level tile then 200 ft over uneven concrete indep. To increase activity tolerance.    Activity Tolerance: Good    Patient left up in chair with all lines intact, call button in reach, and CNA present.    Education provided: roles and goals of PT/PTA, transfer training, safety awareness, body mechanics, fall prevention, and spinal precautions    Expected compliance: Low compliance    GOALS:   Multidisciplinary Problems       Physical Therapy Goals          Problem: Physical Therapy    Goal Priority Disciplines Outcome Goal Variances Interventions   Physical Therapy Goal     PT, PT/OT Progressing     Description: Bed Mobility:  Roll left and right independently.   Sit to supine transfer independently.   Supine to sit transfer independently.     Transfers:  Sit to stand transfer independently using RW.   Bed to chair transfer independently Stand Step  using RW.   Car transfer with supervision/touching assist using RW.    an object from the ground in standing position with supervision/touching assist using RW.     Mobility:  Ambulate 150 feet with supervision/touching assist using RW.  Ambulate 10 feet on uneven surfaces/ramps with supervision/touching assist using RW.   Pt ascended/descended a 4 inch curb with supervision/touching assist using RW.   Ascend/descend 4 stairs with supervision/touching assist using bilateral handrails.                          Plan:     During this hospitalization, patient to be seen 5 x/week (5-7x) to address the identified rehab impairments via gait training, therapeutic activities, therapeutic exercises, neuromuscular re-education and progress toward the following goals:    Plan of Care Expires:     PT Next Visit Date: 09/11/24  Plan of Care reviewed with: patient    Additional Information:         Time Tracking:     Therapy Time  PT Start Time:  0830  PT Stop Time: 0950  PT Total Time (min): 80 min   PT Individual: 80  Missed Time: 10 Minutes  Time Missed due to: Increased agitation, Other (Comment) (pt threw phone and began shouting)      Billable Minutes: Therapeutic Activity 50 and Therapeutic Exercise 30    09/07/2024

## 2024-09-07 NOTE — PLAN OF CARE
Problem: Rehabilitation ([facility]) Plan of Care  Goal: Plan of Care Review  9/7/2024 0052 by Yudy Patel LPN  Outcome: Progressing  9/7/2024 0052 by Yudy Patel LPN  Outcome: Progressing  Goal: Patient-Specific Goal (Individualized)  9/7/2024 0052 by Yudy Patel LPN  Outcome: Progressing  9/7/2024 0052 by Yudy Patel LPN  Outcome: Progressing  Goal: Absence of New-Onset Illness or Injury  9/7/2024 0052 by Yudy Patel LPN  Outcome: Progressing  9/7/2024 0052 by Yudy Patel LPN  Outcome: Progressing  Goal: Optimal Comfort and Wellbeing  9/7/2024 0052 by Yudy Patel LPN  Outcome: Progressing  9/7/2024 0052 by Yudy Patel LPN  Outcome: Progressing  Goal: Home and Community Transition Plan Established  9/7/2024 0052 by Yudy Patel LPN  Outcome: Progressing  9/7/2024 0052 by Yudy Patel LPN  Outcome: Progressing     Problem: Fall Injury Risk  Goal: Absence of Fall and Fall-Related Injury  9/7/2024 0052 by Yudy Patel LPN  Outcome: Progressing  9/7/2024 0052 by Yudy Patel LPN  Outcome: Progressing     Problem: Pain Acute  Goal: Optimal Pain Control and Function  9/7/2024 0052 by Yudy Patel LPN  Outcome: Progressing  9/7/2024 0052 by Yudy Patel LPN  Outcome: Progressing

## 2024-09-07 NOTE — PROGRESS NOTES
[facility] ([facility])  Rehab Progress Note    Patient Name: Lionel Nix  MRN: [id number]  Age: 59 y.o. Sex: male  : 1965  Hospital Length of Stay: 3 days  Date of Service: 2024   Chief Complaint: Severe lumbar stenosis with radiculopathy and spondylosis s/p TLIF L2-S1 decompression and L3-S1 fusion on      Subjective:     Basic Information  Admit Information: 59-year-old male presented to [facility] on  for scheduled lumbar decompression and fusion. PMH significant for hypertension, hyperlipidemia, anxiety, depression.  On  tolerated TLIF L2-S1 decompression and L3-S1 fusion without perioperative complications.  On  discontinued TRAVIS drain.  Postop CT scan with good placement of hardware.  Psychiatry evaluated on .  Continued Effexor, Seroquel, and initiated Trileptal.  Continued with postoperative pain.  Lyrica initiated along with increase in hypertensive medications.  Reported fall on .  MRI cervical spine with no acute traumatic findings identified and degenerative listhesis at C4-5 and C6-7.  No neurosurgical interventions planned for C-spine. Tolerated transfer to [facility] inpatient rehab unit on  without incident.   Today's Information: No acute events overnight.  Sitting in chair comfortably.  Reports good sleep and appetite.  Last BM .  Complains of lower back and neck pain after fall yesterday.  Vital signs at goal with no recent recorded fevers.  No labs or imaging today.      Review of patient's allergies indicates:   Allergen Reactions    Gabapentin Anxiety and Palpitations        Current Facility-Administered Medications:     acetaminophen tablet 650 mg, 650 mg, Oral, TID, Kelsey Gonsalez A, FNP, 650 mg at 24    acetaminophen tablet 650 mg, 650 mg, Oral, Q8H PRN, Kelsey Gonsalez A, FNP    atorvastatin tablet 40 mg, 40 mg, Oral, QHS, Carter Jha, FNP, 40 mg at 24    baclofen tablet 10 mg, 10 mg, Oral, TID,  Alivia Garcia, FNP, 10 mg at 09/06/24 2109    benzonatate capsule 100 mg, 100 mg, Oral, TID PRN, Carter Jha A, FNP    carvediloL tablet 12.5 mg, 12.5 mg, Oral, BID, Alivia Garcia B, FNP, 12.5 mg at 09/06/24 2109    clonazePAM tablet 1 mg, 1 mg, Oral, BID PRN, Kelsey Gonsalez, FNP, 1 mg at 09/06/24 2231    dexAMETHasone tablet 2 mg, 2 mg, Oral, Q12H, Alivia Garcia, FNP, 2 mg at 09/06/24 2109    diazePAM tablet 5 mg, 5 mg, Oral, Q12H PRN, Alivia Garcia, FNP, 5 mg at 09/06/24 1610    docusate sodium capsule 100 mg, 100 mg, Oral, BID, Carter Jha, FNP, 100 mg at 09/06/24 2109    enoxaparin injection 40 mg, 40 mg, Subcutaneous, Q24H (prophylaxis, 1700), Carter Jha A, FNP, 40 mg at 09/06/24 1610    hydrALAZINE injection 10 mg, 10 mg, Intravenous, Q4H PRN, Carter Jha, FNP    hydroCHLOROthiazide tablet 25 mg, 25 mg, Oral, Daily, Alivia Garcia B, FNP, 25 mg at 09/06/24 0804    HYDROcodone-acetaminophen 5-325 mg per tablet 1 tablet, 1 tablet, Oral, Daily PRN, Carter Jha, FNP, 1 tablet at 09/06/24 0802    hydrOXYzine pamoate capsule 50 mg, 50 mg, Oral, Nightly PRN, Carter Jha A, FNP    labetalol 20 mg/4 mL (5 mg/mL) IV syring, 10 mg, Intravenous, Q4H PRN, Carter Jha A, FNP, 10 mg at 09/06/24 2149    lisinopriL tablet 40 mg, 40 mg, Oral, Daily, Alivia Garcia, FNP, 40 mg at 09/06/24 0804    metoprolol injection 10 mg, 10 mg, Intravenous, Q2H PRN, Carter Jha, FNP    nitroGLYCERIN SL tablet 0.4 mg, 0.4 mg, Sublingual, Q5 Min PRN, Carter Jha, ECHOP    ondansetron disintegrating tablet 8 mg, 8 mg, Oral, Q8H PRN, Carter Jha, FNP    ondansetron injection 4 mg, 4 mg, Intravenous, Q8H PRN, Carter Jha, FNP    OXcarbazepine tablet 150 mg, 150 mg, Oral, BID, Carter Jha FNP, 150 mg at 09/06/24 2109    oxyCODONE immediate release tablet 10 mg, 10 mg, Oral, Q4H PRN, Kelsey Gonsalez FNP, 10 mg at 09/06/24  "2231    oxyCODONE immediate release tablet 5 mg, 5 mg, Oral, Q4H PRN, Kelsey Gonsalez FNP    polyethylene glycol packet 17 g, 17 g, Oral, BID PRN, Carter Jha FNP    pregabalin capsule 25 mg, 25 mg, Oral, Before breakfast, Kelsey Gonsalez, FNP, 25 mg at 09/06/24 0804    pregabalin capsule 75 mg, 75 mg, Oral, QHS, Alivia Garcia FNP, 75 mg at 09/06/24 2109    QUEtiapine tablet 300 mg, 300 mg, Oral, Nightly, Carter Jha, FNP, 300 mg at 09/06/24 2109    tamsulosin 24 hr capsule 0.4 mg, 0.4 mg, Oral, QHS, Carter Jha, FNP, 0.4 mg at 09/06/24 2109    venlafaxine 24 hr capsule 300 mg, 300 mg, Oral, Daily, Carter Jha, FNP, 300 mg at 09/06/24 0804    vitamin renal formula (B-complex-vitamin c-folic acid) 1 mg per capsule 1 capsule, 1 capsule, Oral, Daily, Kelsey Gonsalez FNP, 1 capsule at 09/06/24 0804     Review of Systems   Complete 12-point review of symptoms negative except for what's mentioned in HPI     Objective:     BP 99/64   Pulse 74   Temp 98.6 °F (37 °C) (Oral)   Resp 20   Ht 5' 8" (1.727 m)   Wt 107.4 kg (236 lb 12.8 oz)   SpO2 97%   BMI 36.01 kg/m²        Physical Exam  Vitals reviewed.   Eyes:      Pupils: Pupils are equal, round, and reactive to light.   Cardiovascular:      Rate and Rhythm: Normal rate and regular rhythm.      Heart sounds: Normal heart sounds.   Pulmonary:      Effort: Pulmonary effort is normal.      Breath sounds: Normal breath sounds.   Abdominal:      General: Bowel sounds are normal.   Musculoskeletal:         General: Normal range of motion.   Skin:     General: Skin is warm.      Comments: Lumbar surgical incision dry and intact    Neurological:      General: No focal deficit present.      Mental Status: He is alert and oriented to person, place, and time.      Motor: Weakness present.   Psychiatric:         Mood and Affect: Mood normal.       Lines/Drains/Airways       Peripheral Intravenous Line  Duration                  " Peripheral IV - Single Lumen 08/24/24 1717 20 G No Anterior;Right Forearm 13 days                    Labs  Admission on 09/04/2024   Component Date Value Ref Range Status    Sodium 09/05/2024 134 (L)  136 - 145 mmol/L Final    Potassium 09/05/2024 4.4  3.5 - 5.1 mmol/L Final    Chloride 09/05/2024 102  98 - 107 mmol/L Final    CO2 09/05/2024 25  23 - 31 mmol/L Final    Glucose 09/05/2024 110  82 - 115 mg/dL Final    Blood Urea Nitrogen 09/05/2024 15.7  8.4 - 25.7 mg/dL Final    Creatinine 09/05/2024 0.84  0.73 - 1.18 mg/dL Final    Calcium 09/05/2024 8.5 (L)  8.8 - 10.0 mg/dL Final    Protein Total 09/05/2024 5.8  5.8 - 7.6 gm/dL Final    Albumin 09/05/2024 3.0 (L)  3.4 - 4.8 g/dL Final    Globulin 09/05/2024 2.8  2.4 - 3.5 gm/dL Final    Albumin/Globulin Ratio 09/05/2024 1.1  1.1 - 2.0 ratio Final    Bilirubin Total 09/05/2024 0.4  <=1.5 mg/dL Final    ALP 09/05/2024 118  40 - 150 unit/L Final    ALT 09/05/2024 23  0 - 55 unit/L Final    AST 09/05/2024 16  5 - 34 unit/L Final    eGFR 09/05/2024 >60  mL/min/1.73/m2 Final    Anion Gap 09/05/2024 7.0  mEq/L Final    BUN/Creatinine Ratio 09/05/2024 19   Final    Magnesium Level 09/05/2024 2.20  1.60 - 2.60 mg/dL Final    Prealbumin 09/05/2024 35.3  16.0 - 42.0 mg/dL Final    Phosphorus Level 09/05/2024 3.5  2.3 - 4.7 mg/dL Final    Ferritin Level 09/05/2024 48.77  21.81 - 274.66 ng/mL Final    Iron Binding Capacity Unsaturated 09/05/2024 236  69 - 240 ug/dL Final    Iron Level 09/05/2024 36 (L)  65 - 175 ug/dL Final    Transferrin 09/05/2024 247  163 - 344 mg/dL Final    Iron Binding Capacity Total 09/05/2024 272  250 - 450 ug/dL Final    Iron Saturation 09/05/2024 13 (L)  20 - 50 % Final    WBC 09/05/2024 7.04  4.50 - 11.50 x10(3)/mcL Final    RBC 09/05/2024 3.46 (L)  4.70 - 6.10 x10(6)/mcL Final    Hgb 09/05/2024 10.3 (L)  14.0 - 18.0 g/dL Final    Hct 09/05/2024 31.1 (L)  42.0 - 52.0 % Final    MCV 09/05/2024 89.9  80.0 - 94.0 fL Final    MCH 09/05/2024 29.8  27.0 -  31.0 pg Final    MCHC 09/05/2024 33.1  33.0 - 36.0 g/dL Final    RDW 09/05/2024 12.5  11.5 - 17.0 % Final    Platelet 09/05/2024 253  130 - 400 x10(3)/mcL Final    MPV 09/05/2024 8.4  7.4 - 10.4 fL Final    Neut % 09/05/2024 71.3  % Final    Lymph % 09/05/2024 18.0  % Final    Mono % 09/05/2024 9.5  % Final    Eos % 09/05/2024 0.1  % Final    Basophil % 09/05/2024 0.0  % Final    Lymph # 09/05/2024 1.27  0.6 - 4.6 x10(3)/mcL Final    Neut # 09/05/2024 5.01  2.1 - 9.2 x10(3)/mcL Final    Mono # 09/05/2024 0.67  0.1 - 1.3 x10(3)/mcL Final    Eos # 09/05/2024 0.01  0 - 0.9 x10(3)/mcL Final    Baso # 09/05/2024 0.00  <=0.2 x10(3)/mcL Final    IG# 09/05/2024 0.08 (H)  0 - 0.04 x10(3)/mcL Final    IG% 09/05/2024 1.1  % Final    NRBC% 09/05/2024 0.0  % Final     Radiology  MRI cervical spine 9/4: Impression: No acute traumatic findings identified. Degenerative listhesis at C4-5 and C6-7.   Radiology  X-ray lumbar spine 8/26: Impression: Postoperative changes of posterior fusion with intact hardware     Assessment/Plan:     59 y.o. WM admitted on 9/4/2024     Severe lumbar stenosis with radiculopathy and spondylosis   - s/p TLIF L2-S1 decompression and L3-S1 fusion on 08/19  - LSO brace when OOB, spinal precautions  - initiate Toradol 30 mg IVP x1 dose on 09/07  - continue                 Dexamethasone 2 mg b.i.d. (continue taper)                 Lyrica 75 mg b.i.d. (initiated 9/7)                 Baclofen 10 mg t.i.d.   - follow-up with Neurosurgery outpatient     Bipolar disorder  - mood stable  - continue                 Venlafaxine 300 mg daily                 Oxcarbazepine 150 mg b.i.d.                  Seroquel 300 mg at night    Valium 5 mg b.i.d. p.r.n. anxiety (initiated 9/6)  - follow-up with psychiatrist outpatient     HLD  - FLP outpatient  - continue                Atorvastatin 40 mg at night      HTN  - blood pressure at goal  - continue                Coreg 12.5 mg b.i.d.                  Hydrochlorothiazide  25 mg daily                  Lisinopril 40 mg daily                 Hydralazine 10 mg every 2 hours as needed for BP > 160/90                Labetalol 10 mg every 2 hours as needed for BP > 160/90  - low sodium diet     Constipation  - stable  - continue                 Colace 100 mg b.i.d.      BPH  - stable  - continue                 Tamsulosin 0.4 mg at night     VTE Prophylaxis:  Lovenox 40 mg daily     POA: no  Living will: no  Contacts: Clemencia Nix (daughter) [phone]                     Dee Dee (sister) [phone]     CODE STATUS: Full  Internal Medicine (attending): Thor King MD  Physiatry (consulting):  Devang Cheung MD     OUTPATIENT PROVIDERS  PCP: Rafy Poole MD   Neurosurgery:  Car August MD   Psychiatry: Josef Suttno MD in [place], [place]     DISPOSITION:  Sleep hygiene, bowel maintenance, and appetite at goal.  Vital signs at goal with no recorded fevers.  No new labs or imaging today.  Complains of cough, neck pain, and back pain.  He did have a fall yesterday.  Obtain lumbar and cervical XR.  Increase Lyrica 75 mg b.i.d. and initiate Toradol 30 mg IVP x1 dose now.  Continues on Decadron b.i.d..  Continue aggressive mobilization as tolerated.  Monitor closely.  Notify of acute changes.     Total time spent on this encounter including chart review and direct 1-on-1 patient interaction: 54 minutes   Over 50% of this time was spent in counseling and coordination of care

## 2024-09-08 LAB
POCT GLUCOSE: 105 MG/DL (ref 70–110)
POCT GLUCOSE: 369 MG/DL (ref 70–110)
POCT GLUCOSE: 86 MG/DL (ref 70–110)

## 2024-09-08 PROCEDURE — 94761 N-INVAS EAR/PLS OXIMETRY MLT: CPT

## 2024-09-08 PROCEDURE — 25000003 PHARM REV CODE 250: Performed by: NURSE PRACTITIONER

## 2024-09-08 PROCEDURE — 97116 GAIT TRAINING THERAPY: CPT | Mod: CQ

## 2024-09-08 PROCEDURE — 99900031 HC PATIENT EDUCATION (STAT)

## 2024-09-08 PROCEDURE — 97530 THERAPEUTIC ACTIVITIES: CPT

## 2024-09-08 PROCEDURE — 11800000 HC REHAB PRIVATE ROOM

## 2024-09-08 PROCEDURE — 94799 UNLISTED PULMONARY SVC/PX: CPT

## 2024-09-08 PROCEDURE — 63600175 PHARM REV CODE 636 W HCPCS: Performed by: NURSE PRACTITIONER

## 2024-09-08 PROCEDURE — 97110 THERAPEUTIC EXERCISES: CPT

## 2024-09-08 PROCEDURE — 99900035 HC TECH TIME PER 15 MIN (STAT)

## 2024-09-08 RX ORDER — GUAIFENESIN 100 MG/5ML
200 SOLUTION ORAL EVERY 4 HOURS
Status: COMPLETED | OUTPATIENT
Start: 2024-09-08 | End: 2024-09-08

## 2024-09-08 RX ADMIN — TAMSULOSIN HYDROCHLORIDE 0.4 MG: 0.4 CAPSULE ORAL at 09:09

## 2024-09-08 RX ADMIN — OXCARBAZEPINE 150 MG: 150 TABLET, FILM COATED ORAL at 09:09

## 2024-09-08 RX ADMIN — DEXAMETHASONE 2 MG: 2 TABLET ORAL at 09:09

## 2024-09-08 RX ADMIN — DEXAMETHASONE 2 MG: 2 TABLET ORAL at 08:09

## 2024-09-08 RX ADMIN — ACETAMINOPHEN 650 MG: 325 TABLET ORAL at 06:09

## 2024-09-08 RX ADMIN — PREGABALIN 75 MG: 75 CAPSULE ORAL at 09:09

## 2024-09-08 RX ADMIN — BACLOFEN 10 MG: 5 TABLET ORAL at 09:09

## 2024-09-08 RX ADMIN — DOCUSATE SODIUM 100 MG: 100 CAPSULE, LIQUID FILLED ORAL at 08:09

## 2024-09-08 RX ADMIN — GUAIFENESIN 200 MG: 200 SOLUTION ORAL at 01:09

## 2024-09-08 RX ADMIN — VENLAFAXINE HYDROCHLORIDE 300 MG: 150 CAPSULE, EXTENDED RELEASE ORAL at 08:09

## 2024-09-08 RX ADMIN — OXYCODONE HYDROCHLORIDE 10 MG: 5 TABLET ORAL at 12:09

## 2024-09-08 RX ADMIN — GUAIFENESIN 200 MG: 200 SOLUTION ORAL at 12:09

## 2024-09-08 RX ADMIN — BENZONATATE 100 MG: 100 CAPSULE ORAL at 08:09

## 2024-09-08 RX ADMIN — BACLOFEN 10 MG: 5 TABLET ORAL at 01:09

## 2024-09-08 RX ADMIN — GUAIFENESIN 200 MG: 200 SOLUTION ORAL at 04:09

## 2024-09-08 RX ADMIN — OXYCODONE HYDROCHLORIDE 10 MG: 5 TABLET ORAL at 03:09

## 2024-09-08 RX ADMIN — DOCUSATE SODIUM 100 MG: 100 CAPSULE, LIQUID FILLED ORAL at 09:09

## 2024-09-08 RX ADMIN — HYDROCHLOROTHIAZIDE 25 MG: 25 TABLET ORAL at 08:09

## 2024-09-08 RX ADMIN — BACLOFEN 10 MG: 5 TABLET ORAL at 06:09

## 2024-09-08 RX ADMIN — DIAZEPAM 5 MG: 5 TABLET ORAL at 09:09

## 2024-09-08 RX ADMIN — OXYCODONE HYDROCHLORIDE 10 MG: 5 TABLET ORAL at 09:09

## 2024-09-08 RX ADMIN — ENOXAPARIN SODIUM 40 MG: 40 INJECTION SUBCUTANEOUS at 04:09

## 2024-09-08 RX ADMIN — Medication 1 CAPSULE: at 08:09

## 2024-09-08 RX ADMIN — QUETIAPINE 300 MG: 100 TABLET ORAL at 09:09

## 2024-09-08 RX ADMIN — OXYCODONE HYDROCHLORIDE 10 MG: 5 TABLET ORAL at 04:09

## 2024-09-08 RX ADMIN — PREGABALIN 75 MG: 75 CAPSULE ORAL at 08:09

## 2024-09-08 RX ADMIN — BENZONATATE 100 MG: 100 CAPSULE ORAL at 09:09

## 2024-09-08 RX ADMIN — CARVEDILOL 12.5 MG: 6.25 TABLET, FILM COATED ORAL at 09:09

## 2024-09-08 RX ADMIN — CARVEDILOL 12.5 MG: 6.25 TABLET, FILM COATED ORAL at 08:09

## 2024-09-08 RX ADMIN — OXCARBAZEPINE 150 MG: 150 TABLET, FILM COATED ORAL at 08:09

## 2024-09-08 RX ADMIN — ACETAMINOPHEN 650 MG: 325 TABLET ORAL at 01:09

## 2024-09-08 RX ADMIN — LISINOPRIL 40 MG: 10 TABLET ORAL at 08:09

## 2024-09-08 RX ADMIN — ATORVASTATIN CALCIUM 40 MG: 40 TABLET, FILM COATED ORAL at 09:09

## 2024-09-08 RX ADMIN — HYDROCODONE BITARTRATE AND ACETAMINOPHEN 1 TABLET: 5; 325 TABLET ORAL at 08:09

## 2024-09-08 NOTE — PT/OT/SLP PROGRESS
Physical Therapy Inpatient Rehab Treatment    Patient Name:  Lionel Nix   MRN:  [id number]    Recommendations:     Discharge Recommendations:  Low Intensity Therapy   Discharge Equipment Recommendations:     Barriers to discharge:  Decreased functional mobility, Anxiety issues    Assessment:     Lionel Nix is a 59 y.o. male admitted with a medical diagnosis of S/P lumbar spinal fusion.  He presents with the following impairments/functional limitations:  weakness, impaired endurance, impaired self care skills, impaired functional mobility, gait instability, impaired balance, decreased lower extremity function, decreased safety awareness, pain.    Rehab Diagnosis: Severe lumbar stenosis with radiculopathy and spondylosis s/p TLIF L2-S1 decompression and fusion 8/19/2024. Pt. Has a past medical history of HTN, HLD, severe lumbar stenosis with lumbosacral radiculopathy and degenerative joint disease of the lumbar spine.    General Precautions: Standard, fall     Orthopedic Precautions:spinal precautions     Braces: LSO    Rehab Prognosis: Good; patient would benefit from acute skilled PT services to address these deficits and reach maximum level of function.      History:     Past Medical History:   Diagnosis Date    Anxiety     Chronic right-sided low back pain     Enlarged prostate     Hyperlipidemia     Hypertension     Lumbosacral radiculopathy due to degenerative joint disease of spine     Neuritis or radiculitis due to rupture of lumbar intervertebral disc     Numbness and tingling of both feet     Numbness and tingling of both legs     Obesity     Other spondylosis with radiculopathy, lumbar region        Past Surgical History:   Procedure Laterality Date    BACK SURGERY      COLONOSCOPY      HERNIA REPAIR      SHOULDER SURGERY Left     TRANSFORAMINAL LUMBAR INTERBODY FUSION (TLIF) USING COMPUTER-ASSISTED NAVIGATION Left 8/19/2024    Procedure: FUSION, SPINE, LUMBAR, TLIF, USING COMPUTER-ASSISTED NAVIGATION;   "Surgeon: Car August MD;  Location: [facility];  Service: Neurosurgery;  Laterality: Left;  L2 - S1 TRANSFORAMINAL INTERBODY FUSION / POSTERIOR LATERAL FUSION // PRONE PEDRO // DRILL // MICROSCOPE // O-ARM // DIRECT SPINE // NDM       Subjective     Chief Complaint: "I am hurting a lot even with medicine."    Respiratory Status: Room air    Patients cultural, spiritual, Christianity conflicts given the current situation: no      Objective:     Communicated with nurse prior to session.  Patient found up in chair with peripheral IV  upon PT entry to room.    Pt is Oriented x3 and  Manipulative, Alert, Cooperative, and Easily distractible.    Functional Mobility:      Current   Status  Discharge   Goal   Functional Area: Care Score:    Sit to Stand 4  With RW with CGA Independent   Chair/Bed-to-Chair Transfer 4  With RW with CGA Independent   Walk 10 Feet 1  Within the parallel bars - CGA with 1-person but total assist secondary to need for 2nd person to follow with wheelchair due to patient's anxiety issues Independent   Picking Up Object 4  With grabber with [place] Independent   Wheel 50 Feet with Two Turns 5   Not applicable   Wheel 150 Feet 5 Not applicable       Therapeutic Activities and Exercises:  Standing exercises done in the parallel bars in the form of marching in place, toe/heel raises, and step forward and backward x 15 reps each with CGA secondary to knee giving way episodes. Partial knee bends attempted however patient refused. Knee giving way episodes seems to be done by patient on purpose to get out of certain exercises. Patient is manipulative and requires a lot of encouragement. Patient complained of pain and the nurse was informed. Patient refused the last 15 minutes of therapy due to increased pain and pain med not scheduled until an hour after.    Activity Tolerance: Fair    Patient left up in chair with all lines intact, call button in reach, chair alarm on, and nurse notified.    Education " provided: roles and goals of PT/PTA, transfer training, gait training, balance training, safety awareness, body mechanics, wheelchair management, strengthening exercises, and fall prevention    Expected compliance: Moderate compliance    GOALS:   Multidisciplinary Problems       Physical Therapy Goals          Problem: Physical Therapy    Goal Priority Disciplines Outcome Goal Variances Interventions   Physical Therapy Goal     PT, PT/OT Progressing     Description: Bed Mobility:  Roll left and right independently.   Sit to supine transfer independently.   Supine to sit transfer independently.     Transfers:  Sit to stand transfer independently using RW.   Bed to chair transfer independently Stand Step  using RW.   Car transfer with supervision/touching assist using RW.    an object from the ground in standing position with supervision/touching assist using RW.     Mobility:  Ambulate 150 feet with supervision/touching assist using RW.  Ambulate 10 feet on uneven surfaces/ramps with supervision/touching assist using RW.   Pt ascended/descended a 4 inch curb with supervision/touching assist using RW.   Ascend/descend 4 stairs with supervision/touching assist using bilateral handrails.                          Plan:     During this hospitalization, patient to be seen 5 x/week to address the identified rehab impairments via gait training, therapeutic activities, therapeutic exercises and progress toward the following goals:    Plan of Care Expires:  09/14/24  PT Next Visit Date: 09/11/24  Plan of Care reviewed with: patient    Additional Information:         Time Tracking:     Therapy Time  PT Received On: 09/08/24  PT Start Time: 1100  PT Stop Time: 1145  PT Total Time (min): 45 min   PT Individual: 45  Missed Time:    Time Missed due to:      Billable Minutes: Gait Training 15 and Therapeutic Exercise 30    09/08/2024

## 2024-09-08 NOTE — PROGRESS NOTES
[facility] ([facility])  Rehab Progress Note    Patient Name: Lionel Nix  MRN: [id number]  Age: 59 y.o. Sex: male  : 1965  Hospital Length of Stay: 4 days  Date of Service: 2024   Chief Complaint: Severe lumbar stenosis with radiculopathy and spondylosis s/p TLIF L2-S1 decompression and L3-S1 fusion on      Subjective:     Basic Information  Admit Information: 59-year-old male presented to [facility] on  for scheduled lumbar decompression and fusion. PMH significant for hypertension, hyperlipidemia, anxiety, depression.  On  tolerated TLIF L2-S1 decompression and L3-S1 fusion without perioperative complications.  On  discontinued TRAVIS drain.  Postop CT scan with good placement of hardware.  Psychiatry evaluated on .  Continued Effexor, Seroquel, and initiated Trileptal.  Continued with postoperative pain.  Lyrica initiated along with increase in hypertensive medications.  Reported fall on .  MRI cervical spine with no acute traumatic findings identified and degenerative listhesis at C4-5 and C6-7.  No neurosurgical interventions planned for C-spine. Tolerated transfer to [facility] inpatient rehab unit on  without incident.   Today's Information: No acute events overnight.  Sitting up in chair.  Reports good sleep and appetite.  Last BM .  Vital signs at goal with no recorded fevers.  Good glycemic control.  Lumbar XR with expected post surgical changes with no adverse interval change appreciated.  Cervical XR with no adverse change appreciated.    Review of patient's allergies indicates:   Allergen Reactions    Gabapentin Anxiety and Palpitations        Current Facility-Administered Medications:     acetaminophen tablet 650 mg, 650 mg, Oral, TID, Florence Gonsalezhryn A, FNP, 650 mg at 24 1353    acetaminophen tablet 650 mg, 650 mg, Oral, Q8H PRN, Kelsey Gonsalez A, FNP    atorvastatin tablet 40 mg, 40 mg, Oral, QHS, Carter Jha A, FNP, 40 mg at  09/07/24 2138    baclofen tablet 10 mg, 10 mg, Oral, TID, Alivia Garcia, FNP, 10 mg at 09/08/24 1353    benzonatate capsule 100 mg, 100 mg, Oral, TID PRN, Carter Jha A, FNP, 100 mg at 09/08/24 0838    carvediloL tablet 12.5 mg, 12.5 mg, Oral, BID, Alivia Garcia, FNP, 12.5 mg at 09/08/24 0827    clonazePAM tablet 1 mg, 1 mg, Oral, BID PRN, Kelsey Gonsalez, FNP, 1 mg at 09/07/24 2140    dexAMETHasone tablet 2 mg, 2 mg, Oral, Q12H, Alivia Garcia, FNP, 2 mg at 09/08/24 0827    diazePAM tablet 5 mg, 5 mg, Oral, Q12H PRN, Alivia Garcia, FNP, 5 mg at 09/06/24 1610    docusate sodium capsule 100 mg, 100 mg, Oral, BID, Carter Jha A, FNP, 100 mg at 09/08/24 0827    enoxaparin injection 40 mg, 40 mg, Subcutaneous, Q24H (prophylaxis, 1700), Carter Jha A, FNP, 40 mg at 09/08/24 1625    hydrALAZINE injection 10 mg, 10 mg, Intravenous, Q4H PRN, Carter Jha A, FNP    hydroCHLOROthiazide tablet 25 mg, 25 mg, Oral, Daily, Alivia Garcia, FNP, 25 mg at 09/08/24 0827    HYDROcodone-acetaminophen 5-325 mg per tablet 1 tablet, 1 tablet, Oral, Daily PRN, Jake Jhaothy A, FNP, 1 tablet at 09/08/24 0827    hydrOXYzine pamoate capsule 50 mg, 50 mg, Oral, Nightly PRN, Carter Jha A, FNP    labetalol 20 mg/4 mL (5 mg/mL) IV syring, 10 mg, Intravenous, Q4H PRN, Carter Jha A, FNP, 10 mg at 09/06/24 2149    lisinopriL tablet 40 mg, 40 mg, Oral, Daily, Alivia Garcia, MIGEL, 40 mg at 09/08/24 0827    metoprolol injection 10 mg, 10 mg, Intravenous, Q2H PRN, Carter Jha FNP    nitroGLYCERIN SL tablet 0.4 mg, 0.4 mg, Sublingual, Q5 Min PRN, Carter Jha FNP    ondansetron disintegrating tablet 8 mg, 8 mg, Oral, Q8H PRN, Carter Jha FNP    ondansetron injection 4 mg, 4 mg, Intravenous, Q8H PRN, Carter Jha FNP    OXcarbazepine tablet 150 mg, 150 mg, Oral, BID, Carter Jha FNP, 150 mg at 09/08/24 0827    oxyCODONE immediate release  "tablet 10 mg, 10 mg, Oral, Q4H PRN, [place] Kelsey A, FNP, 10 mg at 09/08/24 1625    oxyCODONE immediate release tablet 5 mg, 5 mg, Oral, Q4H PRN, [place], Kelsey A, FNP    polyethylene glycol packet 17 g, 17 g, Oral, BID PRN, Yudelka, Carter A, FNP, 17 g at 09/07/24 0926    pregabalin capsule 75 mg, 75 mg, Oral, BID, Yudelka, Carter A, FNP, 75 mg at 09/08/24 0827    QUEtiapine tablet 300 mg, 300 mg, Oral, Nightly, Yudelka, Carter A, FNP, 300 mg at 09/07/24 2139    tamsulosin 24 hr capsule 0.4 mg, 0.4 mg, Oral, QHS, Yudelka, Carter A, FNP, 0.4 mg at 09/07/24 2140    venlafaxine 24 hr capsule 300 mg, 300 mg, Oral, Daily, Yudelka, Carter A, FNP, 300 mg at 09/08/24 0827    vitamin renal formula (B-complex-vitamin c-folic acid) 1 mg per capsule 1 capsule, 1 capsule, Oral, Daily, Wallace, Kelsey A, FNP, 1 capsule at 09/08/24 0826     Review of Systems   Complete 12-point review of symptoms negative except for what's mentioned in HPI     Objective:     /84   Pulse 87   Temp 99 °F (37.2 °C) (Oral)   Resp 18   Ht 5' 8" (1.727 m)   Wt 104.3 kg (229 lb 15 oz)   SpO2 95%   BMI 34.96 kg/m²        Physical Exam  Vitals reviewed.   Eyes:      Pupils: Pupils are equal, round, and reactive to light.   Cardiovascular:      Rate and Rhythm: Normal rate and regular rhythm.      Heart sounds: Normal heart sounds.   Pulmonary:      Effort: Pulmonary effort is normal.      Breath sounds: Normal breath sounds.   Abdominal:      General: Bowel sounds are normal.   Musculoskeletal:         General: Normal range of motion.   Skin:     General: Skin is warm.      Comments: Lumbar surgical incision dry and intact    Neurological:      General: No focal deficit present.      Mental Status: He is alert and oriented to person, place, and time.      Motor: Weakness present.   Psychiatric:         Mood and Affect: Mood normal.       Lines/Drains/Airways       Peripheral Intravenous Line  Duration                  " Peripheral IV - Single Lumen 08/24/24 1717 20 G No Anterior;Right Forearm 15 days                    Labs  Admission on 09/04/2024   Component Date Value Ref Range Status    Sodium 09/05/2024 134 (L)  136 - 145 mmol/L Final    Potassium 09/05/2024 4.4  3.5 - 5.1 mmol/L Final    Chloride 09/05/2024 102  98 - 107 mmol/L Final    CO2 09/05/2024 25  23 - 31 mmol/L Final    Glucose 09/05/2024 110  82 - 115 mg/dL Final    Blood Urea Nitrogen 09/05/2024 15.7  8.4 - 25.7 mg/dL Final    Creatinine 09/05/2024 0.84  0.73 - 1.18 mg/dL Final    Calcium 09/05/2024 8.5 (L)  8.8 - 10.0 mg/dL Final    Protein Total 09/05/2024 5.8  5.8 - 7.6 gm/dL Final    Albumin 09/05/2024 3.0 (L)  3.4 - 4.8 g/dL Final    Globulin 09/05/2024 2.8  2.4 - 3.5 gm/dL Final    Albumin/Globulin Ratio 09/05/2024 1.1  1.1 - 2.0 ratio Final    Bilirubin Total 09/05/2024 0.4  <=1.5 mg/dL Final    ALP 09/05/2024 118  40 - 150 unit/L Final    ALT 09/05/2024 23  0 - 55 unit/L Final    AST 09/05/2024 16  5 - 34 unit/L Final    eGFR 09/05/2024 >60  mL/min/1.73/m2 Final    Anion Gap 09/05/2024 7.0  mEq/L Final    BUN/Creatinine Ratio 09/05/2024 19   Final    Magnesium Level 09/05/2024 2.20  1.60 - 2.60 mg/dL Final    Prealbumin 09/05/2024 35.3  16.0 - 42.0 mg/dL Final    Phosphorus Level 09/05/2024 3.5  2.3 - 4.7 mg/dL Final    Ferritin Level 09/05/2024 48.77  21.81 - 274.66 ng/mL Final    Iron Binding Capacity Unsaturated 09/05/2024 236  69 - 240 ug/dL Final    Iron Level 09/05/2024 36 (L)  65 - 175 ug/dL Final    Transferrin 09/05/2024 247  163 - 344 mg/dL Final    Iron Binding Capacity Total 09/05/2024 272  250 - 450 ug/dL Final    Iron Saturation 09/05/2024 13 (L)  20 - 50 % Final    WBC 09/05/2024 7.04  4.50 - 11.50 x10(3)/mcL Final    RBC 09/05/2024 3.46 (L)  4.70 - 6.10 x10(6)/mcL Final    Hgb 09/05/2024 10.3 (L)  14.0 - 18.0 g/dL Final    Hct 09/05/2024 31.1 (L)  42.0 - 52.0 % Final    MCV 09/05/2024 89.9  80.0 - 94.0 fL Final    MCH 09/05/2024 29.8  27.0 -  31.0 pg Final    MCHC 09/05/2024 33.1  33.0 - 36.0 g/dL Final    RDW 09/05/2024 12.5  11.5 - 17.0 % Final    Platelet 09/05/2024 253  130 - 400 x10(3)/mcL Final    MPV 09/05/2024 8.4  7.4 - 10.4 fL Final    Neut % 09/05/2024 71.3  % Final    Lymph % 09/05/2024 18.0  % Final    Mono % 09/05/2024 9.5  % Final    Eos % 09/05/2024 0.1  % Final    Basophil % 09/05/2024 0.0  % Final    Lymph # 09/05/2024 1.27  0.6 - 4.6 x10(3)/mcL Final    Neut # 09/05/2024 5.01  2.1 - 9.2 x10(3)/mcL Final    Mono # 09/05/2024 0.67  0.1 - 1.3 x10(3)/mcL Final    Eos # 09/05/2024 0.01  0 - 0.9 x10(3)/mcL Final    Baso # 09/05/2024 0.00  <=0.2 x10(3)/mcL Final    IG# 09/05/2024 0.08 (H)  0 - 0.04 x10(3)/mcL Final    IG% 09/05/2024 1.1  % Final    NRBC% 09/05/2024 0.0  % Final    POCT Glucose 09/08/2024 105  70 - 110 mg/dL Final    POCT Glucose 09/08/2024 369 (H)  70 - 110 mg/dL Final    POCT Glucose 09/08/2024 86  70 - 110 mg/dL Final     Radiology   Lumbar XR on 09/07/2024, IMPRESSION:  Postsurgical changes with no adverse interval change appreciated.    Radiology   Cervical XR on 09/07/2024, IMPRESSION:  No adverse interval change appreciated.  The lobar cervical spine is not well visualized.  Radiology  MRI cervical spine 9/4: Impression: No acute traumatic findings identified. Degenerative listhesis at C4-5 and C6-7.   Radiology  X-ray lumbar spine 8/26: Impression: Postoperative changes of posterior fusion with intact hardware     Assessment/Plan:     59 y.o. WM admitted on 9/4/2024     Severe lumbar stenosis with radiculopathy and spondylosis   - s/p TLIF L2-S1 decompression and L3-S1 fusion on 08/19  - LSO brace when OOB, spinal precautions  - initiate Toradol 30 mg IVP x1 dose on 09/07  - continue                 Dexamethasone 2 mg b.i.d. (continue taper)                 Lyrica 75 mg b.i.d. (initiated 9/7)                 Baclofen 10 mg t.i.d.   - follow-up with Neurosurgery outpatient     Bipolar disorder  - mood stable  -  continue                 Venlafaxine 300 mg daily                 Oxcarbazepine 150 mg b.i.d.                  Seroquel 300 mg at night      Valium 5 mg b.i.d. p.r.n. anxiety (initiated 9/6)  - follow-up with psychiatrist outpatient     HLD  - FLP outpatient  - continue                Atorvastatin 40 mg at night      HTN  - blood pressure at goal  - continue                Coreg 12.5 mg b.i.d.                  Hydrochlorothiazide 25 mg daily                  Lisinopril 40 mg daily                 Hydralazine 10 mg every 2 hours as needed for BP > 160/90                Labetalol 10 mg every 2 hours as needed for BP > 160/90  - low sodium diet     Constipation  - stable  - continue                 Colace 100 mg b.i.d.      BPH  - stable  - continue                 Tamsulosin 0.4 mg at night     VTE Prophylaxis:  Lovenox 40 mg daily     POA: no  Living will: no  Contacts: Clemencia Nix (daughter) [phone]                     Dee Dee (sister) [phone]     CODE STATUS: Full  Internal Medicine (attending): Thor King MD  Physiatry (consulting):  Devang Cheung MD     OUTPATIENT PROVIDERS  PCP: Rafy Poole MD   Neurosurgery:  Car August MD   Psychiatry: Josef Sutton MD in [place], [place]     DISPOSITION:  Sleep hygiene, bowel maintenance, and appetite at goal.  Vital signs at goal with no recorded fevers.  No new labs today.  Lumbar and cervical XR with no acute changes.  Continue current POC.  Monitor closely.  Notify of acute changes.  Repeat lab work in the morning.

## 2024-09-08 NOTE — NURSING
Nurses Note -- 4 Eyes      9/8/2024   9:23 AM      Skin assessed during: Q Shift Change      [x] No Altered Skin Integrity Present    []Prevention Measures Documented      [] Yes- Altered Skin Integrity Present or Discovered   [] LDA Added if Not in Epic (Describe Wound)   [] New Altered Skin Integrity was Present on Admit and Documented in LDA   [] Wound Image Taken    Wound Care Consulted? No    Attending Nurse:  Nancy Santoro RN/Staff Member:  yes

## 2024-09-08 NOTE — PT/OT/SLP PROGRESS
Physical Therapy Inpatient Rehab Treatment    Patient Name:  Lionel Nix   MRN:  [id number]    Recommendations:     Discharge Recommendations:  Low Intensity Therapy   Discharge Equipment Recommendations:     Barriers to discharge: Increased level of assist, Ongoing medical treatment, Impaired functional mobility , and Severity of Deficits     Assessment:     Lionel Nix is a 59 y.o. male admitted with a medical diagnosis of S/P lumbar spinal fusion.  He presents with the following impairments/functional limitations:  weakness, impaired endurance, impaired functional mobility, gait instability, impaired balance, decreased safety awareness, pain, decreased ROM, orthopedic precautions .    Rehab Diagnosis: Severe lumbar stenosis with radiculopathy and spondylosis s/p TLIF L2-S1 decompression and fusion 8/19/2024. Pt. Has a past medical history of HTN, HLD, severe lumbar stenosis with lumbosacral radiculopathy and degenerative joint disease of the lumbar spine.    Recent Surgery: * No surgery found *      General Precautions: Standard, fall     Orthopedic Precautions:spinal precautions     Braces: LSO    Rehab Prognosis: Fair; patient would benefit from acute skilled PT services to address these deficits and reach maximum level of function.      History:     Past Medical History:   Diagnosis Date    Anxiety     Chronic right-sided low back pain     Enlarged prostate     Hyperlipidemia     Hypertension     Lumbosacral radiculopathy due to degenerative joint disease of spine     Neuritis or radiculitis due to rupture of lumbar intervertebral disc     Numbness and tingling of both feet     Numbness and tingling of both legs     Obesity     Other spondylosis with radiculopathy, lumbar region        Past Surgical History:   Procedure Laterality Date    BACK SURGERY      COLONOSCOPY      HERNIA REPAIR      SHOULDER SURGERY Left     TRANSFORAMINAL LUMBAR INTERBODY FUSION (TLIF) USING COMPUTER-ASSISTED NAVIGATION Left  "8/19/2024    Procedure: FUSION, SPINE, LUMBAR, TLIF, USING COMPUTER-ASSISTED NAVIGATION;  Surgeon: Car August MD;  Location: [facility];  Service: Neurosurgery;  Laterality: Left;  L2 - S1 TRANSFORAMINAL INTERBODY FUSION / POSTERIOR LATERAL FUSION // PRONE PEDRO // DRILL // MICROSCOPE // O-ARM // DIRECT SPINE // NDM       Subjective     Chief Complaint: pain "all over"; increase back pain with standing/ambulation     Respiratory Status: Room air    Patients cultural, spiritual, Tenriism conflicts given the current situation: no      Objective:     NSG Nancy in during session to administer medication.  Patient found up in chair with peripheral IV  upon PT entry to room.    Pt is Oriented x3 and Alert, Cooperative, Easily distractible, and Impulsive.    Vitals   Vitals at Rest  BP    HR    O2 Sat    Pain      Vitals With Activity  BP    HR    O2 Sat    Pain        Functional Mobility:   Transfers:     Sit to Stand: Supervision or touching assistance  with rolling walker  Toilet Transfer: Supervision or touching assistance  with  grab bars  using  Step Transfer  Gait: Pt ambulates 40 ft with RW with Partial/moderate assistance .   Wheelchair Propulsion:  Pt propelled Standard wheelchair x 150 feet on Level tile with  Bilateral upper extremity with Set-up or clean-up assistance .      Current   Status  Discharge   Goal   Functional Area: Care Score:    Roll Left and Right   Independent   Sit to Lying   Independent   Lying to Sitting on Side of Bed   Independent   Sit to Stand 4 Independent   Chair/Bed-to-Chair Transfer 4 Independent   Car Transfer   Independent   Walk 10 Feet 3 Independent   Walk 50 Feet with Two Turns   Independent   Walk 150 Feet   Independent   Walk 10 Feet Uneven Surface   Independent   1 Step (Curb)   Independent   4 Steps   Independent   12 Steps   Supervision or touching assistance   Picking Up Object 4 Independent   Wheel 50 Feet with Two Turns 5 Not applicable   Wheel 150 Feet 5 Not " applicable       Therapeutic Activities and Exercises:  Seated BLE therex 3 x 10: ankle pumps, LAQ, alt.marching, hip abd./add., hip ER/IR, ball squeezes, and hams. Curls with RTB  Doff/marisol shorts post (+) void while sitting on shower chair with mod.a. to maintain spinal precautions    Activity Tolerance: Good    Patient left up in chair with all lines intact, call button in reach, chair alarm on, and family present.    Education provided: roles and goals of PT/PTA, transfer training, gait training, safety awareness, body mechanics, strengthening exercises, and spinal precautions    Expected compliance: Moderate compliance    GOALS:   Multidisciplinary Problems       Physical Therapy Goals          Problem: Physical Therapy    Goal Priority Disciplines Outcome Goal Variances Interventions   Physical Therapy Goal     PT, PT/OT Progressing     Description: Bed Mobility:  Roll left and right independently.   Sit to supine transfer independently.   Supine to sit transfer independently.     Transfers:  Sit to stand transfer independently using RW.   Bed to chair transfer independently Stand Step  using RW.   Car transfer with supervision/touching assist using RW.    an object from the ground in standing position with supervision/touching assist using RW.     Mobility:  Ambulate 150 feet with supervision/touching assist using RW.  Ambulate 10 feet on uneven surfaces/ramps with supervision/touching assist using RW.   Pt ascended/descended a 4 inch curb with supervision/touching assist using RW.   Ascend/descend 4 stairs with supervision/touching assist using bilateral handrails.                          Plan:     During this hospitalization, patient to be seen 5 x/week to address the identified rehab impairments via gait training, therapeutic activities, therapeutic exercises and progress toward the following goals:    Plan of Care Expires:  09/14/24  PT Next Visit Date: 09/11/24  Plan of Care reviewed with:  patient    Additional Information:         Time Tracking:     Therapy Time  PT Start Time: 1300  PT Stop Time: 1400  PT Total Time (min): 60 min   PT Individual: 60  Missed Time:    Time Missed due to:      Billable Minutes: Gait Training 10, Therapeutic Activity 15, and Therapeutic Exercise 35    09/08/2024

## 2024-09-08 NOTE — PT/OT/SLP PROGRESS
Physical Therapy Inpatient Rehab Treatment    Patient Name:  Lionel Nix   MRN:  [id number]    Recommendations:     Discharge Recommendations:  Low Intensity Therapy   Discharge Equipment Recommendations:     Barriers to discharge: Increased level of assist, Ongoing medical treatment, Impaired functional mobility , and Severity of Deficits     Assessment:     Lionel Nix is a 59 y.o. male admitted with a medical diagnosis of S/P lumbar spinal fusion.  He presents with the following impairments/functional limitations:  weakness, impaired endurance, impaired functional mobility, gait instability, impaired balance, decreased safety awareness, pain, orthopedic precautions.    Rehab Diagnosis: Severe lumbar stenosis with radiculopathy and spondylosis s/p TLIF L2-S1 decompression and fusion 8/19/2024. Pt. Has a past medical history of HTN, HLD, severe lumbar stenosis with lumbosacral radiculopathy and degenerative joint disease of the lumbar spine.    General Precautions: Standard, fall     Orthopedic Precautions:spinal precautions     Braces: LSO    Rehab Prognosis: Fair; patient would benefit from acute skilled PT services to address these deficits and reach maximum level of function.      History:     Past Medical History:   Diagnosis Date    Anxiety     Chronic right-sided low back pain     Enlarged prostate     Hyperlipidemia     Hypertension     Lumbosacral radiculopathy due to degenerative joint disease of spine     Neuritis or radiculitis due to rupture of lumbar intervertebral disc     Numbness and tingling of both feet     Numbness and tingling of both legs     Obesity     Other spondylosis with radiculopathy, lumbar region        Past Surgical History:   Procedure Laterality Date    BACK SURGERY      COLONOSCOPY      HERNIA REPAIR      SHOULDER SURGERY Left     TRANSFORAMINAL LUMBAR INTERBODY FUSION (TLIF) USING COMPUTER-ASSISTED NAVIGATION Left 8/19/2024    Procedure: FUSION, SPINE, LUMBAR, TLIF, USING  COMPUTER-ASSISTED NAVIGATION;  Surgeon: Car August MD;  Location: [facility] OR;  Service: Neurosurgery;  Laterality: Left;  L2 - S1 TRANSFORAMINAL INTERBODY FUSION / POSTERIOR LATERAL FUSION // PRONE PEDRO // DRILL // MICROSCOPE // O-ARM // DIRECT SPINE // NDM       Subjective     Patient comments: pt agreeable to treatment session    Respiratory Status: Room air    Patients cultural, spiritual, Latter-day conflicts given the current situation:      Objective:     Communicated with RN prior to session.  Patient found  up in wheelchair  with peripheral IV (LSO brace)  upon PT entry to room.    Pt is Cooperative, Easily distractible, and Impulsive.    Vitals     Pain Pain Rating 1: other (see comments) (unrated)  Location - Side 1: Bilateral  Location - Orientation 1: generalized  Location 1: back  Pain Addressed 1: Distraction, Reposition       Functional Mobility:      Current   Status  Discharge   Goal   Functional Area: Care Score:    Sit to Stand 4  Overall CGA with RW Independent   Chair/Bed-to-Chair Transfer 4  Overall CGA with RW using stand-step transfer; cues for RW proximity and proper form Independent   Wheel 50 Feet with Two Turns 5 Not applicable   Wheel 150 Feet 5  ~250 ft with BUE propulsion Not applicable       Therapeutic Activities and Exercises:  Toilet transfer: CGA with RW using stand-step transfer; (+) void  Seated therapeutic exercise, performed at edge of mat (BLE):  LAQ (2#) - 2x15  Marches (2#) - 2x15  Hip abduction (GTB) - 2x10  Standing marches - x multiple reps with RW and CGA  The pt required frequent cuing to remain on task and increased time to complete activities due to being easily distracted and very loquacious.     Patient educated on role of acute care PT and PT POC, safety while in hospital including calling nurse for mobility, and call light usage  Patient educated about importance of OOB mobility and remaining up in chair most of the day.      Activity Tolerance:  Good    Patient left up in chair with all lines intact, call button in reach, and chair alarm on.    Education provided: roles and goals of PT/PTA, transfer training, safety awareness, body mechanics, assistive device, strengthening exercises, and fall prevention    Expected compliance: Moderate compliance and Low compliance    Plan:     During this hospitalization, patient to be seen 5 x/week (5-7x) to address the identified rehab impairments via gait training, therapeutic activities, therapeutic exercises, neuromuscular re-education and progress toward the following goals:    GOALS:   Multidisciplinary Problems       Physical Therapy Goals          Problem: Physical Therapy    Goal Priority Disciplines Outcome Goal Variances Interventions   Physical Therapy Goal     PT, PT/OT Progressing     Description: Bed Mobility:  Roll left and right independently.   Sit to supine transfer independently.   Supine to sit transfer independently.     Transfers:  Sit to stand transfer independently using RW.   Bed to chair transfer independently Stand Step  using RW.   Car transfer with supervision/touching assist using RW.    an object from the ground in standing position with supervision/touching assist using RW.     Mobility:  Ambulate 150 feet with supervision/touching assist using RW.  Ambulate 10 feet on uneven surfaces/ramps with supervision/touching assist using RW.   Pt ascended/descended a 4 inch curb with supervision/touching assist using RW.   Ascend/descend 4 stairs with supervision/touching assist using bilateral handrails.                          Plan of Care Expires:     PT Next Visit Date: 09/11/24  Plan of Care reviewed with: patient    Additional Information:         Time Tracking:     Therapy Time  PT Received On: 09/08/24  PT Start Time: 0830  PT Stop Time: 0930  PT Total Time (min): 60 min   PT Individual: 60  Missed Time:    Time Missed due to:      Billable Minutes: Therapeutic Activity 25 and  Therapeutic Exercise 35    09/08/2024

## 2024-09-09 LAB
ALBUMIN SERPL-MCNC: 3.2 G/DL (ref 3.4–4.8)
ALBUMIN/GLOB SERPL: 1 RATIO (ref 1.1–2)
ALP SERPL-CCNC: 153 UNIT/L (ref 40–150)
ALT SERPL-CCNC: 28 UNIT/L (ref 0–55)
ANION GAP SERPL CALC-SCNC: 9 MEQ/L
AST SERPL-CCNC: 19 UNIT/L (ref 5–34)
BASOPHILS # BLD AUTO: 0.01 X10(3)/MCL
BASOPHILS NFR BLD AUTO: 0.1 %
BILIRUB SERPL-MCNC: 0.4 MG/DL
BUN SERPL-MCNC: 15.5 MG/DL (ref 8.4–25.7)
CALCIUM SERPL-MCNC: 8.9 MG/DL (ref 8.8–10)
CHLORIDE SERPL-SCNC: 92 MMOL/L (ref 98–107)
CHOLEST SERPL-MCNC: 154 MG/DL
CHOLEST/HDLC SERPL: 2 {RATIO} (ref 0–5)
CO2 SERPL-SCNC: 28 MMOL/L (ref 23–31)
CREAT SERPL-MCNC: 0.96 MG/DL (ref 0.73–1.18)
CREAT/UREA NIT SERPL: 16
EOSINOPHIL # BLD AUTO: 0.01 X10(3)/MCL (ref 0–0.9)
EOSINOPHIL NFR BLD AUTO: 0.1 %
ERYTHROCYTE [DISTWIDTH] IN BLOOD BY AUTOMATED COUNT: 12.8 % (ref 11.5–17)
GFR SERPLBLD CREATININE-BSD FMLA CKD-EPI: >60 ML/MIN/1.73/M2
GLOBULIN SER-MCNC: 3.1 GM/DL (ref 2.4–3.5)
GLUCOSE SERPL-MCNC: 107 MG/DL (ref 82–115)
HCT VFR BLD AUTO: 32.7 % (ref 42–52)
HDLC SERPL-MCNC: 67 MG/DL (ref 35–60)
HGB BLD-MCNC: 10.9 G/DL (ref 14–18)
IMM GRANULOCYTES # BLD AUTO: 0.05 X10(3)/MCL (ref 0–0.04)
IMM GRANULOCYTES NFR BLD AUTO: 0.5 %
LDLC SERPL CALC-MCNC: 61 MG/DL (ref 50–140)
LYMPHOCYTES # BLD AUTO: 1.05 X10(3)/MCL (ref 0.6–4.6)
LYMPHOCYTES NFR BLD AUTO: 10.6 %
MAGNESIUM SERPL-MCNC: 2 MG/DL (ref 1.6–2.6)
MCH RBC QN AUTO: 30.5 PG (ref 27–31)
MCHC RBC AUTO-ENTMCNC: 33.3 G/DL (ref 33–36)
MCV RBC AUTO: 91.6 FL (ref 80–94)
MONOCYTES # BLD AUTO: 0.67 X10(3)/MCL (ref 0.1–1.3)
MONOCYTES NFR BLD AUTO: 6.8 %
NEUTROPHILS # BLD AUTO: 8.13 X10(3)/MCL (ref 2.1–9.2)
NEUTROPHILS NFR BLD AUTO: 81.9 %
NRBC BLD AUTO-RTO: 0 %
PHOSPHATE SERPL-MCNC: 3.6 MG/DL (ref 2.3–4.7)
PLATELET # BLD AUTO: 191 X10(3)/MCL (ref 130–400)
PMV BLD AUTO: 8.7 FL (ref 7.4–10.4)
POTASSIUM SERPL-SCNC: 3.6 MMOL/L (ref 3.5–5.1)
PREALB SERPL-MCNC: 32.6 MG/DL (ref 16–42)
PROT SERPL-MCNC: 6.3 GM/DL (ref 5.8–7.6)
RBC # BLD AUTO: 3.57 X10(6)/MCL (ref 4.7–6.1)
SODIUM SERPL-SCNC: 129 MMOL/L (ref 136–145)
TRIGL SERPL-MCNC: 131 MG/DL (ref 34–140)
VLDLC SERPL CALC-MCNC: 26 MG/DL
WBC # BLD AUTO: 9.92 X10(3)/MCL (ref 4.5–11.5)

## 2024-09-09 PROCEDURE — 25000003 PHARM REV CODE 250: Performed by: NURSE PRACTITIONER

## 2024-09-09 PROCEDURE — 36415 COLL VENOUS BLD VENIPUNCTURE: CPT | Performed by: NURSE PRACTITIONER

## 2024-09-09 PROCEDURE — 84100 ASSAY OF PHOSPHORUS: CPT | Performed by: NURSE PRACTITIONER

## 2024-09-09 PROCEDURE — 85025 COMPLETE CBC W/AUTO DIFF WBC: CPT | Performed by: NURSE PRACTITIONER

## 2024-09-09 PROCEDURE — 80061 LIPID PANEL: CPT | Performed by: NURSE PRACTITIONER

## 2024-09-09 PROCEDURE — 94799 UNLISTED PULMONARY SVC/PX: CPT

## 2024-09-09 PROCEDURE — 97530 THERAPEUTIC ACTIVITIES: CPT

## 2024-09-09 PROCEDURE — 25000003 PHARM REV CODE 250

## 2024-09-09 PROCEDURE — 63600175 PHARM REV CODE 636 W HCPCS: Performed by: NURSE PRACTITIONER

## 2024-09-09 PROCEDURE — 83735 ASSAY OF MAGNESIUM: CPT | Performed by: NURSE PRACTITIONER

## 2024-09-09 PROCEDURE — 99900035 HC TECH TIME PER 15 MIN (STAT)

## 2024-09-09 PROCEDURE — 99900031 HC PATIENT EDUCATION (STAT)

## 2024-09-09 PROCEDURE — 99233 SBSQ HOSP IP/OBS HIGH 50: CPT | Mod: ,,, | Performed by: NURSE PRACTITIONER

## 2024-09-09 PROCEDURE — 80053 COMPREHEN METABOLIC PANEL: CPT | Performed by: NURSE PRACTITIONER

## 2024-09-09 PROCEDURE — 11800000 HC REHAB PRIVATE ROOM

## 2024-09-09 PROCEDURE — 84134 ASSAY OF PREALBUMIN: CPT | Performed by: NURSE PRACTITIONER

## 2024-09-09 PROCEDURE — 94761 N-INVAS EAR/PLS OXIMETRY MLT: CPT

## 2024-09-09 PROCEDURE — 97116 GAIT TRAINING THERAPY: CPT

## 2024-09-09 PROCEDURE — 97535 SELF CARE MNGMENT TRAINING: CPT

## 2024-09-09 RX ORDER — DIAZEPAM 5 MG/1
5 TABLET ORAL EVERY 8 HOURS PRN
Status: DISCONTINUED | OUTPATIENT
Start: 2024-09-09 | End: 2024-09-13 | Stop reason: HOSPADM

## 2024-09-09 RX ORDER — QUETIAPINE FUMARATE 200 MG/1
400 TABLET, FILM COATED ORAL NIGHTLY
Status: DISCONTINUED | OUTPATIENT
Start: 2024-09-09 | End: 2024-09-13

## 2024-09-09 RX ORDER — KETOROLAC TROMETHAMINE 30 MG/ML
15 INJECTION, SOLUTION INTRAMUSCULAR; INTRAVENOUS ONCE
Status: COMPLETED | OUTPATIENT
Start: 2024-09-09 | End: 2024-09-09

## 2024-09-09 RX ORDER — PREGABALIN 75 MG/1
75 CAPSULE ORAL 3 TIMES DAILY
Status: DISCONTINUED | OUTPATIENT
Start: 2024-09-09 | End: 2024-09-13 | Stop reason: HOSPADM

## 2024-09-09 RX ORDER — LAMOTRIGINE 25 MG/1
25 TABLET ORAL DAILY
Status: DISCONTINUED | OUTPATIENT
Start: 2024-09-10 | End: 2024-09-11

## 2024-09-09 RX ORDER — FUROSEMIDE 10 MG/ML
40 INJECTION INTRAMUSCULAR; INTRAVENOUS ONCE
Status: COMPLETED | OUTPATIENT
Start: 2024-09-09 | End: 2024-09-09

## 2024-09-09 RX ADMIN — TAMSULOSIN HYDROCHLORIDE 0.4 MG: 0.4 CAPSULE ORAL at 08:09

## 2024-09-09 RX ADMIN — CARVEDILOL 12.5 MG: 6.25 TABLET, FILM COATED ORAL at 07:09

## 2024-09-09 RX ADMIN — OXYCODONE HYDROCHLORIDE 10 MG: 5 TABLET ORAL at 08:09

## 2024-09-09 RX ADMIN — BENZONATATE 100 MG: 100 CAPSULE ORAL at 08:09

## 2024-09-09 RX ADMIN — PREGABALIN 75 MG: 75 CAPSULE ORAL at 08:09

## 2024-09-09 RX ADMIN — FUROSEMIDE 40 MG: 10 INJECTION, SOLUTION INTRAMUSCULAR; INTRAVENOUS at 11:09

## 2024-09-09 RX ADMIN — Medication 1 CAPSULE: at 07:09

## 2024-09-09 RX ADMIN — PREGABALIN 75 MG: 75 CAPSULE ORAL at 07:09

## 2024-09-09 RX ADMIN — DOCUSATE SODIUM 100 MG: 100 CAPSULE, LIQUID FILLED ORAL at 08:09

## 2024-09-09 RX ADMIN — OXYCODONE HYDROCHLORIDE 10 MG: 5 TABLET ORAL at 04:09

## 2024-09-09 RX ADMIN — DOCUSATE SODIUM 100 MG: 100 CAPSULE, LIQUID FILLED ORAL at 07:09

## 2024-09-09 RX ADMIN — HYDROCHLOROTHIAZIDE 25 MG: 25 TABLET ORAL at 07:09

## 2024-09-09 RX ADMIN — ACETAMINOPHEN 650 MG: 325 TABLET ORAL at 02:09

## 2024-09-09 RX ADMIN — ACETAMINOPHEN 650 MG: 325 TABLET ORAL at 08:09

## 2024-09-09 RX ADMIN — BACLOFEN 10 MG: 5 TABLET ORAL at 02:09

## 2024-09-09 RX ADMIN — DEXAMETHASONE 2 MG: 2 TABLET ORAL at 08:09

## 2024-09-09 RX ADMIN — BACLOFEN 10 MG: 5 TABLET ORAL at 08:09

## 2024-09-09 RX ADMIN — OXYCODONE HYDROCHLORIDE 10 MG: 5 TABLET ORAL at 05:09

## 2024-09-09 RX ADMIN — VENLAFAXINE HYDROCHLORIDE 300 MG: 150 CAPSULE, EXTENDED RELEASE ORAL at 07:09

## 2024-09-09 RX ADMIN — BENZONATATE 100 MG: 100 CAPSULE ORAL at 12:09

## 2024-09-09 RX ADMIN — HYDROCODONE BITARTRATE AND ACETAMINOPHEN 1 TABLET: 5; 325 TABLET ORAL at 07:09

## 2024-09-09 RX ADMIN — ENOXAPARIN SODIUM 40 MG: 40 INJECTION SUBCUTANEOUS at 04:09

## 2024-09-09 RX ADMIN — PREGABALIN 75 MG: 75 CAPSULE ORAL at 11:09

## 2024-09-09 RX ADMIN — OXCARBAZEPINE 150 MG: 150 TABLET, FILM COATED ORAL at 07:09

## 2024-09-09 RX ADMIN — CARVEDILOL 12.5 MG: 6.25 TABLET, FILM COATED ORAL at 08:09

## 2024-09-09 RX ADMIN — ACETAMINOPHEN 650 MG: 325 TABLET ORAL at 05:09

## 2024-09-09 RX ADMIN — DEXAMETHASONE 2 MG: 2 TABLET ORAL at 07:09

## 2024-09-09 RX ADMIN — ATORVASTATIN CALCIUM 40 MG: 40 TABLET, FILM COATED ORAL at 08:09

## 2024-09-09 RX ADMIN — QUETIAPINE 400 MG: 200 TABLET ORAL at 08:09

## 2024-09-09 RX ADMIN — KETOROLAC TROMETHAMINE 15 MG: 30 INJECTION, SOLUTION INTRAMUSCULAR at 01:09

## 2024-09-09 RX ADMIN — BACLOFEN 10 MG: 5 TABLET ORAL at 05:09

## 2024-09-09 RX ADMIN — LISINOPRIL 40 MG: 10 TABLET ORAL at 07:09

## 2024-09-09 NOTE — NURSING
Nurses Note -- 4 Eyes      9/9/2024   5:17 PM      Skin assessed during: Q Shift Change      [] No Altered Skin Integrity Present    []Prevention Measures Documented      [x] Yes- Altered Skin Integrity Present or Discovered   [] LDA Added if Not in Epic (Describe Wound)   [x] New Altered Skin Integrity was Present on Admit and Documented in LDA   [] Wound Image Taken    Wound Care Consulted? No    Attending Nurse:  RICHIE Gan    Second RN/Staff Member:  MELODIE Hutchinson

## 2024-09-09 NOTE — PT/OT/SLP PROGRESS
Pt was not seen for Recreational Therapy due to schedule conflict.    Pt progress, plan of care and discharge plans were discussed during staffing at 0930

## 2024-09-09 NOTE — PT/OT/SLP PROGRESS
"Occupational Therapy Inpatient Rehab Treatment    Name: Lionel Nix  MRN: [id number]    Assessment:  Lionel Nix is a 59 y.o. male admitted with a medical diagnosis of S/P lumbar spinal fusion.  He presents with the following impairments/functional limitations:  weakness, impaired endurance, impaired self care skills, impaired functional mobility, gait instability, impaired balance, decreased lower extremity function, decreased safety awareness, pain, decreased ROM, orthopedic precautions.    General Precautions: Standard, fall     Orthopedic Precautions:spinal precautions     Braces: LSO    Rehab Prognosis: Good; patient would benefit from acute skilled OT services to address these deficits and reach maximum level of function.      History:     Past Medical History:   Diagnosis Date    Anxiety     Chronic right-sided low back pain     Enlarged prostate     Hyperlipidemia     Hypertension     Lumbosacral radiculopathy due to degenerative joint disease of spine     Neuritis or radiculitis due to rupture of lumbar intervertebral disc     Numbness and tingling of both feet     Numbness and tingling of both legs     Obesity     Other spondylosis with radiculopathy, lumbar region        Past Surgical History:   Procedure Laterality Date    BACK SURGERY      COLONOSCOPY      HERNIA REPAIR      SHOULDER SURGERY Left     TRANSFORAMINAL LUMBAR INTERBODY FUSION (TLIF) USING COMPUTER-ASSISTED NAVIGATION Left 8/19/2024    Procedure: FUSION, SPINE, LUMBAR, TLIF, USING COMPUTER-ASSISTED NAVIGATION;  Surgeon: Car August MD;  Location: [facility];  Service: Neurosurgery;  Laterality: Left;  L2 - S1 TRANSFORAMINAL INTERBODY FUSION / POSTERIOR LATERAL FUSION // PRONE PEDRO // DRILL // MICROSCOPE // O-ARM // DIRECT SPINE // NDM       Subjective     Orientation: Oriented x4    Chief Complaint: Pt upset regarding his current life situation.     Patient/Family Comments/goals: "I am sorry I am so emotional. My life is just falling " "apart."    Respiratory Status: Room air    Patients cultural, spiritual, Adventism conflicts given the current situation: no       Objective:     Patient found up in chair with peripheral IV  upon OT entry to room. Patient was crying upon arrival, listening to [employer] music.     Mobility   Patient completed:  Sit to Stand Transfer with contact guard assistance with rolling walker  Stand to Sit Transfer with contact guard assistance with rolling walker  Tub Transfer Step Transfer technique with contact guard assistance with rolling walker    Functional Mobility  Pt performed FM in room with CGA using RW to perform ADLs     ADLs   Current Status   Shower, Bathe Self 4 SPV for safety. Pt provided with LHS to reach feet and legs while maintaining pxns    Upper Body Dressing 5   Lower Body Dressing 4 CGA for safety. Pt utilized reacher to don underwear and pants    Putting On, Taking Off Footwear 3 Assistance required to don tennis shoes using shoe horn. Pt able to use sock aide without assistance      Limiting Factors for ADLs: motor, psychsocial, endurance, limited ROM, balance, weakness, safety awareness, and pain     Patient left up in chair with all lines intact, call button in reach, and chair alarm on.     Education provided: Roles and goals of OT, ADLs, transfer training, modified goals, sequencing, safety precautions, and fall prevention    Multidisciplinary Problems       Occupational Therapy Goals          Problem: Occupational Therapy    Goal Priority Disciplines Outcome Interventions   Occupational Therapy Goal     OT, PT/OT Progressing    Description: STG's to review at Re-eval:    Patient will demonstrate improved independence with upper body dressing while efficiently donning shirt and LSO brace with SPV.  Patient will demonstrate improved energy conservation and safety awareness while utilizing safety strategies taught during ADLs and transfers with Min cueing.  Patient will perform lower body " dressing with use of AE prn and SPV.  Patient will perform toilet hygiene with CGA while maintaining spinal precautions and use of RW/grab bars as needed.  Patient will perform tub transfer with SBA and use of RW.   Patient will perform showering with SPV for safety while maintaining precautions and using TTB and grab bars as needed.                        Time Tracking     OT Received On: 09/09/24  Time In 0830     Time Out 0930  Total Time 60 min  Therapy Time: OT Individual: 60  Missed Time:    Missed Time Reason:      Billable Minutes: Self Care/Home Management 60    09/09/2024

## 2024-09-09 NOTE — CONSULTS
9/9/2024  Lionel Nix   1965   [id number]            Psychiatry Initial Consult Note     Date of Admission: 9/4/2024  2:20 PM    Chief Complaint: Consult for herrera    SUBJECTIVE:   History of Present Illness:   Lionel Nix is a 59 y.o. male with a past medical history that includes HTN, HLD, severe lumbar stenosis with lumbosacral radiculopathy and degenerative joint disease of the lumbar spine who was admitted to [facility] on 08/19/24 and underwent a TLIF L2-SA decompression and fusion. Presented to inpatient rehab to improve functional status at [facility] on 09/04/24. Was seen by psychiatry during initial hospitalization on 08/24/24 due to depression. Psychiatry being consulted today due to evidence of herrera.    When initially seen he had expressed he had endorses depressed mood with stressors of back injury in January, and financial difficulties. Had reported previous neuropsychological testing and a diagnosis of bipolar disorder, anxiety, and insomnia. Had endorses being easily irritable/agitated, on edge, low mood, sadness, ruminating thoughts, and over thinking.     Seen at the bedside today where he is up to a chair and in no apparent distress. He is pleasant and cooperative with interview. Reports worsening depressed mood over past several weeks as well as an increase in irritability. Endorses frequent tearful episodes. Reports sleeping well with quetiapine and denies issues with appetite. Endorses feelings of guilt and impaired concentration. Denies suicidal ideations. Displays episodes of herrera in the form of recent irritable mood, but also displays talkative and difficult to interrupt speech as well as increased psychomotor activity. No evidence of psychosis. Denies homicidal ideations, auditory/visual hallucinations, or paranoia.     Past Psychiatric History:   Previous Psychiatric Hospitalizations: Denies  Previous Medication Trials: Gabapentin, Effexor, Ambien, Clonazepam, Seroquel  Previous Suicide  Attempts: Denies  Outpatient psychiatrist: None    Current Medications:   Home Psychiatric Meds: Ambien 10mg pO QHS PRN; Quetiapine 300mg PO QHS; Venlafaxine 300mg PO QD    Past Medical/Surgical History:   Past Medical History:   Diagnosis Date    Anxiety     Chronic right-sided low back pain     Enlarged prostate     Hyperlipidemia     Hypertension     Lumbosacral radiculopathy due to degenerative joint disease of spine     Neuritis or radiculitis due to rupture of lumbar intervertebral disc     Numbness and tingling of both feet     Numbness and tingling of both legs     Obesity     Other spondylosis with radiculopathy, lumbar region      Past Surgical History:   Procedure Laterality Date    BACK SURGERY      COLONOSCOPY      HERNIA REPAIR      SHOULDER SURGERY Left     TRANSFORAMINAL LUMBAR INTERBODY FUSION (TLIF) USING COMPUTER-ASSISTED NAVIGATION Left 8/19/2024    Procedure: FUSION, SPINE, LUMBAR, TLIF, USING COMPUTER-ASSISTED NAVIGATION;  Surgeon: Car Auguts MD;  Location: [facility];  Service: Neurosurgery;  Laterality: Left;  L2 - S1 TRANSFORAMINAL INTERBODY FUSION / POSTERIOR LATERAL FUSION // PRONE PEDRO // DRILL // MICROSCOPE // O-ARM // DIRECT SPINE // NDM         Family Psychiatric History:   Denies    Allergies:   Review of patient's allergies indicates:   Allergen Reactions    Gabapentin Anxiety and Palpitations       Substance Abuse History:   Tobacco: Denies  Alcohol: Denies  Illicit Substances: Denies  Treatment: Denies        Scheduled Meds:    acetaminophen  650 mg Oral TID    atorvastatin  40 mg Oral QHS    baclofen  10 mg Oral TID    carvediloL  12.5 mg Oral BID    dexAMETHasone  2 mg Oral Q12H    docusate sodium  100 mg Oral BID    enoxparin  40 mg Subcutaneous Q24H (prophylaxis, 1700)    hydroCHLOROthiazide  25 mg Oral Daily    lisinopriL  40 mg Oral Daily    OXcarbazepine  150 mg Oral BID    pregabalin  75 mg Oral TID    QUEtiapine  300 mg Oral Nightly    tamsulosin  0.4 mg Oral QHS     venlafaxine  300 mg Oral Daily    vitamin renal formula (B-complex-vitamin c-folic acid)  1 capsule Oral Daily      PRN Meds:   Current Facility-Administered Medications:     acetaminophen, 650 mg, Oral, Q8H PRN    benzonatate, 100 mg, Oral, TID PRN    clonazePAM, 1 mg, Oral, BID PRN    diazePAM, 5 mg, Oral, Q12H PRN    hydrALAZINE, 10 mg, Intravenous, Q4H PRN    HYDROcodone-acetaminophen, 1 tablet, Oral, Daily PRN    hydrOXYzine pamoate, 50 mg, Oral, Nightly PRN    labetalol, 10 mg, Intravenous, Q4H PRN    metoprolol, 10 mg, Intravenous, Q2H PRN    nitroGLYCERIN, 0.4 mg, Sublingual, Q5 Min PRN    ondansetron, 8 mg, Oral, Q8H PRN    ondansetron, 4 mg, Intravenous, Q8H PRN    oxyCODONE, 10 mg, Oral, Q4H PRN    oxyCODONE, 5 mg, Oral, Q4H PRN    polyethylene glycol, 17 g, Oral, BID PRN   Psychotherapeutics (From admission, onward)      Start     Stop Route Frequency Ordered    09/06/24 1217  diazePAM tablet 5 mg         -- Oral Every 12 hours PRN 09/06/24 1117    09/05/24 0800  venlafaxine 24 hr capsule 300 mg         -- Oral Daily 09/04/24 1458    09/04/24 2100  QUEtiapine tablet 300 mg         -- Oral Nightly 09/04/24 1458              Social History:  Housing Status: Lives with family  Relationship Status/Sexual Orientation:   Children: 3  Employment Status/Info: History of working offshore   history: Denies  History of physical/sexual abuse: Denies        Legal History:   Past Charges/Incarcerations: Denies  Pending charges: Denies      OBJECTIVE:       Vitals   Vitals:    09/09/24 1515   BP: 129/82   Pulse: 89   Resp: 20   Temp: 98 °F (36.7 °C)        Labs/Imaging/Studies:   Recent Results (from the past 48 hour(s))   POCT glucose    Collection Time: 09/08/24 12:19 PM   Result Value Ref Range    POCT Glucose 105 70 - 110 mg/dL   POCT glucose    Collection Time: 09/08/24 12:21 PM   Result Value Ref Range    POCT Glucose 369 (H) 70 - 110 mg/dL   POCT glucose    Collection Time: 09/08/24 12:27 PM    Result Value Ref Range    POCT Glucose 86 70 - 110 mg/dL   Comprehensive Metabolic Panel    Collection Time: 09/09/24  5:31 AM   Result Value Ref Range    Sodium 129 (L) 136 - 145 mmol/L    Potassium 3.6 3.5 - 5.1 mmol/L    Chloride 92 (L) 98 - 107 mmol/L    CO2 28 23 - 31 mmol/L    Glucose 107 82 - 115 mg/dL    Blood Urea Nitrogen 15.5 8.4 - 25.7 mg/dL    Creatinine 0.96 0.73 - 1.18 mg/dL    Calcium 8.9 8.8 - 10.0 mg/dL    Protein Total 6.3 5.8 - 7.6 gm/dL    Albumin 3.2 (L) 3.4 - 4.8 g/dL    Globulin 3.1 2.4 - 3.5 gm/dL    Albumin/Globulin Ratio 1.0 (L) 1.1 - 2.0 ratio    Bilirubin Total 0.4 <=1.5 mg/dL     (H) 40 - 150 unit/L    ALT 28 0 - 55 unit/L    AST 19 5 - 34 unit/L    eGFR >60 mL/min/1.73/m2    Anion Gap 9.0 mEq/L    BUN/Creatinine Ratio 16    Lipid Panel    Collection Time: 09/09/24  5:31 AM   Result Value Ref Range    Cholesterol Total 154 <=200 mg/dL    HDL Cholesterol 67 (H) 35 - 60 mg/dL    Triglyceride 131 34 - 140 mg/dL    Cholesterol/HDL Ratio 2 0 - 5    Very Low Density Lipoprotein 26     LDL Cholesterol 61.00 50.00 - 140.00 mg/dL   Prealbumin    Collection Time: 09/09/24  5:31 AM   Result Value Ref Range    Prealbumin 32.6 16.0 - 42.0 mg/dL   Magnesium    Collection Time: 09/09/24  5:31 AM   Result Value Ref Range    Magnesium Level 2.00 1.60 - 2.60 mg/dL   Phosphorus    Collection Time: 09/09/24  5:31 AM   Result Value Ref Range    Phosphorus Level 3.6 2.3 - 4.7 mg/dL   CBC with Differential    Collection Time: 09/09/24  5:31 AM   Result Value Ref Range    WBC 9.92 4.50 - 11.50 x10(3)/mcL    RBC 3.57 (L) 4.70 - 6.10 x10(6)/mcL    Hgb 10.9 (L) 14.0 - 18.0 g/dL    Hct 32.7 (L) 42.0 - 52.0 %    MCV 91.6 80.0 - 94.0 fL    MCH 30.5 27.0 - 31.0 pg    MCHC 33.3 33.0 - 36.0 g/dL    RDW 12.8 11.5 - 17.0 %    Platelet 191 130 - 400 x10(3)/mcL    MPV 8.7 7.4 - 10.4 fL    Neut % 81.9 %    Lymph % 10.6 %    Mono % 6.8 %    Eos % 0.1 %    Basophil % 0.1 %    Lymph # 1.05 0.6 - 4.6 x10(3)/mcL    Neut  "# 8.13 2.1 - 9.2 x10(3)/mcL    Mono # 0.67 0.1 - 1.3 x10(3)/mcL    Eos # 0.01 0 - 0.9 x10(3)/mcL    Baso # 0.01 <=0.2 x10(3)/mcL    IG# 0.05 (H) 0 - 0.04 x10(3)/mcL    IG% 0.5 %    NRBC% 0.0 %      No results found for: "PHENYTOIN", "PHENOBARB", "VALPROATE", "CBMZ"        Psychiatric Mental Status Exam:  General Appearance: appears stated age, appropriately dressed, in no acute distress  Arousal: alert  Behavior: cooperative, polite, appropriate eye-contact, restless and fidgety  Movements and Motor Activity: +psychomotor agitation  Orientation: oriented to person, place, and time  Speech: coherent, talkative  Mood: Depressed  Affect: reactive, full-range  Thought Process: circumstantial  Associations: no loosening of associations  Thought Content and Perceptions: no suicidal or homicidal ideation, no auditory or visual hallucinations, no paranoid ideation, no ideas of reference, no evidence of delusions or psychosis  Recent and Remote Memory: grossly intact; per interview/observation with patient  Attention and Concentration: grossly intact; per interview/observation with patient  Fund of Knowledge: grossly intact; based on history, vocabulary, fund of knowledge, syntax, grammar, and content  Insight: adequate; based on understanding of severity of illness and HPI  Judgment: adequate; based on patient's behavior and HPI      ASSESSMENT/PLAN:   Diagnoses:  MOOD DISORDERS; Bipolar I Disorder, Most Recent Episode Mixed: Moderate (F31.62)         Past Medical History:   Diagnosis Date    Anxiety     Chronic right-sided low back pain     Enlarged prostate     Hyperlipidemia     Hypertension     Lumbosacral radiculopathy due to degenerative joint disease of spine     Neuritis or radiculitis due to rupture of lumbar intervertebral disc     Numbness and tingling of both feet     Numbness and tingling of both legs     Obesity     Other spondylosis with radiculopathy, lumbar region           Problem lists and Management " Plans:  Medication Management  Discontinue effexor (evidence of manic symptoms)  Discontinue trileptal (Decrease sodium)  Increase quetiapine to 400mg PO QHS  Lamictal 25mg PO QD  Legal  PEC not recommended at this time  Will continue to follow        Virgil Chaudhari

## 2024-09-09 NOTE — PLAN OF CARE
"Called in psych reconsult to [facility] Dept. for unresolved herrera (hx bipolar dis), despite pt being back on home med regimen.    1342:  Spoke with sister Dee Dee ([phone]) and confirmed that she will welcome pt into her home upon his release, though she does have some limitations.  She works Tuesdays thru Saturdays so would only possibly be available to  pt on Mondays.  She is also having some of her own health issues (recent thyroid removal, mammogram upcoming, and having sciatica issues), so timing between her own medical f/u appts will be factors.  Dee Dee's  also lives in the home and also works full-time.  Dee Dee hopes that the patient's attitude toward his daughter, Yesica, changes between now and discharge, saying, "That's his daughter..."  She is very well aware of pt's mood disorder and how his attitude toward each family member vacillates according to his moods.  She verified her address as [address] [place] [address] and that it is a 2 story home with a downstairs bathroom.  They do not currently have a downstairs bed for patient but plan to move one downstairs for his use.        "

## 2024-09-09 NOTE — PROGRESS NOTES
[facility] ([facility])  Rehab Progress Note    Patient Name: Lionel Nix  MRN: [id number]  Age: 59 y.o. Sex: male  : 1965  Hospital Length of Stay: 5 days  Date of Service: 2024   Chief Complaint: Severe lumbar stenosis with radiculopathy and spondylosis s/p TLIF L2-S1 decompression and L3-S1 fusion on      Subjective:     Basic Information  Admit Information: 59-year-old male presented to [facility] on  for scheduled lumbar decompression and fusion. PMH significant for hypertension, hyperlipidemia, anxiety, depression.  On  tolerated TLIF L2-S1 decompression and L3-S1 fusion without perioperative complications.  On  discontinued TRAVIS drain.  Postop CT scan with good placement of hardware.  Psychiatry evaluated on .  Continued Effexor, Seroquel, and initiated Trileptal.  Continued with postoperative pain.  Lyrica initiated along with increase in hypertensive medications.  Reported fall on .  MRI cervical spine with no acute traumatic findings identified and degenerative listhesis at C4-5 and C6-7.  No neurosurgical interventions planned for C-spine. Tolerated transfer to [facility] inpatient rehab unit on  without incident.   Today's Information: No acute events overnight.  Sitting up in chair.  Reports poor sleep overnight.  Appetite and bowel maintenance at goal.  Pain improved overnight.  Vital signs at goal with no recorded fevers.  CBC unremarkable.  Sodium 129.  FLP stable.  No new imaging today.    Review of patient's allergies indicates:   Allergen Reactions    Gabapentin Anxiety and Palpitations        Current Facility-Administered Medications:     acetaminophen tablet 650 mg, 650 mg, Oral, TID, Kelsey Gonsalez, FNP, 650 mg at 24 05    acetaminophen tablet 650 mg, 650 mg, Oral, Q8H PRN, Kelsey Gonsalez, FNP    atorvastatin tablet 40 mg, 40 mg, Oral, QHS, Carter Jha, FNP, 40 mg at 24    baclofen tablet 10 mg, 10 mg,  Oral, TID, Alivia Garcia, FNP, 10 mg at 09/09/24 0522    benzonatate capsule 100 mg, 100 mg, Oral, TID PRN, Carter Jha A, FNP, 100 mg at 09/08/24 2107    carvediloL tablet 12.5 mg, 12.5 mg, Oral, BID, Alivia Garcia B, FNP, 12.5 mg at 09/09/24 0754    clonazePAM tablet 1 mg, 1 mg, Oral, BID PRN, Kelsey Gonsalez A, FNP, 1 mg at 09/07/24 2140    dexAMETHasone tablet 2 mg, 2 mg, Oral, Q12H, Alivia Garcia, FNP, 2 mg at 09/09/24 0754    diazePAM tablet 5 mg, 5 mg, Oral, Q12H PRN, Alivia Garcia B, FNP, 5 mg at 09/08/24 2103    docusate sodium capsule 100 mg, 100 mg, Oral, BID, Carter Jha A, FNP, 100 mg at 09/09/24 0754    enoxaparin injection 40 mg, 40 mg, Subcutaneous, Q24H (prophylaxis, 1700), Carter Jha A, FNP, 40 mg at 09/08/24 1625    furosemide injection 40 mg, 40 mg, Intramuscular, Once, Yudelka Carter A, FNP    hydrALAZINE injection 10 mg, 10 mg, Intravenous, Q4H PRN, Yudelka Carter A, FNP    hydroCHLOROthiazide tablet 25 mg, 25 mg, Oral, Daily, Alivia Garcia, FNP, 25 mg at 09/09/24 0754    HYDROcodone-acetaminophen 5-325 mg per tablet 1 tablet, 1 tablet, Oral, Daily PRN, Jake Jhaothy A, FNP, 1 tablet at 09/09/24 0754    hydrOXYzine pamoate capsule 50 mg, 50 mg, Oral, Nightly PRN, Yudelka Carter A, FNP    labetalol 20 mg/4 mL (5 mg/mL) IV syring, 10 mg, Intravenous, Q4H PRN, Carter Jha A, FNP, 10 mg at 09/06/24 2149    lisinopriL tablet 40 mg, 40 mg, Oral, Daily, Alivia Garcia, ECHOP, 40 mg at 09/09/24 0754    metoprolol injection 10 mg, 10 mg, Intravenous, Q2H PRN, Carter Jha, FNP    nitroGLYCERIN SL tablet 0.4 mg, 0.4 mg, Sublingual, Q5 Min PRN, Carter Jha A, FNP    ondansetron disintegrating tablet 8 mg, 8 mg, Oral, Q8H PRN, Carter Jha, FNP    ondansetron injection 4 mg, 4 mg, Intravenous, Q8H PRN, Carter Jha A, FNP    OXcarbazepine tablet 150 mg, 150 mg, Oral, BID, Carter Jha FNP, 150 mg at  "09/09/24 0754    oxyCODONE immediate release tablet 10 mg, 10 mg, Oral, Q4H PRN, Wallace, Kelsey A, FNP, 10 mg at 09/09/24 0523    oxyCODONE immediate release tablet 5 mg, 5 mg, Oral, Q4H PRN, [place], Kelsey A, FNP    polyethylene glycol packet 17 g, 17 g, Oral, BID PRN, Yudelka, Carter A, FNP, 17 g at 09/07/24 0926    pregabalin capsule 75 mg, 75 mg, Oral, BID, Yudelka, Carter A, FNP, 75 mg at 09/09/24 0754    QUEtiapine tablet 300 mg, 300 mg, Oral, Nightly, Yudelka, Carter A, FNP, 300 mg at 09/08/24 2102    tamsulosin 24 hr capsule 0.4 mg, 0.4 mg, Oral, QHS, Yudelka, Carter A, FNP, 0.4 mg at 09/08/24 2103    venlafaxine 24 hr capsule 300 mg, 300 mg, Oral, Daily, Yuedlka, Carter A, FNP, 300 mg at 09/09/24 0754    vitamin renal formula (B-complex-vitamin c-folic acid) 1 mg per capsule 1 capsule, 1 capsule, Oral, Daily, [place], Kelsey A, FNP, 1 capsule at 09/09/24 0754     Review of Systems   Complete 12-point review of symptoms negative except for what's mentioned in HPI     Objective:     /67   Pulse 87   Temp 98 °F (36.7 °C) (Oral)   Resp 18   Ht 5' 8" (1.727 m)   Wt 104.3 kg (229 lb 15 oz)   SpO2 97%   BMI 34.96 kg/m²        Physical Exam  Vitals reviewed.   Eyes:      Pupils: Pupils are equal, round, and reactive to light.   Cardiovascular:      Rate and Rhythm: Normal rate and regular rhythm.      Heart sounds: Normal heart sounds.   Pulmonary:      Effort: Pulmonary effort is normal.      Breath sounds: Normal breath sounds.   Abdominal:      General: Bowel sounds are normal.   Musculoskeletal:         General: Normal range of motion.   Skin:     General: Skin is warm.      Comments: Lumbar surgical incision dry and intact    Neurological:      General: No focal deficit present.      Mental Status: He is alert and oriented to person, place, and time.      Motor: Weakness present.   Psychiatric:         Mood and Affect: Mood normal.     *MD performed and documented physical " examination       Lines/Drains/Airways       Peripheral Intravenous Line  Duration                  Peripheral IV - Single Lumen 08/24/24 1717 20 G No Anterior;Right Forearm 15 days                    Labs  Admission on 09/04/2024   Component Date Value Ref Range Status    Sodium 09/05/2024 134 (L)  136 - 145 mmol/L Final    Potassium 09/05/2024 4.4  3.5 - 5.1 mmol/L Final    Chloride 09/05/2024 102  98 - 107 mmol/L Final    CO2 09/05/2024 25  23 - 31 mmol/L Final    Glucose 09/05/2024 110  82 - 115 mg/dL Final    Blood Urea Nitrogen 09/05/2024 15.7  8.4 - 25.7 mg/dL Final    Creatinine 09/05/2024 0.84  0.73 - 1.18 mg/dL Final    Calcium 09/05/2024 8.5 (L)  8.8 - 10.0 mg/dL Final    Protein Total 09/05/2024 5.8  5.8 - 7.6 gm/dL Final    Albumin 09/05/2024 3.0 (L)  3.4 - 4.8 g/dL Final    Globulin 09/05/2024 2.8  2.4 - 3.5 gm/dL Final    Albumin/Globulin Ratio 09/05/2024 1.1  1.1 - 2.0 ratio Final    Bilirubin Total 09/05/2024 0.4  <=1.5 mg/dL Final    ALP 09/05/2024 118  40 - 150 unit/L Final    ALT 09/05/2024 23  0 - 55 unit/L Final    AST 09/05/2024 16  5 - 34 unit/L Final    eGFR 09/05/2024 >60  mL/min/1.73/m2 Final    Anion Gap 09/05/2024 7.0  mEq/L Final    BUN/Creatinine Ratio 09/05/2024 19   Final    Magnesium Level 09/05/2024 2.20  1.60 - 2.60 mg/dL Final    Prealbumin 09/05/2024 35.3  16.0 - 42.0 mg/dL Final    Phosphorus Level 09/05/2024 3.5  2.3 - 4.7 mg/dL Final    Ferritin Level 09/05/2024 48.77  21.81 - 274.66 ng/mL Final    Iron Binding Capacity Unsaturated 09/05/2024 236  69 - 240 ug/dL Final    Iron Level 09/05/2024 36 (L)  65 - 175 ug/dL Final    Transferrin 09/05/2024 247  163 - 344 mg/dL Final    Iron Binding Capacity Total 09/05/2024 272  250 - 450 ug/dL Final    Iron Saturation 09/05/2024 13 (L)  20 - 50 % Final    WBC 09/05/2024 7.04  4.50 - 11.50 x10(3)/mcL Final    RBC 09/05/2024 3.46 (L)  4.70 - 6.10 x10(6)/mcL Final    Hgb 09/05/2024 10.3 (L)  14.0 - 18.0 g/dL Final    Hct 09/05/2024 31.1  (L)  42.0 - 52.0 % Final    MCV 09/05/2024 89.9  80.0 - 94.0 fL Final    MCH 09/05/2024 29.8  27.0 - 31.0 pg Final    MCHC 09/05/2024 33.1  33.0 - 36.0 g/dL Final    RDW 09/05/2024 12.5  11.5 - 17.0 % Final    Platelet 09/05/2024 253  130 - 400 x10(3)/mcL Final    MPV 09/05/2024 8.4  7.4 - 10.4 fL Final    Neut % 09/05/2024 71.3  % Final    Lymph % 09/05/2024 18.0  % Final    Mono % 09/05/2024 9.5  % Final    Eos % 09/05/2024 0.1  % Final    Basophil % 09/05/2024 0.0  % Final    Lymph # 09/05/2024 1.27  0.6 - 4.6 x10(3)/mcL Final    Neut # 09/05/2024 5.01  2.1 - 9.2 x10(3)/mcL Final    Mono # 09/05/2024 0.67  0.1 - 1.3 x10(3)/mcL Final    Eos # 09/05/2024 0.01  0 - 0.9 x10(3)/mcL Final    Baso # 09/05/2024 0.00  <=0.2 x10(3)/mcL Final    IG# 09/05/2024 0.08 (H)  0 - 0.04 x10(3)/mcL Final    IG% 09/05/2024 1.1  % Final    NRBC% 09/05/2024 0.0  % Final    POCT Glucose 09/08/2024 105  70 - 110 mg/dL Final    POCT Glucose 09/08/2024 369 (H)  70 - 110 mg/dL Final    POCT Glucose 09/08/2024 86  70 - 110 mg/dL Final    Sodium 09/09/2024 129 (L)  136 - 145 mmol/L Final    Every Monday    Potassium 09/09/2024 3.6  3.5 - 5.1 mmol/L Final    Every Monday    Chloride 09/09/2024 92 (L)  98 - 107 mmol/L Final    Every Monday    CO2 09/09/2024 28  23 - 31 mmol/L Final    Every Monday    Glucose 09/09/2024 107  82 - 115 mg/dL Final    Every Monday    Blood Urea Nitrogen 09/09/2024 15.5  8.4 - 25.7 mg/dL Final    Every Monday    Creatinine 09/09/2024 0.96  0.73 - 1.18 mg/dL Final    Every Monday    Calcium 09/09/2024 8.9  8.8 - 10.0 mg/dL Final    Every Monday    Protein Total 09/09/2024 6.3  5.8 - 7.6 gm/dL Final    Every Monday    Albumin 09/09/2024 3.2 (L)  3.4 - 4.8 g/dL Final    Every Monday    Globulin 09/09/2024 3.1  2.4 - 3.5 gm/dL Final    Albumin/Globulin Ratio 09/09/2024 1.0 (L)  1.1 - 2.0 ratio Final    Bilirubin Total 09/09/2024 0.4  <=1.5 mg/dL Final    Every Monday    ALP 09/09/2024 153 (H)  40 - 150 unit/L Final     Every Monday    ALT 09/09/2024 28  0 - 55 unit/L Final    Every Monday    AST 09/09/2024 19  5 - 34 unit/L Final    Every Monday    eGFR 09/09/2024 >60  mL/min/1.73/m2 Final    Anion Gap 09/09/2024 9.0  mEq/L Final    BUN/Creatinine Ratio 09/09/2024 16   Final    Cholesterol Total 09/09/2024 154  <=200 mg/dL Final    Every Monday    HDL Cholesterol 09/09/2024 67 (H)  35 - 60 mg/dL Final    Every Monday    Triglyceride 09/09/2024 131  34 - 140 mg/dL Final    Every Monday    Cholesterol/HDL Ratio 09/09/2024 2  0 - 5 Final    Very Low Density Lipoprotein 09/09/2024 26   Final    LDL Cholesterol 09/09/2024 61.00  50.00 - 140.00 mg/dL Final    Prealbumin 09/09/2024 32.6  16.0 - 42.0 mg/dL Final    Magnesium Level 09/09/2024 2.00  1.60 - 2.60 mg/dL Final    Every Monday    Phosphorus Level 09/09/2024 3.6  2.3 - 4.7 mg/dL Final    Every Monday    WBC 09/09/2024 9.92  4.50 - 11.50 x10(3)/mcL Final    RBC 09/09/2024 3.57 (L)  4.70 - 6.10 x10(6)/mcL Final    Hgb 09/09/2024 10.9 (L)  14.0 - 18.0 g/dL Final    Hct 09/09/2024 32.7 (L)  42.0 - 52.0 % Final    MCV 09/09/2024 91.6  80.0 - 94.0 fL Final    MCH 09/09/2024 30.5  27.0 - 31.0 pg Final    MCHC 09/09/2024 33.3  33.0 - 36.0 g/dL Final    RDW 09/09/2024 12.8  11.5 - 17.0 % Final    Platelet 09/09/2024 191  130 - 400 x10(3)/mcL Final    MPV 09/09/2024 8.7  7.4 - 10.4 fL Final    Neut % 09/09/2024 81.9  % Final    Lymph % 09/09/2024 10.6  % Final    Mono % 09/09/2024 6.8  % Final    Eos % 09/09/2024 0.1  % Final    Basophil % 09/09/2024 0.1  % Final    Lymph # 09/09/2024 1.05  0.6 - 4.6 x10(3)/mcL Final    Neut # 09/09/2024 8.13  2.1 - 9.2 x10(3)/mcL Final    Mono # 09/09/2024 0.67  0.1 - 1.3 x10(3)/mcL Final    Eos # 09/09/2024 0.01  0 - 0.9 x10(3)/mcL Final    Baso # 09/09/2024 0.01  <=0.2 x10(3)/mcL Final    IG# 09/09/2024 0.05 (H)  0 - 0.04 x10(3)/mcL Final    IG% 09/09/2024 0.5  % Final    NRBC% 09/09/2024 0.0  % Final     Radiology   Lumbar XR on 09/07/2024, IMPRESSION:   Postsurgical changes with no adverse interval change appreciated.    Radiology   Cervical XR on 09/07/2024, IMPRESSION:  No adverse interval change appreciated.  The lobar cervical spine is not well visualized.  Radiology  MRI cervical spine 9/4: Impression: No acute traumatic findings identified. Degenerative listhesis at C4-5 and C6-7.   Radiology  X-ray lumbar spine 8/26: Impression: Postoperative changes of posterior fusion with intact hardware     Assessment/Plan:     59 y.o. WM admitted on 9/4/2024     Severe lumbar stenosis with radiculopathy and spondylosis   - s/p TLIF L2-S1 decompression and L3-S1 fusion on 08/19  - LSO brace when OOB, spinal precautions  - continue                 Dexamethasone 2 mg b.i.d. (continue taper)                 Lyrica 75 mg b.i.d. (initiated 9/7)                 Baclofen 10 mg t.i.d.   - follow-up with Neurosurgery outpatient     Bipolar disorder  - manic overnight, improved this morning  - continue                 Venlafaxine 300 mg daily                 Oxcarbazepine 150 mg b.i.d.                  Seroquel 300 mg at night      Valium 5 mg b.i.d. p.r.n. anxiety (initiated 9/6)  - follow-up with psychiatrist outpatient     D  - [facility] outpatient  - continue                Atorvastatin 40 mg at night      HTN  - blood pressure at goal  - continue                Coreg 12.5 mg b.i.d.                  Hydrochlorothiazide 25 mg daily                  Lisinopril 40 mg daily                 Hydralazine 10 mg every 2 hours as needed for BP > 160/90                Labetalol 10 mg every 2 hours as needed for BP > 160/90  - low sodium diet     Constipation  - stable  - continue                 Colace 100 mg b.i.d.      BPH  - stable  - continue                 Tamsulosin 0.4 mg at night    Hyponatremia  - sodium 129-trending down  - initiate Lasix 40 mg IVP on 01/09 x1  - encourage electrolyte drinks  - repeat lab work in the morning     VTE Prophylaxis:  Lovenox 40 mg daily     POA:  no  Living will: no  Contacts: Clemencia Nix (daughter) [phone]                     Dee Dee (sister) [phone]     CODE STATUS: Full  Internal Medicine (attending): Thor King MD  Physiatry (consulting):  Devang Cheung MD     OUTPATIENT PROVIDERS  PCP: Rafy Poole MD   Neurosurgery:  Car August MD   Psychiatry: Josef Sutton MD in [place], [place]     DISPOSITION:  Sleep hygiene poor overnight due to herrera.  Reportedly improved today.  Appetite and bowel maintenance at goal.  Vital signs at goal with no recorded fevers.  CBC unremarkable.  Sodium 129-trending down.  FLP stable.  No new imaging today.  Initiate Lasix 40 mg IVP x1.  Psychiatry consulted for evaluation of medications.  Reports he has a marijuana card.  Requesting marijuana.  Encourage electrolyte drinks to help with sodium.  Repeat lab work in the morning.  Monitor closely.  Notify of acute changes.  Staffing completed today.    Staffing 9/9/2024: Continent of bowel and bladder. RT: Overall min assist.  Impulsive with poor safety. PT: Overall SBA.  Ambulating 50 feet partial mod to total assist. Poor carry over and safety awareness.  Needs constant verbal cueing. OT: Minimal progress. Complains of pain with medication.  Requires frequent redirection. Overall mod assist with ADLs. ST: Impaired cognition 2/2 bipolar. Projected discharge pending.     Jake Jha NP conducted independent physical examination and assisted with medical documentation.    Total time spent on this encounter including chart review and direct MD + NP 1-on-1 patient interaction: 57 minutes   Over 50% of this time was spent in counseling and coordination of care

## 2024-09-09 NOTE — PLAN OF CARE
Problem: Rehabilitation ([facility]) Plan of Care  Goal: Plan of Care Review  Outcome: Progressing  Goal: Patient-Specific Goal (Individualized)  Outcome: Progressing  Goal: Absence of New-Onset Illness or Injury  Outcome: Progressing  Goal: Optimal Comfort and Wellbeing  Outcome: Not Progressing  Goal: Home and Community Transition Plan Established  Outcome: Progressing     Problem: Wound  Goal: Optimal Coping  Outcome: Not Progressing  Goal: Optimal Functional Ability  Outcome: Not Progressing  Goal: Absence of Infection Signs and Symptoms  Outcome: Progressing  Goal: Improved Oral Intake  Outcome: Progressing  Goal: Optimal Pain Control and Function  Outcome: Not Progressing  Goal: Skin Health and Integrity  Outcome: Progressing  Goal: Optimal Wound Healing  Outcome: Progressing     Problem: Fall Injury Risk  Goal: Absence of Fall and Fall-Related Injury  Outcome: Progressing     Problem: Pain Acute  Goal: Optimal Pain Control and Function  Outcome: Not Progressing

## 2024-09-09 NOTE — PROGRESS NOTES
Dos 9/9/24  Patient seen in PT gym today  Participation and progress toward goals noted  Continues working on strength, mobility, balance and increasing endurance  Progress and problems discussed with patient and therapists  Questions answered  Chart reviewed and discussed with medicine team as well as Marilyn Gonsalez, my FNP  Continue present management  Subjective  HPI:  58 yo WM with a PMH of HTN, HLD, severe lumbar stenosis with lumbosacral radiculopathy and degenerative joint disease of lumbar spine was admitted to [facility] on 8/19/24, and underwent a TLIF L2-S1 decompression and fusion by Dr. August. Drain was placed to site. Patient placed on spinal precautions with LSO brace. On 8/20, PT/OT evals completed with impairments noted of weakness, impaired balance, orthopedic precautions, pain, impaired self care skills, impaired endurance, and impairedfunctional mobility. Labs showed low RBC of 3.09, low H&H of 9.8 & 27.6, low potassium of 3.0, elevated glucose of 118, low calcium of 7.8. On 8/21, patient complained of right posterior leg pain which worsened with sitting. TRAVIS drain remained in place. Decadron was increased. On 8/22, patient complained of continued leg pain and numbness so CT of lumbar spine was completed which showed postoperative changes without evidence of acute abnormality. Patient had continued buckling of BLE with therapy noted with high risk for falls. On 8/23, TRAVIS drain had 20ml output so drain was removed. Patient reported depression due to current situation so elijah was consulted. On 8/24, psych was consulted for depression and anxiety with recommendation to continue Effexor XR 300mg daily, continue seroquel 300mg at bedtime, added Trileptal 150mg BID, added Ambien PRN, and added Hydroxyzine 25mg TID PRN intense anxiety episodes. Patient will also need outpatient followup with therapist. On 8/25, patient fell after using restroom, Dr August was notified of fall, and Xrays ordered. On 8/26, Lumbar  spine XR showed Postoperative changes of posterior fusion with intact hardware. Sacrum XR showed No fractures or dislocations are clearly identified, Degenerative changes. Patient complained of back pain rating at 10 on 1-10 scale. Left knee XR showed no acute abnormalities seen. On 8/27, Cervical XR showed Limited exam with some degenerative changes and anterolisthesis as described above but essentially nondiagnostic other imaging modalities and or repeat exam is suggested. On 8/28, Patient BP uncontrolled so hospitialist was consulted for hypertension management. Patient was started on Metoprolol 50mg PO BID. On 8/29, BP remained elevated so increased Lisinopril to 40mg, changed Metoprolol to Carvedilol, and continued HCTZ. Lyrica was also added due to uncontrolled pain, and side effects of previously trialed gabapentin. On 8/30, WBC elevated so ordered Chest XR which was negative for acute findings. D-Dimer was elevated at 0.55, low Na noted at 134. On 9/1, labs showed elevated WBC of 12.40, low H&H of 9.9 & 30.1, low NA of 135, elevated glucose of 126, low calcium of 8.2. On 9/2, MRI of cervical spine was completed with No acute traumatic findings identified; Degenerative listhesis at C4-5 and C6-7. On 9/4, neurosurgeon reviewed MRI with no surgical intervention planned, OK to continue to mobilize, and OK transfer to rehab. Patient is AAOx4.  Participating with therapy. Functional status includes minimal assist needed for transfers with RW, walked 50ft with RW at minimal to moderate assist with episode of knee buckling noted, setup assist for grooming while seated, modified independent for toilet hygiene but required moderate assist for clothing management, minimal assist for lower body dressing and modified independent for socks with reacher, and setup for eating.Patient was evaluated, accepted, and admitted to inpatient rehab to improve functional status. Transferred to [facility] on 9/4 without incident.     9/9:  Seen with PT, seated in WC. Asks me if I heard about what happened overnight. States that nursing was only offering him ASA when in excruciating pain, and came in with an attitude. Says that once they called a provider and got an order for injection, it was helpful. Appreciative, but feels as if the nurse had not handled it properly. Nursing Manager addressing complaints. Patient not sleeping at night and appears to be in a Manic state. Reconsult Psych as when he was seen last, he was in a depressed state. Patient states that he has a medical marijuana card and that has been more helpful than any prescription medication. Inquire about use of marinol with other medications. Increase Lyrica for shooting nerve pain. Patient placed in harness for Solo Step and Amb w/RW. The security helping with patient's anxiety of falling and knee buckling. Sit to stand at CGA. Progressing in therapy without current complaint. VSSAF.         Review of Systems  Psychiatric: Has a history of bipolar disorder, anxiety, depression, and claustrophobia.      Depression/Anxiety:  denies depression. States that he takes Seroquel every day. Home psych medications reviewed, continued,  and Clonazepam added PRN Anxiety (home med).    OXcarbazepine tablet 150 mg BID  QUEtiapine tablet 300 mg qHS  venlafaxine 24 hr capsule 300 mg qd  clonazePAM tablet 1 mg BID PRN Anxiety  Pain: back pain radiating down hips and legs, numbness/tingling to BLE     acetaminophen tablet 650 mg TID  baclofen tablet 10 mg TID  dexAMETHasone tablet 2 mg q12h  pregabalin capsule 75 mg BID. Increase to TID  acetaminophen tablet 650 mg q8h PRN mild pain  oxyCODONE immediate release tablet 5 mg q4h PRN mod pain  oxyCODONE immediate release tablet 10 mg q4h PRN severe pain  Bowels/Bladder: last BM 9/4 per patient     Appetite:  good   Sleep: not falling asleep   QUEtiapine tablet 300 mg qHS  hydrOXYzine pamoate capsule 50 mg qHS PRN Insomnia          Physical Exam  General:  well-developed, well-nourished, bipolar-manic (fidgety, energetic)  Eye: EOMI, clear conjunctiva, eyelids normal  HENT: normocephalic, oropharynx and nasal mucosal surfaces moist  Neck: full range of motion, supple  Respiratory: equal chest rise, no SOB, no audible wheeze  Cardiovascular: regular rate and rhythm, no edema  Gastrointestinal: soft, non-tender, non-distended   Musculoskeletal: BLE weakness  Integumentary: no rashes or skin lesions present  Neurologic: cranial nerves intact, lumbar radiculopathy  *MD performed and documented physical examination           Labs:   Latest Reference Range & Units 09/09/24 05:31   WBC 4.50 - 11.50 x10(3)/mcL 9.92   RBC 4.70 - 6.10 x10(6)/mcL 3.57 (L)   Hemoglobin 14.0 - 18.0 g/dL 10.9 (L)   Hematocrit 42.0 - 52.0 % 32.7 (L)   MCV 80.0 - 94.0 fL 91.6   MCH 27.0 - 31.0 pg 30.5   MCHC 33.0 - 36.0 g/dL 33.3   RDW 11.5 - 17.0 % 12.8   Platelet Count 130 - 400 x10(3)/mcL 191   MPV 7.4 - 10.4 fL 8.7   Neut % % 81.9   LYMPH % % 10.6   Mono % % 6.8   Eos % % 0.1   Basophil % % 0.1   Immature Granulocytes % 0.5   Neut # 2.1 - 9.2 x10(3)/mcL 8.13   Lymph # 0.6 - 4.6 x10(3)/mcL 1.05   Mono # 0.1 - 1.3 x10(3)/mcL 0.67   Eos # 0 - 0.9 x10(3)/mcL 0.01   Baso # <=0.2 x10(3)/mcL 0.01   Immature Grans (Abs) 0 - 0.04 x10(3)/mcL 0.05 (H)   nRBC % 0.0   Sodium 136 - 145 mmol/L 129 (L)   Potassium 3.5 - 5.1 mmol/L 3.6   Chloride 98 - 107 mmol/L 92 (L)   CO2 23 - 31 mmol/L 28   Anion Gap mEq/L 9.0   BUN 8.4 - 25.7 mg/dL 15.5   Creatinine 0.73 - 1.18 mg/dL 0.96   BUN/CREAT RATIO  16   eGFR mL/min/1.73/m2 >60   Glucose 82 - 115 mg/dL 107   Calcium 8.8 - 10.0 mg/dL 8.9   Phosphorus Level 2.3 - 4.7 mg/dL 3.6   Magnesium  1.60 - 2.60 mg/dL 2.00   ALP 40 - 150 unit/L 153 (H)   PROTEIN TOTAL 5.8 - 7.6 gm/dL 6.3   Albumin 3.4 - 4.8 g/dL 3.2 (L)   Albumin/Globulin Ratio 1.1 - 2.0 ratio 1.0 (L)   Prealbumin 16.0 - 42.0 mg/dL 32.6   BILIRUBIN TOTAL <=1.5 mg/dL 0.4   AST 5 - 34 unit/L 19   ALT 0 - 55 unit/L  28   Globulin, Total 2.4 - 3.5 gm/dL 3.1   Cholesterol Total <=200 mg/dL 154   HDL 35 - 60 mg/dL 67 (H)   Total Cholesterol/HDL Ratio 0 - 5  2   Triglycerides 34 - 140 mg/dL 131   LDL Cholesterol 50.00 - 140.00 mg/dL 61.00   Very Low Density Lipoprotein  26   (L): Data is abnormally low  (H): Data is abnormally high              Diagnostics:  XR LUMBAR SPINE 2 OR 3 VIEWS   Impression:  Postsurgical changes with no adverse interval change appreciated.  Date:                                            09/07/2024  Time:                                           12:15      XR CERVICAL SPINE 2 OR 3 VIEWS   Impression:  No adverse interval change appreciated.  The Lober cervical spine is not well visualized.  Date:                                            09/07/2024  Time:                                           12:18            Assessment/Plan  Hospital   Neuritis or radiculitis due to rupture of lumbar intervertebral disc   Other spondylosis with radiculopathy, lumbar region   Lumbosacral radiculopathy due to degenerative joint disease of spine   Radiculopathy of sacral region   S/P lumbar spinal fusion     Non-Hospital   Chronic midline low back pain without sciatica   Benign prostatic hyperplasia with nocturia   Claudication   Cluster B personality disorder   External hemorrhoid, bleeding   Former smoker, stopped smoking many years ago   ADALI (generalized anxiety disorder)   History of bipolar disorder   History of colon polyps   Essential hypertension   Hyperlipidemia   IFG (impaired fasting glucose)   Palpitations   Polyarthralgia   Prediabetes   Tobacco use disorder, moderate, in sustained remission       Wounds: back eyjhovge-cqoqsgipzmi-o/d/I  S/p TLIF L2-S1 decompression and fusion on 8/19, by Dr. August. Drain was placed to site  Precautions: spinal  Bracing: LSO  Swallowing: Regular Diet (Low Sodium)  Function: Tolerating therapy. Continue PT/OT  VTE Prophylaxis:   enoxaparin injection 40 mg SubQ q24h  Code  Status: FULL CODE   Discharge: Plans to live with his daughter in [place] in a single-story home with 1 threshold step to enter the residence. Completed 9th grade. He has no  history. Prior to 7 months ago, pt. Was working full time offshore. Wife and their daughter manage the home, cook, grocery shop, do laundry, and clean.  Reports that he is  from his wife.  He stated that they split up and had to sell their home.  They were living in [place].  He will be going to live with one of his daughters in [place].  If that does not work out, he will move in with his sister in the [facility]. Children: (3). Date pending.                Kelsey Gonsalez NP, conducted additional independent physical examination and assisted with medical documentation.

## 2024-09-09 NOTE — NURSING
Patient c/o back pain and pain to renzo legs. Rates pain 10/10. Patient was given baclofen, oxy, lyrica and valium.  No other pain meds due at this time for pain besides tylenol. Other things offer for pain relieve such as ice. Patient refused options allowed at this time. Patient wanting to be transferred to [facility]. Notified Jake Jha NP. New orders noted.

## 2024-09-09 NOTE — NURSING
Nurses Note -- 4 Eyes      9/8/2024   9:25 PM      Skin assessed during: Q Shift Change      [] No Altered Skin Integrity Present    []Prevention Measures Documented      [x] Yes- Altered Skin Integrity Present or Discovered   [] LDA Added if Not in Epic (Describe Wound)   [] New Altered Skin Integrity was Present on Admit and Documented in LDA   [] Wound Image Taken    Wound Care Consulted? No    Attending Nurse:  Yudy Santoro RN/Staff Member:  Kim SEWELL   Surgical incision lower back

## 2024-09-09 NOTE — PT/OT/SLP PROGRESS
Physical Therapy Inpatient Rehab Treatment    Patient Name:  Lionel Nix   MRN:  [id number]    Recommendations:     Discharge Recommendations:  Low Intensity Therapy   Discharge Equipment Recommendations:     Barriers to discharge: Impaired functional mobility     Assessment:     Lionel Nix is a 59 y.o. male admitted with a medical diagnosis of S/P lumbar spinal fusion.  He presents with the following impairments/functional limitations:  weakness, impaired endurance, impaired functional mobility, gait instability, impaired balance, decreased safety awareness, pain, decreased ROM, orthopedic precautions.    PT Assessment: Pt required VC throughout both sessions for redirection and attention to task, with decreased insight into deficits noted. Significantly worse in PM session vs AM session. In PM, pt forgot 3 times that he was attached to the solo-step and impulsively attempted to walk away from the track. Additionally, he exhibited some agitation during the PM session; he initially stated that he was too tired to continue, then that therapy staff wasn't challenging him enough by having him walk with RW + solo-step. So, PT had pt ambulate without a RW (per pt request, PT insisted on continuing to use solo-step for safety) and perform stair climbing (also per pt request), and then pt stated that it was too challenging. Severely decreased safety noted with gait without AD and with stair climbing, with pts B knees buckling throughout and pt significantly unsteady requiring partial/mod A for balance, solo-step assisting pt when B knees would buckle. Afterwards, PT discussed with pt and pt seemed to demonstrate more understanding of his deficits, and his agitation decreased. PT unsure, but hopeful, for pt to have increased carryover of understanding.    Rehab Diagnosis: Severe lumbar stenosis with radiculopathy and spondylosis s/p TLIF L2-S1 decompression and fusion 8/19/2024. Pt. Has a past medical history of HTN, HLD,  severe lumbar stenosis with lumbosacral radiculopathy and degenerative joint disease of the lumbar spine.    General Precautions: Standard, fall     Orthopedic Precautions:spinal precautions     Braces: LSO    Rehab Prognosis: Good; patient would benefit from acute skilled PT services to address these deficits and reach maximum level of function.      History:     Past Medical History:   Diagnosis Date    Anxiety     Chronic right-sided low back pain     Enlarged prostate     Hyperlipidemia     Hypertension     Lumbosacral radiculopathy due to degenerative joint disease of spine     Neuritis or radiculitis due to rupture of lumbar intervertebral disc     Numbness and tingling of both feet     Numbness and tingling of both legs     Obesity     Other spondylosis with radiculopathy, lumbar region        Past Surgical History:   Procedure Laterality Date    BACK SURGERY      COLONOSCOPY      HERNIA REPAIR      SHOULDER SURGERY Left     TRANSFORAMINAL LUMBAR INTERBODY FUSION (TLIF) USING COMPUTER-ASSISTED NAVIGATION Left 8/19/2024    Procedure: FUSION, SPINE, LUMBAR, TLIF, USING COMPUTER-ASSISTED NAVIGATION;  Surgeon: Car August MD;  Location: [facility];  Service: Neurosurgery;  Laterality: Left;  L2 - S1 TRANSFORAMINAL INTERBODY FUSION / POSTERIOR LATERAL FUSION // PRONE PEDRO // DRILL // MICROSCOPE // O-ARM // DIRECT SPINE // NDM       Subjective     Patient comments: pt agreeable to participate with PT. Verbalized having significant anxiety with gait d/t his knees buckling + falling recently.    Respiratory Status: Room air    Patients cultural, spiritual, Sabianism conflicts given the current situation: no    Objective:     Communicated with pt prior to session.  Patient found up in chair with peripheral IV  upon PT entry to room at start of AM session and PM session    Pt is Oriented x3 and  Loquacious, Alert, Cooperative, Easily distractible, and Impulsive.    Vitals   Pain Pain Rating 1: 9/10  Location - Side  1: Bilateral  Location - Orientation 1: lower  Location 1: back  Pain Addressed 1: Reposition, Cessation of Activity  Pain Rating Post-Intervention 1: 5/10       Functional Mobility:    Current   Status  Discharge   Goal   Functional Area: Care Score:    Roll Left and Right   Independent   Sit to Lying   Independent   Lying to Sitting on Side of Bed   Independent   Sit to Stand 4  Mostly CGA provided for sit<>stand with use of RW, however, occasional instances of partial/mod A required. Independent   Chair/Bed-to-Chair Transfer   Independent   Car Transfer   Independent   Walk 10 Feet 1 Independent   Walk 50 Feet with Two Turns 1  In AM: pt amb 71' + 174' with CGA and use of RW. Solo-step used for safety d/t pt fearful of knee buckling. No significant knee buckling noted this session, except for one time when pt wanted to demonstrate how it happened in the past, causing his L knee to buckle. VC needed for upright posture and keeping RW close. Very slow gait speed with NBOS.    In PM: pt amb 40' + 40' + 86' with CGA and use of RW. Solo-step used for safety d/t increased knee buckling noted in PM vs AM. Overall CGA provided throughout with solo-step, however, occasional partial/mod A needed for balance d/t NBOS with scissoring, impulsivity, and decreased safety awareness. Independent   Walk 150 Feet   Independent   Walk 10 Feet Uneven Surface   Independent   1 Step (Curb) 1 Independent   4 Steps 1  Pt amb up/down 4 stairs with B rails using solo-step. Partial/mod A provided by PT for balance and safety, however, task would be unsafe to attempt (or require a significant amount of increased assistance x2 persons) without solo-step at this time d/t B knee buckling.  Independent   12 Steps 88 Supervision or touching assistance   Picking Up Object   Independent   Wheel 50 Feet with Two Turns   Not applicable   Wheel 150 Feet   Not applicable       Therapeutic Activities and Exercises:  Patient educated on role of acute  care PT and PT POC and safety while in hospital including calling nurse for mobility  Patient educated about importance of OOB mobility and remaining up in chair most of the day.    Activity Tolerance: Good    Patient left up in chair with call button in reach and chair alarm on at end of AM session and PM session    Education provided: roles and goals of PT/PTA, transfer training, gait training, balance training, safety awareness, body mechanics, assistive device, fall prevention, and spinal precautions. Continued education recommended.    Expected compliance: Moderate compliance    Plan:     During this hospitalization, patient to be seen 5 x/week to address the identified rehab impairments via gait training, therapeutic activities, therapeutic exercises and progress toward the following goals:    GOALS:   Multidisciplinary Problems       Physical Therapy Goals          Problem: Physical Therapy    Goal Priority Disciplines Outcome Goal Variances Interventions   Physical Therapy Goal     PT, PT/OT Progressing     Description: Bed Mobility:  Roll left and right independently.   Sit to supine transfer independently.   Supine to sit transfer independently.     Transfers:  Sit to stand transfer independently using RW.   Bed to chair transfer independently Stand Step  using RW.   Car transfer with supervision/touching assist using RW.    an object from the ground in standing position with supervision/touching assist using RW.     Mobility:  Ambulate 150 feet with supervision/touching assist using RW.  Ambulate 10 feet on uneven surfaces/ramps with supervision/touching assist using RW.   Pt ascended/descended a 4 inch curb with supervision/touching assist using RW.   Ascend/descend 4 stairs with supervision/touching assist using bilateral handrails.                          Plan of Care Expires:  09/14/24  PT Next Visit Date: 09/11/24  Plan of Care reviewed with: patient    Additional Information:         Time  Tracking:     Therapy Time  PT Start Time:  (1030; 1330)  PT Stop Time:  (1130; 1430)   PT Individual: 120  Missed Time:    Time Missed due to:      Billable Minutes: Gait Training 90 min and Therapeutic Activity 30 min    09/09/2024

## 2024-09-09 NOTE — PROGRESS NOTES
Inpatient Nutrition Assessment    Admit Date: 9/4/2024   Total duration of encounter: 5 days   Patient Age: 59 y.o.    Nutrition Recommendation/Prescription     Continue Low Sodium, 2 gm, Regular diet as tolerated  Weigh weekly    Communication of Recommendations:  care plan team    Nutrition Assessment     Malnutrition Assessment/Nutrition-Focused Physical Exam    Malnutrition Context: acute illness or injury (09/05/24 1355)  Malnutrition Level:  (Does not meet criteria) (09/05/24 1355)  Energy Intake (Malnutrition):  (Does not meet criteria) (09/05/24 1355)  Weight Loss (Malnutrition):  (Does not meet criteria) (09/05/24 1355)  Subcutaneous Fat (Malnutrition):  (Does not meet criteria) (09/05/24 1355)           Muscle Mass (Malnutrition):  (Does not meet criteria) (09/05/24 1355)                          Fluid Accumulation (Malnutrition):  (Does not meet criteria) (09/05/24 1355)  Hand  Strength, Left (Malnutrition):  (Does not meet criteria) (09/05/24 1355)  Hand  Strength, Right (Malnutrition):  (Does not meet criteria) (09/05/24 1355)  A minimum of two characteristics is recommended for diagnosis of either severe or non-severe malnutrition.    Chart Review    Reason Seen: follow-up    Malnutrition Screening Tool Results   Have you recently lost weight without trying?: No  Have you been eating poorly because of a decreased appetite?: No   MST Score: 0   Diagnosis:  Severe lumbar stenosis with radiculopathy and spondylosis   Bipolar disorder  HLD  HTN  Constipation  BPH    Relevant Medical History:    Anxiety      Chronic right-sided low back pain      Enlarged prostate      Hyperlipidemia      Hypertension      Lumbosacral radiculopathy due to degenerative joint disease of spine      Neuritis or radiculitis due to rupture of lumbar intervertebral disc      Numbness and tingling of both feet      Numbness and tingling of both legs      Obesity      Other spondylosis with radiculopathy, lumbar region         Scheduled Medications:  acetaminophen, 650 mg, TID  atorvastatin, 40 mg, QHS  baclofen, 10 mg, TID  carvediloL, 12.5 mg, BID  dexAMETHasone, 2 mg, Q12H  docusate sodium, 100 mg, BID  enoxparin, 40 mg, Q24H (prophylaxis, 1700)  furosemide (LASIX) injection, 40 mg, Once  hydroCHLOROthiazide, 25 mg, Daily  lisinopriL, 40 mg, Daily  OXcarbazepine, 150 mg, BID  pregabalin, 75 mg, BID  QUEtiapine, 300 mg, Nightly  tamsulosin, 0.4 mg, QHS  venlafaxine, 300 mg, Daily  vitamin renal formula (B-complex-vitamin c-folic acid), 1 capsule, Daily    Continuous Infusions:   PRN Medications:  acetaminophen, 650 mg, Q8H PRN  benzonatate, 100 mg, TID PRN  clonazePAM, 1 mg, BID PRN  diazePAM, 5 mg, Q12H PRN  hydrALAZINE, 10 mg, Q4H PRN  HYDROcodone-acetaminophen, 1 tablet, Daily PRN  hydrOXYzine pamoate, 50 mg, Nightly PRN  labetalol, 10 mg, Q4H PRN  metoprolol, 10 mg, Q2H PRN  nitroGLYCERIN, 0.4 mg, Q5 Min PRN  ondansetron, 8 mg, Q8H PRN  ondansetron, 4 mg, Q8H PRN  oxyCODONE, 10 mg, Q4H PRN  oxyCODONE, 5 mg, Q4H PRN  polyethylene glycol, 17 g, BID PRN    Calorie Containing IV Medications: no significant kcals from medications at this time    Recent Labs   Lab 09/05/24  0512 09/09/24  0531   * 129*   K 4.4 3.6   CALCIUM 8.5* 8.9   PHOS 3.5 3.6   MG 2.20 2.00    92*   CO2 25 28   BUN 15.7 15.5   CREATININE 0.84 0.96   EGFRNORACEVR >60 >60   GLUCOSE 110 107   BILITOT 0.4 0.4   ALKPHOS 118 153*   ALT 23 28   AST 16 19   ALBUMIN 3.0* 3.2*   PREALB 35.3 32.6   TRIG  --  131   WBC 7.04 9.92   HGB 10.3* 10.9*   HCT 31.1* 32.7*     Nutrition Orders:  Diet Adult Regular Low Sodium,2gm      Appetite/Oral Intake: good/% of meals  Factors Affecting Nutritional Intake: constipation  Social Needs Impacting Access to Food: none identified  Food/Taoist/Cultural Preferences: none reported  Food Allergies: no known food allergies  Last Bowel Movement: 09/08/24  Wound(s):  No pressure injuries documented    Comments    9/5/24:  "Pt reports good appetite and intake. Denies issues c/s. Reprots constipation. Continue bowel training program. Last BM today. No n/v. Pt was on Ozempic and was trying to lose weight intentionally but states he gains most of it back.    9/9: Pt with good appetite and intake. Consumes 100% of meals. Constipation resolved. No GI symptoms reported. Med/labs reviewed.  PAB 32.6 (WNL) CBW: 229# (9/7) Loss of 7# (2.9%) in 3 days. Pt receiving Lasix due to edema.    Anthropometrics    Height: 5' 8" (172.7 cm), Height Method: Stated  Last Weight: 104.3 kg (229 lb 15 oz) (09/07/24 0527), Weight Method: Bed Scale  BMI (Calculated): 35  BMI Classification: obese grade II (BMI 35-39.9)        Ideal Body Weight (IBW), Male: 154 lb     % Ideal Body Weight, Male (lb): 153.77 %                          Usual Weight Provided By: patient denies unintentional weight loss    Wt Readings from Last 5 Encounters:   09/07/24 104.3 kg (229 lb 15 oz)   08/21/24 104.3 kg (230 lb)     Weight Change(s) Since Admission: loss of 7# (2.9%) in 3 days (fluid loss)  Wt Readings from Last 1 Encounters:   09/07/24 0527 104.3 kg (229 lb 15 oz)   09/04/24 1559 107.4 kg (236 lb 12.8 oz)   Admit Weight: 107.4 kg (236 lb 12.8 oz) (09/04/24 1559), Weight Method: Bed Scale    Estimated Needs    Weight Used For Calorie Calculations: 107 kg (236 lb)  Energy Calorie Requirements (kcal): 8329-5906 kcal (20-25 kcal/kg)  Energy Need Method: Kcal/kg  Weight Used For Protein Calculations: 107 kg (236 lb)  Protein Requirements:  g (0.8-1.0 g/kg)  Fluid Requirements (mL): 2140 mL/d (1.0 mL/kcal)        Enteral Nutrition     Patient not receiving enteral nutrition at this time.    Parenteral Nutrition     Patient not receiving parenteral nutrition support at this time.    Evaluation of Received Nutrient Intake    Calories: meeting estimated needs  Protein: meeting estimated needs    Patient Education     Not applicable.    Nutrition Diagnosis     PES: " Overweight/obesity related to unknown etiology as evidenced by BMI 36.01. (active)     Nutrition Interventions     Intervention(s): general/healthful diet, multivitamin/mineral supplement therapy, and collaboration with other providers    Goal: Meet greater than 80% of nutritional needs by follow-up. (goal met)  Goal: Verbalize understanding of diet by discharge. (goal progressing)    Nutrition Goals & Monitoring     Dietitian will monitor: food and beverage intake, energy intake, weight, weight change, and gastrointestinal profile  Discharge planning: resume home regimen  Nutrition Risk/Follow-Up: low (follow-up in 5-7 days)   Please consult if re-assessment needed sooner.

## 2024-09-09 NOTE — PLAN OF CARE
Problem: Rehabilitation (IRF) Plan of Care  Goal: Plan of Care Review  Outcome: Progressing  Goal: Patient-Specific Goal (Individualized)  Outcome: Progressing  Goal: Absence of New-Onset Illness or Injury  Outcome: Progressing  Goal: Optimal Comfort and Wellbeing  Outcome: Progressing  Goal: Home and Community Transition Plan Established  Outcome: Progressing     Problem: Wound  Goal: Optimal Coping  Outcome: Progressing  Goal: Optimal Functional Ability  Outcome: Progressing  Goal: Absence of Infection Signs and Symptoms  Outcome: Progressing  Goal: Improved Oral Intake  Outcome: Progressing  Goal: Optimal Pain Control and Function  Outcome: Progressing  Goal: Skin Health and Integrity  Outcome: Progressing  Goal: Optimal Wound Healing  Outcome: Progressing     Problem: Fall Injury Risk  Goal: Absence of Fall and Fall-Related Injury  Outcome: Progressing     Problem: Pain Acute  Goal: Optimal Pain Control and Function  Outcome: Progressing

## 2024-09-10 LAB
ANION GAP SERPL CALC-SCNC: 9 MEQ/L
BUN SERPL-MCNC: 12.7 MG/DL (ref 8.4–25.7)
CALCIUM SERPL-MCNC: 8.7 MG/DL (ref 8.8–10)
CHLORIDE SERPL-SCNC: 96 MMOL/L (ref 98–107)
CO2 SERPL-SCNC: 25 MMOL/L (ref 23–31)
CREAT SERPL-MCNC: 0.73 MG/DL (ref 0.73–1.18)
CREAT/UREA NIT SERPL: 17
GFR SERPLBLD CREATININE-BSD FMLA CKD-EPI: >60 ML/MIN/1.73/M2
GLUCOSE SERPL-MCNC: 138 MG/DL (ref 82–115)
POTASSIUM SERPL-SCNC: 3.5 MMOL/L (ref 3.5–5.1)
SODIUM SERPL-SCNC: 130 MMOL/L (ref 136–145)

## 2024-09-10 PROCEDURE — 63600175 PHARM REV CODE 636 W HCPCS: Performed by: NURSE PRACTITIONER

## 2024-09-10 PROCEDURE — 94640 AIRWAY INHALATION TREATMENT: CPT

## 2024-09-10 PROCEDURE — 25000003 PHARM REV CODE 250: Performed by: NURSE PRACTITIONER

## 2024-09-10 PROCEDURE — 11800000 HC REHAB PRIVATE ROOM

## 2024-09-10 PROCEDURE — 94799 UNLISTED PULMONARY SVC/PX: CPT

## 2024-09-10 PROCEDURE — 97530 THERAPEUTIC ACTIVITIES: CPT | Mod: CQ

## 2024-09-10 PROCEDURE — 25000242 PHARM REV CODE 250 ALT 637 W/ HCPCS: Performed by: NURSE PRACTITIONER

## 2024-09-10 PROCEDURE — 94761 N-INVAS EAR/PLS OXIMETRY MLT: CPT

## 2024-09-10 PROCEDURE — 25000003 PHARM REV CODE 250

## 2024-09-10 PROCEDURE — 36415 COLL VENOUS BLD VENIPUNCTURE: CPT | Performed by: NURSE PRACTITIONER

## 2024-09-10 PROCEDURE — 99900031 HC PATIENT EDUCATION (STAT)

## 2024-09-10 PROCEDURE — 80048 BASIC METABOLIC PNL TOTAL CA: CPT | Performed by: NURSE PRACTITIONER

## 2024-09-10 PROCEDURE — 25000003 PHARM REV CODE 250: Performed by: PHYSICAL MEDICINE & REHABILITATION

## 2024-09-10 PROCEDURE — 99233 SBSQ HOSP IP/OBS HIGH 50: CPT | Mod: ,,, | Performed by: NURSE PRACTITIONER

## 2024-09-10 RX ORDER — BACLOFEN 5 MG/1
10 TABLET ORAL 4 TIMES DAILY
Status: DISCONTINUED | OUTPATIENT
Start: 2024-09-10 | End: 2024-09-13 | Stop reason: HOSPADM

## 2024-09-10 RX ORDER — FLUTICASONE PROPIONATE 50 MCG
2 SPRAY, SUSPENSION (ML) NASAL DAILY
Status: DISCONTINUED | OUTPATIENT
Start: 2024-09-10 | End: 2024-09-13 | Stop reason: HOSPADM

## 2024-09-10 RX ORDER — CETIRIZINE HYDROCHLORIDE 10 MG/1
10 TABLET ORAL
Status: DISCONTINUED | OUTPATIENT
Start: 2024-09-10 | End: 2024-09-13 | Stop reason: HOSPADM

## 2024-09-10 RX ORDER — ACETYLCYSTEINE 200 MG/ML
2 SOLUTION ORAL; RESPIRATORY (INHALATION) 3 TIMES DAILY
Status: DISCONTINUED | OUTPATIENT
Start: 2024-09-10 | End: 2024-09-12

## 2024-09-10 RX ORDER — IPRATROPIUM BROMIDE AND ALBUTEROL SULFATE 2.5; .5 MG/3ML; MG/3ML
3 SOLUTION RESPIRATORY (INHALATION) EVERY 4 HOURS PRN
Status: DISCONTINUED | OUTPATIENT
Start: 2024-09-10 | End: 2024-09-13 | Stop reason: HOSPADM

## 2024-09-10 RX ORDER — ACETYLCYSTEINE 200 MG/ML
4 SOLUTION ORAL; RESPIRATORY (INHALATION) 3 TIMES DAILY
Status: DISCONTINUED | OUTPATIENT
Start: 2024-09-10 | End: 2024-09-10

## 2024-09-10 RX ADMIN — DEXAMETHASONE 2 MG: 2 TABLET ORAL at 08:09

## 2024-09-10 RX ADMIN — ACETAMINOPHEN 650 MG: 325 TABLET ORAL at 08:09

## 2024-09-10 RX ADMIN — QUETIAPINE 400 MG: 200 TABLET ORAL at 09:09

## 2024-09-10 RX ADMIN — ACETAMINOPHEN 650 MG: 325 TABLET ORAL at 01:09

## 2024-09-10 RX ADMIN — FLUTICASONE PROPIONATE 100 MCG: 50 SPRAY, METERED NASAL at 11:09

## 2024-09-10 RX ADMIN — ENOXAPARIN SODIUM 40 MG: 40 INJECTION SUBCUTANEOUS at 05:09

## 2024-09-10 RX ADMIN — BACLOFEN 10 MG: 5 TABLET ORAL at 01:09

## 2024-09-10 RX ADMIN — CARVEDILOL 12.5 MG: 6.25 TABLET, FILM COATED ORAL at 08:09

## 2024-09-10 RX ADMIN — PREGABALIN 75 MG: 75 CAPSULE ORAL at 08:09

## 2024-09-10 RX ADMIN — OXYCODONE HYDROCHLORIDE 10 MG: 5 TABLET ORAL at 05:09

## 2024-09-10 RX ADMIN — ACETYLCYSTEINE 4 ML: 200 INHALANT RESPIRATORY (INHALATION) at 03:09

## 2024-09-10 RX ADMIN — DOCUSATE SODIUM 100 MG: 100 CAPSULE, LIQUID FILLED ORAL at 08:09

## 2024-09-10 RX ADMIN — TAMSULOSIN HYDROCHLORIDE 0.4 MG: 0.4 CAPSULE ORAL at 08:09

## 2024-09-10 RX ADMIN — Medication 1 CAPSULE: at 08:09

## 2024-09-10 RX ADMIN — OXYCODONE HYDROCHLORIDE 10 MG: 5 TABLET ORAL at 02:09

## 2024-09-10 RX ADMIN — CETIRIZINE HYDROCHLORIDE 10 MG: 10 TABLET, FILM COATED ORAL at 05:09

## 2024-09-10 RX ADMIN — ACETAMINOPHEN 650 MG: 325 TABLET ORAL at 05:09

## 2024-09-10 RX ADMIN — LAMOTRIGINE 25 MG: 25 TABLET ORAL at 08:09

## 2024-09-10 RX ADMIN — BACLOFEN 10 MG: 5 TABLET ORAL at 05:09

## 2024-09-10 RX ADMIN — ATORVASTATIN CALCIUM 40 MG: 40 TABLET, FILM COATED ORAL at 08:09

## 2024-09-10 RX ADMIN — OXYCODONE HYDROCHLORIDE 10 MG: 5 TABLET ORAL at 08:09

## 2024-09-10 RX ADMIN — HYDROCHLOROTHIAZIDE 25 MG: 25 TABLET ORAL at 08:09

## 2024-09-10 RX ADMIN — LISINOPRIL 40 MG: 10 TABLET ORAL at 08:09

## 2024-09-10 RX ADMIN — DIAZEPAM 5 MG: 5 TABLET ORAL at 08:09

## 2024-09-10 RX ADMIN — IPRATROPIUM BROMIDE AND ALBUTEROL SULFATE 3 ML: 2.5; .5 SOLUTION RESPIRATORY (INHALATION) at 03:09

## 2024-09-10 RX ADMIN — BACLOFEN 10 MG: 5 TABLET ORAL at 11:09

## 2024-09-10 RX ADMIN — ACETYLCYSTEINE 2 ML: 200 INHALANT RESPIRATORY (INHALATION) at 08:09

## 2024-09-10 RX ADMIN — PREGABALIN 75 MG: 75 CAPSULE ORAL at 01:09

## 2024-09-10 RX ADMIN — IPRATROPIUM BROMIDE AND ALBUTEROL SULFATE 3 ML: 2.5; .5 SOLUTION RESPIRATORY (INHALATION) at 08:09

## 2024-09-10 RX ADMIN — PREGABALIN 75 MG: 75 CAPSULE ORAL at 05:09

## 2024-09-10 RX ADMIN — BENZONATATE 100 MG: 100 CAPSULE ORAL at 02:09

## 2024-09-10 NOTE — PT/OT/SLP PROGRESS
Occupational Therapy      Patient Name:  Lionel Nix   MRN:  [id number]    Patient not seen today secondary to Patient ill (Comment), Patient unwilling to participate.     Amount of therapy minutes missed:  60 Minutes.    Will follow-up when available.    9/10/2024

## 2024-09-10 NOTE — PROGRESS NOTES
Dos 9/10/24  C/o increased cramping- will increase baclofen to qid  Patient seen and evaluated in room today.  Therapy and progress discussed with patient  Reviewed present barriers to progress  Reviewed chart  Discussed with Dr [facility] medicine team, nursing staff as well as my NP, Marilyn Gonsalez  Agree with present POC  Subjective  HPI:  58 yo WM with a PMH of HTN, HLD, severe lumbar stenosis with lumbosacral radiculopathy and degenerative joint disease of lumbar spine was admitted to [facility] on 8/19/24, and underwent a TLIF L2-S1 decompression and fusion by Dr. August. Drain was placed to site. Patient placed on spinal precautions with LSO brace. On 8/20, PT/OT evals completed with impairments noted of weakness, impaired balance, orthopedic precautions, pain, impaired self care skills, impaired endurance, and impairedfunctional mobility. Labs showed low RBC of 3.09, low H&H of 9.8 & 27.6, low potassium of 3.0, elevated glucose of 118, low calcium of 7.8. On 8/21, patient complained of right posterior leg pain which worsened with sitting. TRAVIS drain remained in place. Decadron was increased. On 8/22, patient complained of continued leg pain and numbness so CT of lumbar spine was completed which showed postoperative changes without evidence of acute abnormality. Patient had continued buckling of BLE with therapy noted with high risk for falls. On 8/23, TRAVIS drain had 20ml output so drain was removed. Patient reported depression due to current situation so pyanne was consulted. On 8/24, psych was consulted for depression and anxiety with recommendation to continue Effexor XR 300mg daily, continue seroquel 300mg at bedtime, added Trileptal 150mg BID, added Ambien PRN, and added Hydroxyzine 25mg TID PRN intense anxiety episodes. Patient will also need outpatient followup with therapist. On 8/25, patient fell after using restroom, Dr August was notified of fall, and Xrays ordered. On 8/26, Lumbar spine XR showed  Postoperative changes of posterior fusion with intact hardware. Sacrum XR showed No fractures or dislocations are clearly identified, Degenerative changes. Patient complained of back pain rating at 10 on 1-10 scale. Left knee XR showed no acute abnormalities seen. On 8/27, Cervical XR showed Limited exam with some degenerative changes and anterolisthesis as described above but essentially nondiagnostic other imaging modalities and or repeat exam is suggested. On 8/28, Patient BP uncontrolled so hospitialist was consulted for hypertension management. Patient was started on Metoprolol 50mg PO BID. On 8/29, BP remained elevated so increased Lisinopril to 40mg, changed Metoprolol to Carvedilol, and continued HCTZ. Lyrica was also added due to uncontrolled pain, and side effects of previously trialed gabapentin. On 8/30, WBC elevated so ordered Chest XR which was negative for acute findings. D-Dimer was elevated at 0.55, low Na noted at 134. On 9/1, labs showed elevated WBC of 12.40, low H&H of 9.9 & 30.1, low NA of 135, elevated glucose of 126, low calcium of 8.2. On 9/2, MRI of cervical spine was completed with No acute traumatic findings identified; Degenerative listhesis at C4-5 and C6-7. On 9/4, neurosurgeon reviewed MRI with no surgical intervention planned, OK to continue to mobilize, and OK transfer to rehab. Patient is AAOx4.  Participating with therapy. Functional status includes minimal assist needed for transfers with RW, walked 50ft with RW at minimal to moderate assist with episode of knee buckling noted, setup assist for grooming while seated, modified independent for toilet hygiene but required moderate assist for clothing management, minimal assist for lower body dressing and modified independent for socks with reacher, and setup for eating.Patient was evaluated, accepted, and admitted to inpatient rehab to improve functional status. Transferred to [facility] on 9/4 without incident.     9/10: Seen in patient  "room, seated in WC with chin tucked and snoring. Easily arousable. Staff reports patient having slept much better overnight, but found brown liquid in his room in his personal jug that smelled like whiskey. No visible alcohol container in room. Admits to sleeping good last night, but states that he is not feeling good this morning and feels like he should get back in bed. When asked about pain, he rates 9/10 pain radiating down into his knees and 10/10 back pain. Encouraged to get up and moving with therapy to assist with pain, but patient continues to refuse. States that his sinuses are acting up and his throat is hurting. Discussed medications/treatment. Psych consulted yesterday 2/2 manic symptoms and medications adjusted. Following. VSSAF.       Review of Systems  Psychiatric: Has a history of bipolar disorder, anxiety, depression, and claustrophobia.      Depression/Anxiety:  denies depression. States that he takes Seroquel every day. Home psych medications reviewed, continued,  and Clonazepam added PRN Anxiety (home med).    DC OXcarbazepine tablet 150 mg BID  QUEtiapine tablet 300 mg qHS. Increased 400mg  DC venlafaxine 24 hr capsule 300 mg qd  DC clonazePAM tablet 1 mg BID PRN Anxiety  ADD lamoTRIgine tablet 25 mg qd  diazePAM tablet 5 mg q8h PRN Anxiety  Pain: back pain radiating down hips and legs, numbness/tingling to BLE     acetaminophen tablet 650 mg TID  baclofen tablet 10 mg TID  dexAMETHasone tablet 2 mg q12h  pregabalin capsule 75 mg BID. Increase to TID  acetaminophen tablet 650 mg q8h PRN mild pain  oxyCODONE immediate release tablet 5 mg q4h PRN mod pain  oxyCODONE immediate release tablet 10 mg q4h PRN severe pain  Bowels/Bladder: last BM 9/4 per patient     Appetite:  good   Sleep: not falling asleep-much improved   QUEtiapine tablet 300 mg qHS. Increased 400mg  hydrOXYzine pamoate capsule 50 mg qHS PRN Insomnia          Physical Exam  General: well-developed, well-nourished, lethargic, "not " "feeling well"  Respiratory: equal chest rise, no SOB, no audible wheeze  Cardiovascular: regular rate and rhythm, no edema  Gastrointestinal: soft, non-tender, non-distended   Musculoskeletal: BLE weakness  Integumentary: no rashes or skin lesions present  Neurologic: cranial nerves intact, lumbar radiculopathy  *MD performed and documented physical examination           Labs:   Latest Reference Range & Units 09/10/24 04:57   Sodium 136 - 145 mmol/L 130 (L)   Potassium 3.5 - 5.1 mmol/L 3.5   Chloride 98 - 107 mmol/L 96 (L)   CO2 23 - 31 mmol/L 25   Anion Gap mEq/L 9.0   BUN 8.4 - 25.7 mg/dL 12.7   Creatinine 0.73 - 1.18 mg/dL 0.73   BUN/CREAT RATIO  17   eGFR mL/min/1.73/m2 >60   Glucose 82 - 115 mg/dL 138 (H)   Calcium 8.8 - 10.0 mg/dL 8.7 (L)   (L): Data is abnormally low  (H): Data is abnormally high                  Assessment/Plan  Hospital   Neuritis or radiculitis due to rupture of lumbar intervertebral disc   Other spondylosis with radiculopathy, lumbar region   Lumbosacral radiculopathy due to degenerative joint disease of spine   Radiculopathy of sacral region   S/P lumbar spinal fusion     Non-Hospital   Chronic midline low back pain without sciatica   Benign prostatic hyperplasia with nocturia   Claudication   Cluster B personality disorder   External hemorrhoid, bleeding   Former smoker, stopped smoking many years ago   ADALI (generalized anxiety disorder)   History of bipolar disorder   History of colon polyps   Essential hypertension   Hyperlipidemia   IFG (impaired fasting glucose)   Palpitations   Polyarthralgia   Prediabetes   Tobacco use disorder, moderate, in sustained remission       Wounds: back igtimosw-[id number]-a/d/I  S/p TLIF L2-S1 decompression and fusion on 8/19, by Dr. August. Drain was placed to site  Precautions: spinal  Bracing: LSO  Swallowing: Regular Diet (Low Sodium)  Function: Tolerating therapy. Continue PT/OT  VTE Prophylaxis:   enoxaparin injection 40 mg SubQ q24h  Code " Status: FULL CODE   Discharge: Plans to live with his daughter in [place] in a single-story home with 1 threshold step to enter the residence. Completed 9th grade. He has no  history. Prior to 7 months ago, pt. Was working full time offshore. Wife and their daughter manage the home, cook, grocery shop, do laundry, and clean.  Reports that he is  from his wife.  He stated that they split up and had to sell their home.  They were living in [place].  He will be going to live with one of his daughters in [place].  If that does not work out, he will move in with his sister in the [facility]. Children: (3). Date pending.                Kelsey Gonsalez NP, conducted additional independent physical examination and assisted with medical documentation.

## 2024-09-10 NOTE — PT/OT/SLP PROGRESS
Physical Therapy Inpatient Rehab Treatment    Patient Name:  Lionel Nix   MRN:  [id number]    Recommendations:     Discharge Recommendations:  Low Intensity Therapy   Discharge Equipment Recommendations:     Barriers to discharge: Impaired functional mobility     Assessment:     Lionel Nix is a 59 y.o. male admitted with a medical diagnosis of S/P lumbar spinal fusion.  He presents with the following impairments/functional limitations:  weakness, impaired endurance, impaired functional mobility, gait instability, impaired balance, decreased safety awareness, pain, decreased ROM, orthopedic precautions .    Pt found asleep up in chair. Pt stated he is not feeling well and thinks he is coming down with a cold and can't do therapy today. Pt requested to get back in bed to relax.     Rehab Diagnosis: Severe lumbar stenosis with radiculopathy and spondylosis s/p TLIF L2-S1 decompression and fusion 8/19/2024. Pt. Has a past medical history of HTN, HLD, severe lumbar stenosis with lumbosacral radiculopathy and degenerative joint disease of the lumbar spine.    General Precautions: Standard, fall     Orthopedic Precautions:spinal precautions     Braces: LSO    Rehab Prognosis: Poor; patient would benefit from acute skilled PT services to address these deficits and reach maximum level of function.      History:     Past Medical History:   Diagnosis Date    Anxiety     Chronic right-sided low back pain     Enlarged prostate     Hyperlipidemia     Hypertension     Lumbosacral radiculopathy due to degenerative joint disease of spine     Neuritis or radiculitis due to rupture of lumbar intervertebral disc     Numbness and tingling of both feet     Numbness and tingling of both legs     Obesity     Other spondylosis with radiculopathy, lumbar region        Past Surgical History:   Procedure Laterality Date    BACK SURGERY      COLONOSCOPY      HERNIA REPAIR      SHOULDER SURGERY Left     TRANSFORAMINAL LUMBAR INTERBODY FUSION  "(TLIF) USING COMPUTER-ASSISTED NAVIGATION Left 8/19/2024    Procedure: FUSION, SPINE, LUMBAR, TLIF, USING COMPUTER-ASSISTED NAVIGATION;  Surgeon: aCr August MD;  Location: [facility];  Service: Neurosurgery;  Laterality: Left;  L2 - S1 TRANSFORAMINAL INTERBODY FUSION / POSTERIOR LATERAL FUSION // PRONE PEDRO // DRILL // MICROSCOPE // O-ARM // DIRECT SPINE // NDM       Subjective     Patient comments: "not feeling well"    Respiratory Status: Room air    Patients cultural, spiritual, Sabianist conflicts given the current situation: no    Objective:    Patient found up in chair with peripheral IV  upon PT entry to room.    Functional Mobility:        Current   Status  Discharge   Goal   Functional Area: Care Score:    Roll Left and Right   Independent   Sit to Lying 4  Vc to maintain spinal px Independent   Lying to Sitting on Side of Bed   Independent   Sit to Stand 4 Independent   Chair/Bed-to-Chair Transfer 4  W/rw and cga for safety.  Independent   Car Transfer   Independent   Walk 10 Feet   Independent   Walk 50 Feet with Two Turns   Independent   Walk 150 Feet   Independent   Walk 10 Feet Uneven Surface   Independent   1 Step (Curb)   Independent   4 Steps   Independent   12 Steps   Supervision or touching assistance   Picking Up Object   Independent   Wheel 50 Feet with Two Turns   Not applicable   Wheel 150 Feet   Not applicable       Activity Tolerance: Poor    Patient left supine with all lines intact, call button in reach, and bed alarm on.    Education provided: transfer training    Expected compliance: Low compliance    Plan:     During this hospitalization, patient to be seen 5 x/week to address the identified rehab impairments via gait training, therapeutic activities, therapeutic exercises and progress toward the following goals:    GOALS:   Multidisciplinary Problems       Physical Therapy Goals          Problem: Physical Therapy    Goal Priority Disciplines Outcome Goal Variances Interventions "   Physical Therapy Goal     PT, PT/OT Progressing     Description: Bed Mobility:  Roll left and right independently.   Sit to supine transfer independently.   Supine to sit transfer independently.     Transfers:  Sit to stand transfer independently using RW.   Bed to chair transfer independently Stand Step  using RW.   Car transfer with supervision/touching assist using RW.    an object from the ground in standing position with supervision/touching assist using RW.     Mobility:  Ambulate 150 feet with supervision/touching assist using RW.  Ambulate 10 feet on uneven surfaces/ramps with supervision/touching assist using RW.   Pt ascended/descended a 4 inch curb with supervision/touching assist using RW.   Ascend/descend 4 stairs with supervision/touching assist using bilateral handrails.                          Plan of Care Expires:  09/14/24  PT Next Visit Date: 09/11/24  Plan of Care reviewed with: patient    Additional Information:         Time Tracking:     Therapy Time  PT Received On: 09/10/24  PT Start Time: 1030  PT Stop Time: 1200  PT Total Time (min): 90 min   PT Individual: 10  Missed Time: 80 Minutes  Time Missed due to: Patient ill (Comment), Patient fatigue, Patient unwilling to participate    Billable Minutes: Therapeutic Activity 10    09/10/2024

## 2024-09-10 NOTE — PT/OT/SLP PROGRESS
"Occupational Therapy      Patient Name:  Lionel Nix   MRN:  [id number]    Patient not seen today secondary to Patient ill (Comment), Patient unwilling to participate. OT attempted to co-Tx Yanet Ayala, who was already present in room as well as MELODIE Hutchinson. Pt. Stated he's "not feeling well today."     Amount of therapy minutes missed:  30 min    Will follow-up when available. .    9/10/2024  "

## 2024-09-10 NOTE — PLAN OF CARE
Problem: Fall Injury Risk  Goal: Absence of Fall and Fall-Related Injury  Outcome: Progressing  Intervention: Identify and Manage Contributors  Flowsheets (Taken 9/10/2024 1329)  Self-Care Promotion:   independence encouraged   BADL personal objects within reach  Medication Review/Management: medications reviewed  Intervention: Promote Injury-Free Environment  Flowsheets (Taken 9/10/2024 1329)  Safety Promotion/Fall Prevention:   assistive device/personal item within reach   bed alarm refused   chair alarm set   Fall Risk reviewed with patient/family   Fall Risk signage in place   gait belt with ambulation   nonskid shoes/socks when out of bed   medications reviewed

## 2024-09-10 NOTE — PROGRESS NOTES
09/10/24 1400   Rec Therapy Time Calculation   Date of Treatment 09/10/24   Rec Start Time 1400   Rec Stop Time 1430   Rec Total Time (min) 30 min   Amount of Missed Time 30 Minutes   Missed Treatment Reason Patient unwilling to participate;Other (Comment)  (Pt said he wasn't feeling well)

## 2024-09-10 NOTE — NURSING
Nurses Note -- 4 Eyes      9/9/2024   7:20 PM      Skin assessed during: Q Shift Change      [] No Altered Skin Integrity Present    []Prevention Measures Documented      [x] Yes- Altered Skin Integrity Present or Discovered   [] LDA Added if Not in Epic (Describe Wound)   [] New Altered Skin Integrity was Present on Admit and Documented in LDA   [] Wound Image Taken    Wound Care Consulted? Yes    Attending Nurse:  Nancy Santoro RN/Staff Member:  yes

## 2024-09-10 NOTE — NURSING
Nurses Note -- 4 Eyes      9/10/2024   1:31 PM      Skin assessed during: Q Shift Change      [] No Altered Skin Integrity Present    []Prevention Measures Documented      [x] Yes- Altered Skin Integrity Present or Discovered   [] LDA Added if Not in Epic (Describe Wound)   [x] New Altered Skin Integrity was Present on Admit and Documented in LDA   [] Wound Image Taken    Wound Care Consulted? No    Attending Nurse:  Evangelina Santoro RN/Staff Member:  MELODIE Cruz

## 2024-09-10 NOTE — PROGRESS NOTES
9/10/2024  Lionel Nix   1965   [id number]        Psychiatry Progress Note       SUBJECTIVE:   Lionel Nix is a 59 y.o. male with a past medical history that includes HTN, HLD, severe lumbar stenosis with lumbosacral radiculopathy and degenerative joint disease of the lumbar spine who was admitted to [facility] on 08/19/24 and underwent a TLIF L2-SA decompression and fusion. Presented to inpatient rehab to improve functional status at [facility] on 09/04/24. Was seen by psychiatry during initial hospitalization on 08/24/24 due to depression. Psychiatry consulted due to symptoms of herrera.    Seen at the bedside where he is awake, alert, pleasant, and cooperative. Staff reports he has been drowsy for a large portion of the day. He reports no issues with sleep last night. Effexor stopped yesterday and he denies signs/symptoms of withdrawal. Reports tolerating medication changes without adverse effect. Remains talkative, but is interruptable and seems more subdued today compared to yesterday. Reports euthymic mood at this time. Denies feeling depressed/sad, anxious/nervous, or irritable/angry. No evidence of psychosis at this time. Denies suicidal ideations, homicidal ideations, or auditory/visual hallucinations.        Current Medications:   Scheduled Meds:    acetaminophen  650 mg Oral TID    acetylcysteine 200 mg/ml (20%)  2 mL Nebulization TID    atorvastatin  40 mg Oral QHS    baclofen  10 mg Oral QID    carvediloL  12.5 mg Oral BID    cetirizine  10 mg Oral After dinner    dexAMETHasone  2 mg Oral Q12H    docusate sodium  100 mg Oral BID    enoxparin  40 mg Subcutaneous Q24H (prophylaxis, 1700)    fluticasone propionate  2 spray Each Nostril Daily    hydroCHLOROthiazide  25 mg Oral Daily    lamoTRIgine  25 mg Oral Daily    lisinopriL  40 mg Oral Daily    pregabalin  75 mg Oral TID    QUEtiapine  400 mg Oral Nightly    tamsulosin  0.4 mg Oral QHS    vitamin renal formula (B-complex-vitamin c-folic acid)  1 capsule Oral  Daily      PRN Meds:   Current Facility-Administered Medications:     acetaminophen, 650 mg, Oral, Q8H PRN    albuterol-ipratropium, 3 mL, Nebulization, Q4H PRN    benzonatate, 100 mg, Oral, TID PRN    diazePAM, 5 mg, Oral, Q8H PRN    hydrALAZINE, 10 mg, Intravenous, Q4H PRN    HYDROcodone-acetaminophen, 1 tablet, Oral, Daily PRN    labetalol, 10 mg, Intravenous, Q4H PRN    metoprolol, 10 mg, Intravenous, Q2H PRN    nitroGLYCERIN, 0.4 mg, Sublingual, Q5 Min PRN    ondansetron, 8 mg, Oral, Q8H PRN    ondansetron, 4 mg, Intravenous, Q8H PRN    oxyCODONE, 10 mg, Oral, Q4H PRN    oxyCODONE, 5 mg, Oral, Q4H PRN    polyethylene glycol, 17 g, Oral, BID PRN   Psychotherapeutics (From admission, onward)      Start     Stop Route Frequency Ordered    09/09/24 2100  QUEtiapine tablet 400 mg         -- Oral Nightly 09/09/24 1525    09/09/24 1629  diazePAM tablet 5 mg         -- Oral Every 8 hours PRN 09/09/24 1529            Allergies:   Review of patient's allergies indicates:   Allergen Reactions    Gabapentin Anxiety and Palpitations        OBJECTIVE:   Vitals   Vitals:    09/10/24 1507   BP:    Pulse: 83   Resp: 18   Temp:         Labs/Imaging/Studies:   Recent Results (from the past 36 hour(s))   Comprehensive Metabolic Panel    Collection Time: 09/09/24  5:31 AM   Result Value Ref Range    Sodium 129 (L) 136 - 145 mmol/L    Potassium 3.6 3.5 - 5.1 mmol/L    Chloride 92 (L) 98 - 107 mmol/L    CO2 28 23 - 31 mmol/L    Glucose 107 82 - 115 mg/dL    Blood Urea Nitrogen 15.5 8.4 - 25.7 mg/dL    Creatinine 0.96 0.73 - 1.18 mg/dL    Calcium 8.9 8.8 - 10.0 mg/dL    Protein Total 6.3 5.8 - 7.6 gm/dL    Albumin 3.2 (L) 3.4 - 4.8 g/dL    Globulin 3.1 2.4 - 3.5 gm/dL    Albumin/Globulin Ratio 1.0 (L) 1.1 - 2.0 ratio    Bilirubin Total 0.4 <=1.5 mg/dL     (H) 40 - 150 unit/L    ALT 28 0 - 55 unit/L    AST 19 5 - 34 unit/L    eGFR >60 mL/min/1.73/m2    Anion Gap 9.0 mEq/L    BUN/Creatinine Ratio 16    Lipid Panel    Collection  Time: 09/09/24  5:31 AM   Result Value Ref Range    Cholesterol Total 154 <=200 mg/dL    HDL Cholesterol 67 (H) 35 - 60 mg/dL    Triglyceride 131 34 - 140 mg/dL    Cholesterol/HDL Ratio 2 0 - 5    Very Low Density Lipoprotein 26     LDL Cholesterol 61.00 50.00 - 140.00 mg/dL   Prealbumin    Collection Time: 09/09/24  5:31 AM   Result Value Ref Range    Prealbumin 32.6 16.0 - 42.0 mg/dL   Magnesium    Collection Time: 09/09/24  5:31 AM   Result Value Ref Range    Magnesium Level 2.00 1.60 - 2.60 mg/dL   Phosphorus    Collection Time: 09/09/24  5:31 AM   Result Value Ref Range    Phosphorus Level 3.6 2.3 - 4.7 mg/dL   CBC with Differential    Collection Time: 09/09/24  5:31 AM   Result Value Ref Range    WBC 9.92 4.50 - 11.50 x10(3)/mcL    RBC 3.57 (L) 4.70 - 6.10 x10(6)/mcL    Hgb 10.9 (L) 14.0 - 18.0 g/dL    Hct 32.7 (L) 42.0 - 52.0 %    MCV 91.6 80.0 - 94.0 fL    MCH 30.5 27.0 - 31.0 pg    MCHC 33.3 33.0 - 36.0 g/dL    RDW 12.8 11.5 - 17.0 %    Platelet 191 130 - 400 x10(3)/mcL    MPV 8.7 7.4 - 10.4 fL    Neut % 81.9 %    Lymph % 10.6 %    Mono % 6.8 %    Eos % 0.1 %    Basophil % 0.1 %    Lymph # 1.05 0.6 - 4.6 x10(3)/mcL    Neut # 8.13 2.1 - 9.2 x10(3)/mcL    Mono # 0.67 0.1 - 1.3 x10(3)/mcL    Eos # 0.01 0 - 0.9 x10(3)/mcL    Baso # 0.01 <=0.2 x10(3)/mcL    IG# 0.05 (H) 0 - 0.04 x10(3)/mcL    IG% 0.5 %    NRBC% 0.0 %   Basic Metabolic Panel    Collection Time: 09/10/24  4:57 AM   Result Value Ref Range    Sodium 130 (L) 136 - 145 mmol/L    Potassium 3.5 3.5 - 5.1 mmol/L    Chloride 96 (L) 98 - 107 mmol/L    CO2 25 23 - 31 mmol/L    Glucose 138 (H) 82 - 115 mg/dL    Blood Urea Nitrogen 12.7 8.4 - 25.7 mg/dL    Creatinine 0.73 0.73 - 1.18 mg/dL    BUN/Creatinine Ratio 17     Calcium 8.7 (L) 8.8 - 10.0 mg/dL    Anion Gap 9.0 mEq/L    eGFR >60 mL/min/1.73/m2        Psychiatric Mental Status Exam:  General Appearance: appears stated age, dressed in casual attire, in no acute distress  Arousal: alert  Behavior:  cooperative, polite, appropriate eye-contact, under good behavioral control  Movements and Motor Activity: no tics, no tremors, no akathisia, no dystonia, no evidence of tardive dyskinesia  Orientation: oriented to person, place, time, and situation  Speech: spontaneous, coherent, talkative  Mood: Euthymic  Affect: euthymic, reactive, full-range, mood-congruent  Thought Process: circumstantial  Associations: no loosening of associations  Thought Content and Perceptions: no suicidal or homicidal ideation, no auditory or visual hallucinations, no paranoid ideation, no ideas of reference, no evidence of delusions or psychosis  Recent and Remote Memory: grossly intact; per interview/observation with patient  Attention and Concentration: grossly intact; per interview/observation with patient  Fund of Knowledge: grossly intact; based on history, vocabulary, fund of knowledge, syntax, grammar, and content  Insight: adequate; based on understanding of severity of illness and HPI  Judgment: adequate; based on patient's behavior and HPI    ASSESSMENT/PLAN:   Problems Addressed/Diagnoses:  Bipolar I Disorder, Most Recent Episode Mixed: Moderate (F31.62)            Past Medical History:   Diagnosis Date    Anxiety     Chronic right-sided low back pain     Enlarged prostate     Hyperlipidemia     Hypertension     Lumbosacral radiculopathy due to degenerative joint disease of spine     Neuritis or radiculitis due to rupture of lumbar intervertebral disc     Numbness and tingling of both feet     Numbness and tingling of both legs     Obesity     Other spondylosis with radiculopathy, lumbar region         Plan:  Medication Management  Quetiapine 400mg PO QHS  Lamictal 25mg PO QD  Legal   PEC not recommended at this time  Will continue to follow        Virgil Chaudhari

## 2024-09-10 NOTE — PROGRESS NOTES
[facility] ([facility])  Rehab Progress Note    Patient Name: Lionel Nix  MRN: [id number]  Age: 59 y.o. Sex: male  : 1965  Hospital Length of Stay: 6 days  Date of Service: 9/10/2024   Chief Complaint: Severe lumbar stenosis with radiculopathy and spondylosis s/p TLIF L2-S1 decompression and L3-S1 fusion on      Subjective:     Basic Information  Admit Information: 59-year-old male presented to [facility] on  for scheduled lumbar decompression and fusion. PMH significant for hypertension, hyperlipidemia, anxiety, depression.  On  tolerated TLIF L2-S1 decompression and L3-S1 fusion without perioperative complications.  On  discontinued TRAVIS drain.  Postop CT scan with good placement of hardware.  Psychiatry evaluated on .  Continued Effexor, Seroquel, and initiated Trileptal.  Continued with postoperative pain.  Lyrica initiated along with increase in hypertensive medications.  Reported fall on .  MRI cervical spine with no acute traumatic findings identified and degenerative listhesis at C4-5 and C6-7.  No neurosurgical interventions planned for C-spine. Tolerated transfer to [facility] inpatient rehab unit on  without incident.   Today's Information: No acute events overnight.  Lying in bed.  Reports good sleep and appetite.  Last BM .  Vital signs at goal with no recorded fevers.  Sodium 130-trending up.  BMP otherwise unremarkable.  Complains of cough and congestion.  No new imaging today.    Review of patient's allergies indicates:   Allergen Reactions    Gabapentin Anxiety and Palpitations        Current Facility-Administered Medications:     acetaminophen tablet 650 mg, 650 mg, Oral, TID, Kelsey Gonsalez A, FNP, 650 mg at 09/10/24 0548    acetaminophen tablet 650 mg, 650 mg, Oral, Q8H PRN, Kelsey Gonsalez A, FNP    atorvastatin tablet 40 mg, 40 mg, Oral, QHS, Carter Jha, FNP, 40 mg at 24    baclofen tablet 10 mg, 10 mg, Oral, TID,  Alivia Garcia FNP, 10 mg at 09/10/24 0549    benzonatate capsule 100 mg, 100 mg, Oral, TID PRN, Carter Jha FNP, 100 mg at 09/09/24 2059    carvediloL tablet 12.5 mg, 12.5 mg, Oral, BID, Alivia Garcia FNP, 12.5 mg at 09/10/24 0811    cetirizine tablet 10 mg, 10 mg, Oral, After dinner, Kelsey Gonsalez FNP    dexAMETHasone tablet 2 mg, 2 mg, Oral, Q12H, Alivia Garcia FNP, 2 mg at 09/10/24 0811    diazePAM tablet 5 mg, 5 mg, Oral, Q8H PRN, Kelsey Gonsalez FNP    docusate sodium capsule 100 mg, 100 mg, Oral, BID, Carter Jha FNP, 100 mg at 09/10/24 0812    enoxaparin injection 40 mg, 40 mg, Subcutaneous, Q24H (prophylaxis, 1700), Carter Jha FNP, 40 mg at 09/09/24 1612    fluticasone propionate 50 mcg/actuation nasal spray 100 mcg, 2 spray, Each Nostril, Daily, Kelsey Gonsalez FNP    hydrALAZINE injection 10 mg, 10 mg, Intravenous, Q4H PRN, Carter Jha FNP    hydroCHLOROthiazide tablet 25 mg, 25 mg, Oral, Daily, Alivia Garcia FNP, 25 mg at 09/10/24 0811    HYDROcodone-acetaminophen 5-325 mg per tablet 1 tablet, 1 tablet, Oral, Daily PRN, Carter Jha FNP, 1 tablet at 09/09/24 0754    labetalol 20 mg/4 mL (5 mg/mL) IV syring, 10 mg, Intravenous, Q4H PRN, Carter Jha FNP, 10 mg at 09/06/24 2149    lamoTRIgine tablet 25 mg, 25 mg, Oral, Daily, Virgil Chaudhari NP, 25 mg at 09/10/24 0811    lisinopriL tablet 40 mg, 40 mg, Oral, Daily, Alivia Garcia FNP, 40 mg at 09/10/24 0812    metoprolol injection 10 mg, 10 mg, Intravenous, Q2H PRN, Carter Jha FNP    nitroGLYCERIN SL tablet 0.4 mg, 0.4 mg, Sublingual, Q5 Min PRN, Carter Jha FNP    ondansetron disintegrating tablet 8 mg, 8 mg, Oral, Q8H PRN, Carter Jha FNP    ondansetron injection 4 mg, 4 mg, Intravenous, Q8H PRN, Carter Jha FNP    oxyCODONE immediate release tablet 10 mg, 10 mg, Oral, Q4H PRN, Kelsey Gonsalez FNP, 10 mg at 09/10/24  "0548    oxyCODONE immediate release tablet 5 mg, 5 mg, Oral, Q4H PRN, Chuckie Gonsalezyn A, FNP    polyethylene glycol packet 17 g, 17 g, Oral, BID PRN, Carter Jha A, FNP, 17 g at 09/07/24 0926    pregabalin capsule 75 mg, 75 mg, Oral, TID, Chuckie Gonsalezyn A, FNP, 75 mg at 09/10/24 0549    QUEtiapine tablet 400 mg, 400 mg, Oral, Nightly, Virgil Chaudhari NP, 400 mg at 09/09/24 2052    tamsulosin 24 hr capsule 0.4 mg, 0.4 mg, Oral, QHS, Carter Jha, FNP, 0.4 mg at 09/09/24 2052    vitamin renal formula (B-complex-vitamin c-folic acid) 1 mg per capsule 1 capsule, 1 capsule, Oral, Daily, Kelsey Gonsalez, FNP, 1 capsule at 09/10/24 0811     Review of Systems   Complete 12-point review of symptoms negative except for what's mentioned in HPI     Objective:     /70   Pulse 90   Temp 98.1 °F (36.7 °C) (Oral)   Resp 18   Ht 5' 8" (1.727 m)   Wt 104.3 kg (229 lb 15 oz)   SpO2 96%   BMI 34.96 kg/m²        Physical Exam  Vitals reviewed.   Eyes:      Pupils: Pupils are equal, round, and reactive to light.   Cardiovascular:      Rate and Rhythm: Normal rate and regular rhythm.      Heart sounds: Normal heart sounds.   Pulmonary:      Effort: Pulmonary effort is normal.      Breath sounds: Normal breath sounds.   Abdominal:      General: Bowel sounds are normal.   Musculoskeletal:         General: Normal range of motion.   Skin:     General: Skin is warm.      Comments: Lumbar surgical incision dry and intact    Neurological:      General: No focal deficit present.      Mental Status: He is alert and oriented to person, place, and time.      Motor: Weakness present.   Psychiatric:         Mood and Affect: Mood normal.     *MD performed and documented physical examination       Lines/Drains/Airways       None                   Labs  Recent Results (from the past 24 hour(s))   Basic Metabolic Panel    Collection Time: 09/10/24  4:57 AM   Result Value Ref Range    Sodium 130 (L) 136 - 145 mmol/L    " Potassium 3.5 3.5 - 5.1 mmol/L    Chloride 96 (L) 98 - 107 mmol/L    CO2 25 23 - 31 mmol/L    Glucose 138 (H) 82 - 115 mg/dL    Blood Urea Nitrogen 12.7 8.4 - 25.7 mg/dL    Creatinine 0.73 0.73 - 1.18 mg/dL    BUN/Creatinine Ratio 17     Calcium 8.7 (L) 8.8 - 10.0 mg/dL    Anion Gap 9.0 mEq/L    eGFR >60 mL/min/1.73/m2       Radiology   Lumbar XR on 09/07/2024, IMPRESSION:  Postsurgical changes with no adverse interval change appreciated.    Radiology   Cervical XR on 09/07/2024, IMPRESSION:  No adverse interval change appreciated.  The lobar cervical spine is not well visualized.  Radiology  MRI cervical spine 9/4: Impression: No acute traumatic findings identified. Degenerative listhesis at C4-5 and C6-7.   Radiology  X-ray lumbar spine 8/26: Impression: Postoperative changes of posterior fusion with intact hardware     Assessment/Plan:     59 y.o. WM admitted on 9/4/2024     Severe lumbar stenosis with radiculopathy and spondylosis   - s/p TLIF L2-S1 decompression and L3-S1 fusion on 08/19  - LSO brace when OOB, spinal precautions  - continue                 Dexamethasone 2 mg b.i.d. (continue taper)                 Lyrica 75 mg t.i.d.                 Baclofen 10 mg t.i.d.   - follow-up with Neurosurgery outpatient    Cough  - current  - initiate   Mucomyst nebs b.i.d. (initiated 9/10)   DuoNebs q.4 hours p.r.n.     Bipolar disorder  - manic overnight, improved this morning  - discontinued Venlafaxine 300 mg daily on 9/10  - discontinued Oxcarbazepine 150 mg b.i.d. on 9/10  - continue               Lamictal 25 mg daily (initiated 9/9)                             Seroquel 400 mg at night (increased 9/9)     Valium 5 mg b.i.d. p.r.n. anxiety (initiated 9/6)  - follow-up with psychiatrist outpatient     HLD  - [facility] outpatient  - continue                Atorvastatin 40 mg at night      HTN  - blood pressure at goal  - continue                Coreg 12.5 mg b.i.d.                  Hydrochlorothiazide 25 mg daily                   Lisinopril 40 mg daily                 Hydralazine 10 mg every 2 hours as needed for BP > 160/90                Labetalol 10 mg every 2 hours as needed for BP > 160/90  - low sodium diet     Constipation  - stable  - continue                 Colace 100 mg b.i.d.      BPH  - stable  - continue                 Tamsulosin 0.4 mg at night    Hyponatremia  - sodium 130-trending up  - Trileptal discontinued due to hyponatremia on 09/09  - encourage electrolyte drinks     VTE Prophylaxis:  Lovenox 40 mg daily     POA: no  Living will: no  Contacts: Clemencia Nix (daughter) [phone]                     Dee Dee (sister) [phone]     CODE STATUS: Full  Internal Medicine (attending): Thor King MD  Physiatry (consulting):  Devang Cheung MD     OUTPATIENT PROVIDERS  PCP: Rafy Poole MD   Neurosurgery:  Car August MD   Psychiatry: Jsoef Sutton MD in [place], [place]     DISPOSITION:  Sleep hygiene and bowel maintenance at goal.  Vital signs at goal with no recorded fevers.  BMP unremarkable.  Sodium trending up.  No new imaging today.  Significant cough/productive.  Appears to be underlying bronchitis.  Initiate Mucomyst every 8 hours and DuoNebs as needed.  Psychiatry adjusted psychiatric medications.  Continue to monitor closely.  Appreciate continued management by physiatry.  Monitor closely.  Notify of acute changes.    Staffing 9/9/2024: Continent of bowel and bladder. RT: Overall min assist.  Impulsive with poor safety. PT: Overall SBA.  Ambulating 50 feet partial mod to total assist. Poor carry over and safety awareness.  Needs constant verbal cueing. OT: Minimal progress. Complains of pain with medication.  Requires frequent redirection. Overall mod assist with ADLs. ST: Impaired cognition 2/2 bipolar. Projected discharge pending.     Jake Jha NP conducted independent physical examination and assisted with medical documentation.    Total time spent on this encounter including chart  review and direct MD + NP 1-on-1 patient interaction: 52 minutes   Over 50% of this time was spent in counseling and coordination of care
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
## 2024-09-11 PROCEDURE — 25000003 PHARM REV CODE 250: Performed by: NURSE PRACTITIONER

## 2024-09-11 PROCEDURE — 25000003 PHARM REV CODE 250: Performed by: PHYSICAL MEDICINE & REHABILITATION

## 2024-09-11 PROCEDURE — 25000003 PHARM REV CODE 250: Performed by: INTERNAL MEDICINE

## 2024-09-11 PROCEDURE — 94799 UNLISTED PULMONARY SVC/PX: CPT

## 2024-09-11 PROCEDURE — 97110 THERAPEUTIC EXERCISES: CPT

## 2024-09-11 PROCEDURE — 25000003 PHARM REV CODE 250

## 2024-09-11 PROCEDURE — 25000242 PHARM REV CODE 250 ALT 637 W/ HCPCS: Performed by: NURSE PRACTITIONER

## 2024-09-11 PROCEDURE — 99900031 HC PATIENT EDUCATION (STAT)

## 2024-09-11 PROCEDURE — 63600175 PHARM REV CODE 636 W HCPCS: Performed by: NURSE PRACTITIONER

## 2024-09-11 PROCEDURE — 94761 N-INVAS EAR/PLS OXIMETRY MLT: CPT

## 2024-09-11 PROCEDURE — 11800000 HC REHAB PRIVATE ROOM

## 2024-09-11 PROCEDURE — 94640 AIRWAY INHALATION TREATMENT: CPT

## 2024-09-11 RX ORDER — POLYETHYLENE GLYCOL 3350 17 G/17G
17 POWDER, FOR SOLUTION ORAL ONCE
Status: COMPLETED | OUTPATIENT
Start: 2024-09-11 | End: 2024-09-11

## 2024-09-11 RX ORDER — LITHIUM CARBONATE 300 MG/1
300 CAPSULE ORAL EVERY 12 HOURS
Status: DISCONTINUED | OUTPATIENT
Start: 2024-09-11 | End: 2024-09-13 | Stop reason: HOSPADM

## 2024-09-11 RX ADMIN — DOCUSATE SODIUM 100 MG: 100 CAPSULE, LIQUID FILLED ORAL at 08:09

## 2024-09-11 RX ADMIN — OXYCODONE HYDROCHLORIDE 10 MG: 5 TABLET ORAL at 11:09

## 2024-09-11 RX ADMIN — ACETAMINOPHEN 650 MG: 325 TABLET ORAL at 01:09

## 2024-09-11 RX ADMIN — HYDROCHLOROTHIAZIDE 25 MG: 25 TABLET ORAL at 08:09

## 2024-09-11 RX ADMIN — BACLOFEN 10 MG: 5 TABLET ORAL at 05:09

## 2024-09-11 RX ADMIN — ACETYLCYSTEINE 2 ML: 200 INHALANT RESPIRATORY (INHALATION) at 07:09

## 2024-09-11 RX ADMIN — CETIRIZINE HYDROCHLORIDE 10 MG: 10 TABLET, FILM COATED ORAL at 05:09

## 2024-09-11 RX ADMIN — OXYCODONE HYDROCHLORIDE 10 MG: 5 TABLET ORAL at 12:09

## 2024-09-11 RX ADMIN — BACLOFEN 10 MG: 5 TABLET ORAL at 10:09

## 2024-09-11 RX ADMIN — POLYETHYLENE GLYCOL 3350 17 G: 17 POWDER, FOR SOLUTION ORAL at 10:09

## 2024-09-11 RX ADMIN — CARVEDILOL 12.5 MG: 6.25 TABLET, FILM COATED ORAL at 08:09

## 2024-09-11 RX ADMIN — PREGABALIN 75 MG: 75 CAPSULE ORAL at 01:09

## 2024-09-11 RX ADMIN — QUETIAPINE 400 MG: 200 TABLET ORAL at 08:09

## 2024-09-11 RX ADMIN — LISINOPRIL 40 MG: 10 TABLET ORAL at 08:09

## 2024-09-11 RX ADMIN — ACETAMINOPHEN 650 MG: 325 TABLET ORAL at 10:09

## 2024-09-11 RX ADMIN — PREGABALIN 75 MG: 75 CAPSULE ORAL at 05:09

## 2024-09-11 RX ADMIN — LAMOTRIGINE 25 MG: 25 TABLET ORAL at 08:09

## 2024-09-11 RX ADMIN — LITHIUM CARBONATE 300 MG: 300 CAPSULE, GELATIN COATED ORAL at 08:09

## 2024-09-11 RX ADMIN — Medication 1 CAPSULE: at 08:09

## 2024-09-11 RX ADMIN — HYDROCODONE BITARTRATE AND ACETAMINOPHEN 1 TABLET: 5; 325 TABLET ORAL at 08:09

## 2024-09-11 RX ADMIN — ENOXAPARIN SODIUM 40 MG: 40 INJECTION SUBCUTANEOUS at 05:09

## 2024-09-11 RX ADMIN — TAMSULOSIN HYDROCHLORIDE 0.4 MG: 0.4 CAPSULE ORAL at 08:09

## 2024-09-11 RX ADMIN — PREGABALIN 75 MG: 75 CAPSULE ORAL at 10:09

## 2024-09-11 RX ADMIN — DIAZEPAM 5 MG: 5 TABLET ORAL at 05:09

## 2024-09-11 RX ADMIN — IPRATROPIUM BROMIDE AND ALBUTEROL SULFATE 3 ML: 2.5; .5 SOLUTION RESPIRATORY (INHALATION) at 07:09

## 2024-09-11 RX ADMIN — BACLOFEN 10 MG: 5 TABLET ORAL at 12:09

## 2024-09-11 RX ADMIN — ACETAMINOPHEN 650 MG: 325 TABLET ORAL at 05:09

## 2024-09-11 RX ADMIN — ATORVASTATIN CALCIUM 40 MG: 40 TABLET, FILM COATED ORAL at 08:09

## 2024-09-11 RX ADMIN — DEXAMETHASONE 2 MG: 2 TABLET ORAL at 08:09

## 2024-09-11 RX ADMIN — FLUTICASONE PROPIONATE 100 MCG: 50 SPRAY, METERED NASAL at 08:09

## 2024-09-11 RX ADMIN — DIAZEPAM 5 MG: 5 TABLET ORAL at 01:09

## 2024-09-11 RX ADMIN — ACETYLCYSTEINE 2 ML: 200 INHALANT RESPIRATORY (INHALATION) at 01:09

## 2024-09-11 RX ADMIN — IPRATROPIUM BROMIDE AND ALBUTEROL SULFATE 3 ML: 2.5; .5 SOLUTION RESPIRATORY (INHALATION) at 01:09

## 2024-09-11 RX ADMIN — OXYCODONE HYDROCHLORIDE 10 MG: 5 TABLET ORAL at 05:09

## 2024-09-11 NOTE — PROGRESS NOTES
[facility] ([facility])  Rehab Progress Note    Patient Name: Lionel Nix  MRN: [id number]  Age: 59 y.o. Sex: male  : 1965  Hospital Length of Stay: 7 days  Date of Service: 2024   Chief Complaint: Severe lumbar stenosis with radiculopathy and spondylosis s/p TLIF L2-S1 decompression and L3-S1 fusion on      Subjective:     Basic Information  Admit Information: 59-year-old male presented to [facility] on  for scheduled lumbar decompression and fusion. PMH significant for hypertension, hyperlipidemia, anxiety, depression.  On  tolerated TLIF L2-S1 decompression and L3-S1 fusion without perioperative complications.  On  discontinued TRAVIS drain.  Postop CT scan with good placement of hardware.  Psychiatry evaluated on .  Continued Effexor, Seroquel, and initiated Trileptal.  Continued with postoperative pain.  Lyrica initiated along with increase in hypertensive medications.  Reported fall on .  MRI cervical spine with no acute traumatic findings identified and degenerative listhesis at C4-5 and C6-7.  No neurosurgical interventions planned for C-spine. Tolerated transfer to [facility] inpatient rehab unit on  without incident.   Today's Information: No acute events overnight.  Lying in bed.  Reports diet.  Aggravated this morning.  Wanting to get up without assistance.  Last .  Limited by pain.  Complains of neuropathy.  Vital signs at goal with no recorded fevers.  No new labs or imaging today.    Review of patient's allergies indicates:   Allergen Reactions    Gabapentin Anxiety and Palpitations        Current Facility-Administered Medications:     acetaminophen tablet 650 mg, 650 mg, Oral, TID, Florence Gonsalezhryn A, FNP, 650 mg at 24 0517    acetaminophen tablet 650 mg, 650 mg, Oral, Q8H PRN, Florence Gonsalezhryn A, FNP    acetylcysteine 200 mg/ml (20%) solution 2 mL, 2 mL, Nebulization, TID, Carter Jha A, FNP, 2 mL at 24 0706     albuterol-ipratropium 2.5 mg-0.5 mg/3 mL nebulizer solution 3 mL, 3 mL, Nebulization, Q4H PRN, Carter Jha A, FNP, 3 mL at 09/11/24 0705    atorvastatin tablet 40 mg, 40 mg, Oral, QHS, Yudelka, Carter A, FNP, 40 mg at 09/10/24 2035    baclofen tablet 10 mg, 10 mg, Oral, QID, Devang Cheung Sr., MD, 10 mg at 09/11/24 0511    benzonatate capsule 100 mg, 100 mg, Oral, TID PRN, Yudelka Carter A, FNP, 100 mg at 09/10/24 1401    carvediloL tablet 12.5 mg, 12.5 mg, Oral, BID, Alivia Garcia, FNP, 12.5 mg at 09/11/24 0842    cetirizine tablet 10 mg, 10 mg, Oral, After dinner, Kelsey Gonsalez, FNP, 10 mg at 09/10/24 1731    dexAMETHasone tablet 2 mg, 2 mg, Oral, Q12H, Alivia Garcia, FNP, 2 mg at 09/11/24 0842    diazePAM tablet 5 mg, 5 mg, Oral, Q8H PRN, Kelsey Gonsalez, FNP, 5 mg at 09/11/24 0514    docusate sodium capsule 100 mg, 100 mg, Oral, BID, Yudelka Carter A, FNP, 100 mg at 09/11/24 0841    enoxaparin injection 40 mg, 40 mg, Subcutaneous, Q24H (prophylaxis, 1700), Yudelka, Carter A, FNP, 40 mg at 09/10/24 1731    fluticasone propionate 50 mcg/actuation nasal spray 100 mcg, 2 spray, Each Nostril, Daily, Kelsey Gonsalez, FNP, 100 mcg at 09/11/24 0845    hydrALAZINE injection 10 mg, 10 mg, Intravenous, Q4H PRN, Yudelka Carter A, FNP    hydroCHLOROthiazide tablet 25 mg, 25 mg, Oral, Daily, Alivia Garcia, FNP, 25 mg at 09/11/24 0842    HYDROcodone-acetaminophen 5-325 mg per tablet 1 tablet, 1 tablet, Oral, Daily PRN, Carter Jha FNP, 1 tablet at 09/11/24 0842    labetalol 20 mg/4 mL (5 mg/mL) IV syring, 10 mg, Intravenous, Q4H PRN, Carter Jha FNP, 10 mg at 09/06/24 2149    lamoTRIgine tablet 25 mg, 25 mg, Oral, Daily, Virgil Chaudhari NP, 25 mg at 09/11/24 0841    lisinopriL tablet 40 mg, 40 mg, Oral, Daily, Alivia Garcia, MIGEL, 40 mg at 09/11/24 0841    metoprolol injection 10 mg, 10 mg, Intravenous, Q2H PRN, Carter Jha FNP    nitroGLYCERIN  "SL tablet 0.4 mg, 0.4 mg, Sublingual, Q5 Min PRN, Yudelka, Carter A, FNP    ondansetron disintegrating tablet 8 mg, 8 mg, Oral, Q8H PRN, Yudelka Carter A, FNP    ondansetron injection 4 mg, 4 mg, Intravenous, Q8H PRN, Yudelka, Carter A, FNP    oxyCODONE immediate release tablet 10 mg, 10 mg, Oral, Q4H PRN, Kelsey Gonsalez, FNP, 10 mg at 09/11/24 0511    oxyCODONE immediate release tablet 5 mg, 5 mg, Oral, Q4H PRN, Chuckie Gonsalezyn A, FNP    polyethylene glycol packet 17 g, 17 g, Oral, BID PRN, Jake Jhaothy A, FNP, 17 g at 09/07/24 0926    polyethylene glycol packet 17 g, 17 g, Oral, Once, Thor King MD    pregabalin capsule 75 mg, 75 mg, Oral, TID, Kelsey Gonsalez, FNP, 75 mg at 09/11/24 0512    QUEtiapine tablet 400 mg, 400 mg, Oral, Nightly, Virgil Chaudhari NP, 400 mg at 09/10/24 2100    tamsulosin 24 hr capsule 0.4 mg, 0.4 mg, Oral, QHS, Jake Jhaothy A, FNP, 0.4 mg at 09/10/24 2034    vitamin renal formula (B-complex-vitamin c-folic acid) 1 mg per capsule 1 capsule, 1 capsule, Oral, Daily, Kelsey Gonsalez, FNP, 1 capsule at 09/11/24 0841     Review of Systems   Complete 12-point review of symptoms negative except for what's mentioned in HPI     Objective:     /82   Pulse 100   Temp 97.8 °F (36.6 °C) (Oral)   Resp 18   Ht 5' 8" (1.727 m)   Wt 104.3 kg (229 lb 15 oz)   SpO2 98%   BMI 34.96 kg/m²        Physical Exam  Vitals reviewed.   Eyes:      Pupils: Pupils are equal, round, and reactive to light.   Cardiovascular:      Rate and Rhythm: Normal rate and regular rhythm.      Heart sounds: Normal heart sounds.   Pulmonary:      Effort: Pulmonary effort is normal.      Breath sounds: Normal breath sounds.   Abdominal:      General: Bowel sounds are normal.   Musculoskeletal:         General: Normal range of motion.   Skin:     General: Skin is warm.      Comments: Lumbar surgical incision dry and intact    Neurological:      General: No focal deficit present.      " Mental Status: He is alert and oriented to person, place, and time.      Motor: Weakness present.   Psychiatric:         Mood and Affect: Mood normal.     *MD performed and documented physical examination       Lines/Drains/Airways       None                   Labs  No results found for this or any previous visit (from the past 24 hour(s)).      Radiology   Lumbar XR on 09/07/2024, IMPRESSION:  Postsurgical changes with no adverse interval change appreciated.    Radiology   Cervical XR on 09/07/2024, IMPRESSION:  No adverse interval change appreciated.  The lobar cervical spine is not well visualized.  Radiology  MRI cervical spine 9/4: Impression: No acute traumatic findings identified. Degenerative listhesis at C4-5 and C6-7.   Radiology  X-ray lumbar spine 8/26: Impression: Postoperative changes of posterior fusion with intact hardware     Assessment/Plan:     59 y.o. WM admitted on 9/4/2024     Severe lumbar stenosis with radiculopathy and spondylosis   - s/p TLIF L2-S1 decompression and L3-S1 fusion on 08/19  - LSO brace when OOB, spinal precautions  - continue                 Dexamethasone 2 mg b.i.d. (continue taper)                 Lyrica 75 mg t.i.d.                 Baclofen 10 mg t.i.d.   - follow-up with Neurosurgery outpatient    Cough  - current  - initiate   Mucomyst nebs b.i.d. (initiated 9/10)   DuoNebs q.4 hours p.r.n.     Bipolar disorder  - manic overnight, improved this morning  - discontinued Venlafaxine 300 mg daily on 9/10  - discontinued Oxcarbazepine 150 mg b.i.d. on 9/10  - continue               Lamictal 25 mg daily (initiated 9/9)                             Seroquel 400 mg at night (increased 9/9)     Valium 5 mg b.i.d. p.r.n. anxiety (initiated 9/6)  - follow-up with psychiatrist outpatient     HLD  - FLP outpatient  - continue                Atorvastatin 40 mg at night      HTN  - blood pressure at goal  - continue                Coreg 12.5 mg b.i.d.                   Hydrochlorothiazide 25 mg daily                  Lisinopril 40 mg daily                 Hydralazine 10 mg every 2 hours as needed for BP > 160/90                Labetalol 10 mg every 2 hours as needed for BP > 160/90  - low sodium diet     Constipation  - stable  - continue                 Colace 100 mg b.i.d.      BPH  - stable  - continue                 Tamsulosin 0.4 mg at night    Hyponatremia  - sodium 130-trending up  - Trileptal discontinued due to hyponatremia on 09/09  - encourage electrolyte drinks     VTE Prophylaxis:  Lovenox 40 mg daily     POA: no  Living will: no  Contacts: Clemencia Nix (daughter) [phone]                     Dee Dee (sister) [phone]     CODE STATUS: Full  Internal Medicine (attending): Thor King MD  Physiatry (consulting):  Devang Cheung MD     OUTPATIENT PROVIDERS  PCP: Rafy Poole MD   Neurosurgery:  Car August MD   Psychiatry: Josef Sutton MD in [place], [place]     DISPOSITION:  Sleep hygiene and bowel maintenance at goal.  Vital signs at goal with no recorded fevers.  No new labs or imaging today.  Limited by pain.  Defer pain management to physiatry.  Worsening mood today.  Psychiatry following.  Continue current POC.  Monitor closely.  Notify of acute changes.    Staffing 9/9/2024: Continent of bowel and bladder. RT: Overall min assist.  Impulsive with poor safety. PT: Overall SBA.  Ambulating 50 feet partial mod to total assist. Poor carry over and safety awareness.  Needs constant verbal cueing. OT: Minimal progress. Complains of pain with medication.  Requires frequent redirection. Overall mod assist with ADLs. ST: Impaired cognition 2/2 bipolar. Projected discharge pending.     Jake Jha NP conducted independent physical examination and assisted with medical documentation.

## 2024-09-11 NOTE — PLAN OF CARE
Problem: Fall Injury Risk  Goal: Absence of Fall and Fall-Related Injury  Outcome: Progressing  Intervention: Promote Injury-Free Environment  Flowsheets (Taken 9/10/2024 2109)  Safety Promotion/Fall Prevention:   assistive device/personal item within reach   bed alarm set   Fall Risk signage in place   instructed to call staff for mobility   lighting adjusted   nonskid shoes/socks when out of bed   side rails raised x 3   supervised activity

## 2024-09-11 NOTE — PROGRESS NOTES
09/11/24 1030   Rec Therapy Time Calculation   Date of Treatment 09/11/24   Rec Start Time 1030   Rec Stop Time 1100   Rec Total Time (min) 30 min   Time   Treatment time 2 units   Charges   $Therapeutic Exercise 2 units   Precautions   General Precautions fall   Orthopedic Precautions  spinal precautions   Braces LSO   Pain/Comfort   Pain Rating 1 no pain   OTHER   Rehab identified problem list/impairments weakness;impaired functional mobility;gait instability;impaired cognition;decreased coordination;decreased lower extremity function;decreased safety awareness   Values/Beliefs/Spiritual Care   Spiritual, Cultural Beliefs, Quaker Practices, Values that Affect Care no   Overall Level of Functioning   Activity Tolerance Independent   Dynamic Sitting Balance/Reaching Mod Indep   Dynamic Standing Balance/Reaching Min A   Right UE Coodination/Dexterity Mod Indep   Left UE Coordination/Dexterity Mod Indep   Problem Solving/Sequencing Skills Standby Assist   Memory Recall Standby Assist   R/L Neglect/Inattention Does not occur   Attention Span Min A   Social Interaction Mod Indep   Recreational Therapy Short Term Goals   Short Term Goal 1 Progression Progressing   Short Term Goal 2 Progression Progressing   Recreational Therapy Long Term Goals   Long Term Goal 1 Progression Met   Long Term Goal 2 Progression Progressing   Plan   Patient to be seen Daily   Planned Duration 1 week   Treatments Planned Balance training;Coordination;Energy conservation training;Safety education   Treatment plan/goals estblished with Patient/Caregiver Yes
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
## 2024-09-11 NOTE — PT/OT/SLP RE-EVAL
Recreational Therapy Re-Evaluation      Date of Treatment: 09/11/24  Start Time: 1030  Stop Time: 1100  Total Time: 30 min  Missed Time:      Assessment      Lionel Nix is a 59 y.o. male admitted with a medical diagnosis of S/P lumbar spinal fusion.  He presents with the following impairments/functional limitations:  weakness, impaired functional mobility, gait instability, impaired cognition, decreased coordination, decreased lower extremity function, decreased safety awareness .    Rehab Diagnosis:     Recent Surgery:    General Precautions: Standard, fall     Orthopedic Precautions:spinal precautions     Braces: LSO    Rehab Prognosis: Fair; patient would benefit from acute skilled Recreational Therapy services to address these deficits and reach maximum level of function.      Impairments: Balance deficits, Cognitive deficits, Coordination deficits, Endurance deficits, Mobility deficits, Safety awareness deficits, and Strength deficits  Rehab Potential: Fair, due to: Reduced endurance during therapy   Treatment Recommendations: Continue with current plan of care   Treatment Diagnosis: Severe lumbar stenosis with radiculopathy and spondylosis, TLIF L2-S1 decompression and fusion, HTN, HLD, lumbosacral raadiculopathy and degenerative joint disease of the lumbar spine  Orientation: Oriented x4  Affect/Behavior: Agitated and Distracted  Safety/Judgement: impaired   Basic Command Following: intact  Spiritual Cultural: no        History     Past Medical History:   Diagnosis Date    Anxiety     Chronic right-sided low back pain     Enlarged prostate     Hyperlipidemia     Hypertension     Lumbosacral radiculopathy due to degenerative joint disease of spine     Neuritis or radiculitis due to rupture of lumbar intervertebral disc     Numbness and tingling of both feet     Numbness and tingling of both legs     Obesity     Other spondylosis with radiculopathy, lumbar region        Past Surgical History:   Procedure  "Laterality Date    BACK SURGERY      COLONOSCOPY      HERNIA REPAIR      SHOULDER SURGERY Left     TRANSFORAMINAL LUMBAR INTERBODY FUSION (TLIF) USING COMPUTER-ASSISTED NAVIGATION Left 8/19/2024    Procedure: FUSION, SPINE, LUMBAR, TLIF, USING COMPUTER-ASSISTED NAVIGATION;  Surgeon: Car August MD;  Location: [facility];  Service: Neurosurgery;  Laterality: Left;  L2 - S1 TRANSFORAMINAL INTERBODY FUSION / POSTERIOR LATERAL FUSION // PRONE PEDRO // DRILL // MICROSCOPE // O-ARM // DIRECT SPINE // NDM       Home Environment     Admit Date: 09/05/24  Living Situation  People in Home:  (Pt. reports he used to live with eldest daughter and wife but will be staying with sister upon d/c)  Lives in: house  Patients Responsibilities: Caregiver to pet, Community mobility, , Financial management, Leisure/play/hobbies, Employed  Number of Children: 3  Occupation:[employer]    Instrumental Activities of Daily Living     Previous Hand Dominance: Right Current Hand Dominance: Right     Other iADL Information:        Cognitive Skills Building         Cognitive Observation Activity Assist Position Equipment Response            Comment:      Dynamic Activities      Activity Assist Position Equipment Response   Activity 1 Bocce ball contact guard assistance Standing Rolling walker and Bocce balls fair   Comment: Sit to stand was contact guard as was dynamic standing balance/reaching.  Standing tolerance was 7 minutes.  UE coordination was setup.  Direction following and sequencing skills were setup.  He perseverated that his schedule was wrong and that he had to sit in w/c since 5:00 am.  Staff apologized to him about the schedule change and for having to sit in w/c .  He said he wasn't upset with this staff but needed to vent        Fine Motor Activities      Activity Assist Position Equipment Response           Comment:        Goals     Patient Goals  Patient Goal 1: "To be able to walk and go on with life " "again."    Short Term Goals    Goal  Goal Status   Will increase sit to stand to supervision Progressing   Will improve dynamic standing balance/reaching to supervision Progressing                 Long Term Goals    Goal Goal Status   Will increase standing tolerance to 5 minutes Met   Will improve dynamic standing balance/reaching to setup Progressing                     Plan       Patient to be seen: Daily  Duration: 1 week  Treatments planned: Balance training, Coordination, Energy conservation training, Safety education  Treatment plan/goals established with Patient/Caregiver: Yes     "

## 2024-09-11 NOTE — PROGRESS NOTES
"9/11/2024  Lionel Nix   1965   [id number]        Psychiatry Progress Note       SUBJECTIVE:   Lionel Nix is a 59 y.o. male with a past medical history that includes HTN, HLD, severe lumbar stenosis with lumbosacral radiculopathy and degenerative joint disease of the lumbar spine who was admitted to [facility] on 08/19/24 and underwent a TLIF L2-SA decompression and fusion. Presented to inpatient rehab to improve functional status at [facility] on 09/04/24. Was seen by psychiatry during initial hospitalization on 08/24/24 due to depression. Psychiatry consulted due to symptoms of herrera.     Displays increased evidence of herrera today compared to last two days. Irritable mood/affect with increased psychomotor agitation as well as being talkative and difficult to interrupt. Reports being upset due to staff taking "twenty minutes " to help him off the toilet and states "I hit my walker against the wall" in frustration. Displays some grandiose thinking and states he talked to Erasto Sullivan about the service provided by this hospital. Reports that he slept well but that he had urinated on himself overnight due to lack of help. No longer reports feeling depressed and also denies feeling anxious. Denies suicidal ideations, homicidal ideations, or hallucinations. Was previously started on lamictal due to appearance of a mixed mood episode, but as mood appears manic without mixed features I will change this to lithium.     Current Medications:   Scheduled Meds:    acetaminophen  650 mg Oral TID    acetylcysteine 200 mg/ml (20%)  2 mL Nebulization TID    atorvastatin  40 mg Oral QHS    baclofen  10 mg Oral QID    carvediloL  12.5 mg Oral BID    cetirizine  10 mg Oral After dinner    dexAMETHasone  2 mg Oral Q12H    docusate sodium  100 mg Oral BID    enoxparin  40 mg Subcutaneous Q24H (prophylaxis, 1700)    fluticasone propionate  2 spray Each Nostril Daily    hydroCHLOROthiazide  25 mg Oral Daily    lamoTRIgine  25 mg Oral " Daily    lisinopriL  40 mg Oral Daily    pregabalin  75 mg Oral TID    QUEtiapine  400 mg Oral Nightly    tamsulosin  0.4 mg Oral QHS    vitamin renal formula (B-complex-vitamin c-folic acid)  1 capsule Oral Daily      PRN Meds:   Current Facility-Administered Medications:     acetaminophen, 650 mg, Oral, Q8H PRN    albuterol-ipratropium, 3 mL, Nebulization, Q4H PRN    benzonatate, 100 mg, Oral, TID PRN    diazePAM, 5 mg, Oral, Q8H PRN    hydrALAZINE, 10 mg, Intravenous, Q4H PRN    HYDROcodone-acetaminophen, 1 tablet, Oral, Daily PRN    labetalol, 10 mg, Intravenous, Q4H PRN    metoprolol, 10 mg, Intravenous, Q2H PRN    nitroGLYCERIN, 0.4 mg, Sublingual, Q5 Min PRN    ondansetron, 8 mg, Oral, Q8H PRN    ondansetron, 4 mg, Intravenous, Q8H PRN    oxyCODONE, 10 mg, Oral, Q4H PRN    oxyCODONE, 5 mg, Oral, Q4H PRN    polyethylene glycol, 17 g, Oral, BID PRN   Psychotherapeutics (From admission, onward)      Start     Stop Route Frequency Ordered    09/09/24 2100  QUEtiapine tablet 400 mg         -- Oral Nightly 09/09/24 1525    09/09/24 1629  diazePAM tablet 5 mg         -- Oral Every 8 hours PRN 09/09/24 1529            Allergies:   Review of patient's allergies indicates:   Allergen Reactions    Gabapentin Anxiety and Palpitations        OBJECTIVE:   Vitals   Vitals:    09/11/24 1322   BP:    Pulse: 108   Resp: 16   Temp:         Labs/Imaging/Studies:   Recent Results (from the past 36 hour(s))   Basic Metabolic Panel    Collection Time: 09/10/24  4:57 AM   Result Value Ref Range    Sodium 130 (L) 136 - 145 mmol/L    Potassium 3.5 3.5 - 5.1 mmol/L    Chloride 96 (L) 98 - 107 mmol/L    CO2 25 23 - 31 mmol/L    Glucose 138 (H) 82 - 115 mg/dL    Blood Urea Nitrogen 12.7 8.4 - 25.7 mg/dL    Creatinine 0.73 0.73 - 1.18 mg/dL    BUN/Creatinine Ratio 17     Calcium 8.7 (L) 8.8 - 10.0 mg/dL    Anion Gap 9.0 mEq/L    eGFR >60 mL/min/1.73/m2        Psychiatric Mental Status Exam:  General Appearance: appears stated age,  appropriately dressed, in no acute distress, sitting in chair  Arousal: alert  Behavior: cooperative, restless, appropriate eye-contact  Movements and Motor Activity: +psychomotor agitation  Orientation: oriented to person, place, and time  Speech: coherent, talkative  Mood: Irritable  Affect: reactive, full-range, mood-congruent, irritable  Thought Process: circumstantial  Associations: no loosening of associations  Thought Content and Perceptions: no suicidal or homicidal ideation, no auditory or visual hallucinations, no paranoid ideation, no ideas of reference, no evidence of delusions or psychosis  Recent and Remote Memory: grossly intact; per interview/observation with patient  Attention and Concentration: grossly intact; per interview/observation with patient  Fund of Knowledge: grossly intact; based on history, vocabulary, fund of knowledge, syntax, grammar, and content  Insight: Questionable; based on understanding of severity of illness and HPI  Judgment: Questionable; based on patient's behavior and HPI    ASSESSMENT/PLAN:   Problems Addressed/Diagnoses:  Bipolar disorder, current episode manic, mild    Past Medical History:   Diagnosis Date    Anxiety     Chronic right-sided low back pain     Enlarged prostate     Hyperlipidemia     Hypertension     Lumbosacral radiculopathy due to degenerative joint disease of spine     Neuritis or radiculitis due to rupture of lumbar intervertebral disc     Numbness and tingling of both feet     Numbness and tingling of both legs     Obesity     Other spondylosis with radiculopathy, lumbar region         Plan:  Medication Management  Quetiapine 400mg PO QHS  Discontinue lamictal  Lithium 300mg PO Q12hr  Psychiatry will continue to follow        Virgil Chaudhari    no

## 2024-09-12 PROCEDURE — 25000003 PHARM REV CODE 250: Performed by: PHYSICAL MEDICINE & REHABILITATION

## 2024-09-12 PROCEDURE — 99900031 HC PATIENT EDUCATION (STAT)

## 2024-09-12 PROCEDURE — 25000242 PHARM REV CODE 250 ALT 637 W/ HCPCS: Performed by: NURSE PRACTITIONER

## 2024-09-12 PROCEDURE — 25000003 PHARM REV CODE 250

## 2024-09-12 PROCEDURE — 97168 OT RE-EVAL EST PLAN CARE: CPT

## 2024-09-12 PROCEDURE — 63600175 PHARM REV CODE 636 W HCPCS: Performed by: NURSE PRACTITIONER

## 2024-09-12 PROCEDURE — 11800000 HC REHAB PRIVATE ROOM

## 2024-09-12 PROCEDURE — 99233 SBSQ HOSP IP/OBS HIGH 50: CPT | Mod: ,,, | Performed by: NURSE PRACTITIONER

## 2024-09-12 PROCEDURE — 25000003 PHARM REV CODE 250: Performed by: NURSE PRACTITIONER

## 2024-09-12 PROCEDURE — 97530 THERAPEUTIC ACTIVITIES: CPT | Mod: CQ

## 2024-09-12 PROCEDURE — 97116 GAIT TRAINING THERAPY: CPT | Mod: CQ

## 2024-09-12 PROCEDURE — 97535 SELF CARE MNGMENT TRAINING: CPT

## 2024-09-12 PROCEDURE — 99900035 HC TECH TIME PER 15 MIN (STAT)

## 2024-09-12 PROCEDURE — 94761 N-INVAS EAR/PLS OXIMETRY MLT: CPT

## 2024-09-12 PROCEDURE — 94640 AIRWAY INHALATION TREATMENT: CPT

## 2024-09-12 PROCEDURE — 94799 UNLISTED PULMONARY SVC/PX: CPT

## 2024-09-12 PROCEDURE — 97110 THERAPEUTIC EXERCISES: CPT | Mod: CQ

## 2024-09-12 RX ADMIN — BACLOFEN 10 MG: 5 TABLET ORAL at 06:09

## 2024-09-12 RX ADMIN — LITHIUM CARBONATE 300 MG: 300 CAPSULE, GELATIN COATED ORAL at 09:09

## 2024-09-12 RX ADMIN — DOCUSATE SODIUM 100 MG: 100 CAPSULE, LIQUID FILLED ORAL at 09:09

## 2024-09-12 RX ADMIN — OXYCODONE HYDROCHLORIDE 10 MG: 5 TABLET ORAL at 11:09

## 2024-09-12 RX ADMIN — PREGABALIN 75 MG: 75 CAPSULE ORAL at 01:09

## 2024-09-12 RX ADMIN — PREGABALIN 75 MG: 75 CAPSULE ORAL at 09:09

## 2024-09-12 RX ADMIN — CARVEDILOL 12.5 MG: 6.25 TABLET, FILM COATED ORAL at 09:09

## 2024-09-12 RX ADMIN — TAMSULOSIN HYDROCHLORIDE 0.4 MG: 0.4 CAPSULE ORAL at 09:09

## 2024-09-12 RX ADMIN — IPRATROPIUM BROMIDE AND ALBUTEROL SULFATE 3 ML: 2.5; .5 SOLUTION RESPIRATORY (INHALATION) at 07:09

## 2024-09-12 RX ADMIN — ACETYLCYSTEINE 2 ML: 200 INHALANT RESPIRATORY (INHALATION) at 07:09

## 2024-09-12 RX ADMIN — DIAZEPAM 5 MG: 5 TABLET ORAL at 01:09

## 2024-09-12 RX ADMIN — BACLOFEN 10 MG: 5 TABLET ORAL at 11:09

## 2024-09-12 RX ADMIN — OXYCODONE HYDROCHLORIDE 10 MG: 5 TABLET ORAL at 01:09

## 2024-09-12 RX ADMIN — BACLOFEN 10 MG: 5 TABLET ORAL at 05:09

## 2024-09-12 RX ADMIN — ACETAMINOPHEN 650 MG: 325 TABLET ORAL at 09:09

## 2024-09-12 RX ADMIN — LISINOPRIL 40 MG: 10 TABLET ORAL at 09:09

## 2024-09-12 RX ADMIN — IPRATROPIUM BROMIDE AND ALBUTEROL SULFATE 3 ML: 2.5; .5 SOLUTION RESPIRATORY (INHALATION) at 02:09

## 2024-09-12 RX ADMIN — DEXAMETHASONE 2 MG: 2 TABLET ORAL at 09:09

## 2024-09-12 RX ADMIN — ATORVASTATIN CALCIUM 40 MG: 40 TABLET, FILM COATED ORAL at 09:09

## 2024-09-12 RX ADMIN — ACETAMINOPHEN 650 MG: 325 TABLET ORAL at 01:09

## 2024-09-12 RX ADMIN — OXYCODONE HYDROCHLORIDE 10 MG: 5 TABLET ORAL at 09:09

## 2024-09-12 RX ADMIN — Medication 1 CAPSULE: at 09:09

## 2024-09-12 RX ADMIN — ENOXAPARIN SODIUM 40 MG: 40 INJECTION SUBCUTANEOUS at 06:09

## 2024-09-12 RX ADMIN — CETIRIZINE HYDROCHLORIDE 10 MG: 10 TABLET, FILM COATED ORAL at 06:09

## 2024-09-12 RX ADMIN — QUETIAPINE 400 MG: 200 TABLET ORAL at 09:09

## 2024-09-12 RX ADMIN — HYDROCHLOROTHIAZIDE 25 MG: 25 TABLET ORAL at 09:09

## 2024-09-12 RX ADMIN — BACLOFEN 10 MG: 5 TABLET ORAL at 01:09

## 2024-09-12 RX ADMIN — OXYCODONE HYDROCHLORIDE 10 MG: 5 TABLET ORAL at 06:09

## 2024-09-12 RX ADMIN — PREGABALIN 75 MG: 75 CAPSULE ORAL at 05:09

## 2024-09-12 RX ADMIN — ACETAMINOPHEN 650 MG: 325 TABLET ORAL at 05:09

## 2024-09-12 RX ADMIN — ACETYLCYSTEINE 2 ML: 200 INHALANT RESPIRATORY (INHALATION) at 02:09

## 2024-09-12 NOTE — PLAN OF CARE
Problem: Occupational Therapy  Goal: Occupational Therapy Goal  Description: STG's to review at Re-eval:    MET - Patient will demonstrate improved independence with upper body dressing while efficiently donning shirt and LSO brace with SPV.   Progressing - Patient will demonstrate improved energy conservation and safety awareness while utilizing safety strategies taught during ADLs and transfers with Min cueing.   Progressing - Patient will perform lower body dressing with use of AE prn and SPV.   Progressing - Patient will perform toilet hygiene with CGA while maintaining spinal precautions and use of RW/grab bars as needed.  Progressing - Patient will perform tub transfer with SBA and use of RW.   MET - Patient will perform showering with SPV for safety while maintaining precautions and using TTB and grab bars as needed.   Updated goal: Patient will perform showering with set up while maintaining precautions and using TTB and grab bars as needed.  Outcome: Progressing      18.89

## 2024-09-12 NOTE — NURSING
Nurses Note -- 4 Eyes      9/11/2024   07:05 PM      Skin assessed during: Q Shift Change      [] No Altered Skin Integrity Present    []Prevention Measures Documented      [x] Yes- Altered Skin Integrity Present or Discovered   [] LDA Added if Not in Epic (Describe Wound)   [] New Altered Skin Integrity was Present on Admit and Documented in LDA   [] Wound Image Taken    Wound Care Consulted? No    Attending Nurse:  Carter Santoro RN/Staff Member:  MELODIE Alvarez

## 2024-09-12 NOTE — PROGRESS NOTES
Dos 9/12/24  Still agitated but less so  Hopefully addition of lithium helps  Patient seen and evaluated in room today.  Therapy and needs discussed with patient  Reviewed present barriers to progress- he remains upset  Reviewed chart  Discussed with Dr King's [facility] medicine team, nursing staff as well as my NP, Marilyn Gonsalez  Agree with present POC  Subjective  HPI:  60 yo WM with a PMH of HTN, HLD, severe lumbar stenosis with lumbosacral radiculopathy and degenerative joint disease of lumbar spine was admitted to [facility] on 8/19/24, and underwent a TLIF L2-S1 decompression and fusion by Dr. August. Drain was placed to site. Patient placed on spinal precautions with LSO brace. On 8/20, PT/OT evals completed with impairments noted of weakness, impaired balance, orthopedic precautions, pain, impaired self care skills, impaired endurance, and impairedfunctional mobility. Labs showed low RBC of 3.09, low H&H of 9.8 & 27.6, low potassium of 3.0, elevated glucose of 118, low calcium of 7.8. On 8/21, patient complained of right posterior leg pain which worsened with sitting. TRAVIS drain remained in place. Decadron was increased. On 8/22, patient complained of continued leg pain and numbness so CT of lumbar spine was completed which showed postoperative changes without evidence of acute abnormality. Patient had continued buckling of BLE with therapy noted with high risk for falls. On 8/23, TRAVIS drain had 20ml output so drain was removed. Patient reported depression due to current situation so pyanne was consulted. On 8/24, psych was consulted for depression and anxiety with recommendation to continue Effexor XR 300mg daily, continue seroquel 300mg at bedtime, added Trileptal 150mg BID, added Ambien PRN, and added Hydroxyzine 25mg TID PRN intense anxiety episodes. Patient will also need outpatient followup with therapist. On 8/25, patient fell after using restroom, Dr August was notified of fall, and Xrays ordered. On 8/26, Lumbar  spine XR showed Postoperative changes of posterior fusion with intact hardware. Sacrum XR showed No fractures or dislocations are clearly identified, Degenerative changes. Patient complained of back pain rating at 10 on 1-10 scale. Left knee XR showed no acute abnormalities seen. On 8/27, Cervical XR showed Limited exam with some degenerative changes and anterolisthesis as described above but essentially nondiagnostic other imaging modalities and or repeat exam is suggested. On 8/28, Patient BP uncontrolled so hospitialist was consulted for hypertension management. Patient was started on Metoprolol 50mg PO BID. On 8/29, BP remained elevated so increased Lisinopril to 40mg, changed Metoprolol to Carvedilol, and continued HCTZ. Lyrica was also added due to uncontrolled pain, and side effects of previously trialed gabapentin. On 8/30, WBC elevated so ordered Chest XR which was negative for acute findings. D-Dimer was elevated at 0.55, low Na noted at 134. On 9/1, labs showed elevated WBC of 12.40, low H&H of 9.9 & 30.1, low NA of 135, elevated glucose of 126, low calcium of 8.2. On 9/2, MRI of cervical spine was completed with No acute traumatic findings identified; Degenerative listhesis at C4-5 and C6-7. On 9/4, neurosurgeon reviewed MRI with no surgical intervention planned, OK to continue to mobilize, and OK transfer to rehab. Patient is AAOx4.  Participating with therapy. Functional status includes minimal assist needed for transfers with RW, walked 50ft with RW at minimal to moderate assist with episode of knee buckling noted, setup assist for grooming while seated, modified independent for toilet hygiene but required moderate assist for clothing management, minimal assist for lower body dressing and modified independent for socks with reacher, and setup for eating.Patient was evaluated, accepted, and admitted to inpatient rehab to improve functional status. Transferred to [facility] on 9/4 without incident.     9/12:  "Seen in patient room, seated in WC following a fall. States that the lady assisting him to restroom did nothing wrong, but his legs gave out again. Concerned that something is wrong and appreciative that we were going to get a CT scan. Neurosurgery reviewed and recommended continuation with therapy as no acute changes seen. Patient Amb w/RW with bilateral knees wrapped in Aces and Assist of 2. Encouraged patient to take it slow, and to stay close to walker for safety. Agreeable. With continued Marie yesterday, Psych changed medications again. Mood appears improved this afternoon. Asking to move to a Rehab that would be closer to his family.  speaking with Rehab, Insurance, and family. Participating in therapy this afternoon. VSSAF with noted HTN and tachycardia this morning. IM also following.       Review of Systems  Psychiatric: Has a history of bipolar disorder, anxiety, depression, and claustrophobia.      Depression/Anxiety:  denies depression. States that he takes Seroquel every day. Home psych medications reviewed, continued,  and Clonazepam added PRN Anxiety (home med).    QUEtiapine tablet 400 mg qHS.   ADD lithium capsule 300 mg q12h  diazePAM tablet 5 mg q8h PRN Anxiety  Pain: back pain radiating down hips and legs, numbness/tingling to BLE     acetaminophen tablet 650 mg TID  baclofen tablet 10 mg TID. QID  dexAMETHasone tablet 2 mg q12h  pregabalin capsule 75 mg TID  acetaminophen tablet 650 mg q8h PRN mild pain  oxyCODONE immediate release tablet 5 mg q4h PRN mod pain  oxyCODONE immediate release tablet 10 mg q4h PRN severe pain  Bowels/Bladder: last BM 9/12 x 2    Appetite:  good   Sleep: fair  QUEtiapine tablet 400 mg qHS.   hydrOXYzine pamoate capsule 50 mg qHS PRN Insomnia          Physical Exam  General: well-developed, well-nourished, lethargic, "not feeling well"  Respiratory: equal chest rise, no SOB, no audible wheeze  Cardiovascular: regular rate and rhythm, no " edema  Gastrointestinal: soft, non-tender, non-distended   Musculoskeletal: BLE weakness  Integumentary: no rashes or skin lesions present  Neurologic: cranial nerves intact, lumbar radiculopathy  *MD performed and documented physical examination             Diagnostics:  CT LUMBAR SPINE WITHOUT CONTRAST   FINDINGS:  There are 4 non-rib-bearing lumbar type vertebral bodies with hypoplastic ribs at L1 and transitional type S1 vertebral body.  There are postoperative changes with posterior spinal fusion hardware and laminectomies at L2 through S1.  The hardware is intact.  There is lucency around the right S1 screw measuring up to 2 mm in thickness, new from the prior exam.  There is unchanged grade 1 retrolisthesis of L2 over L3 and grade 1 anterolisthesis L5 over S1.  The vertebral heights are maintained.  There is no acute fracture identified.  There is moderate neural foraminal narrowing bilaterally at L5-S1, moderate at other levels.  There is fluid in the laminectomy beds.  Soft tissue drain has been removed.  Impression:  1. No acute fracture identified.  2. Small perihardware lucency around the right S1 screw with otherwise stable postoperative changes.  Date:                                            09/12/2024  Time:                                           11:25            Assessment/Plan  Hospital   Neuritis or radiculitis due to rupture of lumbar intervertebral disc   Other spondylosis with radiculopathy, lumbar region   Lumbosacral radiculopathy due to degenerative joint disease of spine   Radiculopathy of sacral region   S/P lumbar spinal fusion     Non-Hospital   Chronic midline low back pain without sciatica   Benign prostatic hyperplasia with nocturia   Claudication   Cluster B personality disorder   External hemorrhoid, bleeding   Former smoker, stopped smoking many years ago   ADALI (generalized anxiety disorder)   History of bipolar disorder   History of colon polyps   Essential hypertension    Hyperlipidemia   IFG (impaired fasting glucose)   Palpitations   Polyarthralgia   Prediabetes   Tobacco use disorder, moderate, in sustained remission       Wounds: back mnfohabw-[id number]-h/d/I  S/p TLIF L2-S1 decompression and fusion on 8/19, by Dr. August. Drain was placed to site  Precautions: spinal  Bracing: LSO  Swallowing: Regular Diet (Low Sodium)  Function: Tolerating therapy. Continue PT/OT  VTE Prophylaxis:   enoxaparin injection 40 mg SubQ q24h  Code Status: FULL CODE   Discharge: Plans to live with his daughter in [place] in a single-story home with 1 threshold step to enter the residence. Completed 9th grade. He has no  history. Prior to 7 months ago, pt. Was working full time offshore. Wife and their daughter manage the home, cook, grocery shop, do laundry, and clean.  Reports that he is  from his wife.  He stated that they split up and had to sell their home.  They were living in [place].  He will be going to live with one of his daughters in [place].  If that does not work out, he will move in with his sister in the [facility]. Children: (3). Date pending.                Kelsey Gonsalez NP, conducted additional independent physical examination and assisted with medical documentation.

## 2024-09-12 NOTE — PROGRESS NOTES
[facility] ([facility])  Rehab Progress Note    Patient Name: Lionel Nix  MRN: [id number]  Age: 59 y.o. Sex: male  : 1965  Hospital Length of Stay: 8 days  Date of Service: 2024   Chief Complaint: Severe lumbar stenosis with radiculopathy and spondylosis s/p TLIF L2-S1 decompression and L3-S1 fusion on      Subjective:     Basic Information  Admit Information: 59-year-old male presented to [facility] on  for scheduled lumbar decompression and fusion. PMH significant for hypertension, hyperlipidemia, anxiety, depression.  On  tolerated TLIF L2-S1 decompression and L3-S1 fusion without perioperative complications.  On  discontinued TRAVIS drain.  Postop CT scan with good placement of hardware.  Psychiatry evaluated on .  Continued Effexor, Seroquel, and initiated Trileptal.  Continued with postoperative pain.  Lyrica initiated along with increase in hypertensive medications.  Reported fall on .  MRI cervical spine with no acute traumatic findings identified and degenerative listhesis at C4-5 and C6-7.  No neurosurgical interventions planned for C-spine. Tolerated transfer to [facility] inpatient rehab unit on  without incident.   Today's Information: No acute events overnight.  Sitting in chair.  Reports fair sleep.  Appetite is good.  Last BM .  Reported assisted fall earlier today.  Still somewhat agitated.  Heart rate 100-120.  Psychiatry did evaluate yesterday and DC Lamictal.  Initiate lithium 300 mg b.i.d..  Remains on Seroquel 400 mg at bedtime.  Other than mild tachycardia vital signs at goal with no recorded fevers.  No new labs today.  CT lumbar spine with no acute fracture identified.  Small perihardware lucency around the right S1 screw with otherwise stable postoperative changes.    Review of patient's allergies indicates:   Allergen Reactions    Gabapentin Anxiety and Palpitations        Current Facility-Administered Medications:      acetaminophen tablet 650 mg, 650 mg, Oral, TID, Wallace Kelsey A, FNP, 650 mg at 09/12/24 0514    acetaminophen tablet 650 mg, 650 mg, Oral, Q8H PRN, Wallace Kelsey A, FNP    acetylcysteine 200 mg/ml (20%) solution 2 mL, 2 mL, Nebulization, TID, Yudelka, Carter A, FNP, 2 mL at 09/12/24 0708    albuterol-ipratropium 2.5 mg-0.5 mg/3 mL nebulizer solution 3 mL, 3 mL, Nebulization, Q4H PRN, Yudelka, Carter A, FNP, 3 mL at 09/12/24 0708    atorvastatin tablet 40 mg, 40 mg, Oral, QHS, Yudelka, Carter A, FNP, 40 mg at 09/11/24 2034    baclofen tablet 10 mg, 10 mg, Oral, QID, Devang Cheung Sr., MD, 10 mg at 09/12/24 0514    benzonatate capsule 100 mg, 100 mg, Oral, TID PRN, Yudelka, Carter A, FNP, 100 mg at 09/10/24 1401    carvediloL tablet 12.5 mg, 12.5 mg, Oral, BID, Alivia Garcia, FNP, 12.5 mg at 09/12/24 0927    cetirizine tablet 10 mg, 10 mg, Oral, After dinner, Florence Gonsalezhryn A, FNP, 10 mg at 09/11/24 1757    dexAMETHasone tablet 2 mg, 2 mg, Oral, Q12H, Alivia Garcia, FNP, 2 mg at 09/12/24 0928    diazePAM tablet 5 mg, 5 mg, Oral, Q8H PRN, Wallace Kelsey A, FNP, 5 mg at 09/11/24 1313    docusate sodium capsule 100 mg, 100 mg, Oral, BID, Uydelka, Carter A, FNP, 100 mg at 09/12/24 0929    enoxaparin injection 40 mg, 40 mg, Subcutaneous, Q24H (prophylaxis, 1700), Yudelka, Carter A, FNP, 40 mg at 09/11/24 1757    fluticasone propionate 50 mcg/actuation nasal spray 100 mcg, 2 spray, Each Nostril, Daily, Kelsey Gonsalez, MIGEL, 100 mcg at 09/11/24 0845    hydrALAZINE injection 10 mg, 10 mg, Intravenous, Q4H PRN, Yudelka, Carter A, FNP    hydroCHLOROthiazide tablet 25 mg, 25 mg, Oral, Daily, Alivia Garcia, FNP, 25 mg at 09/12/24 0928    HYDROcodone-acetaminophen 5-325 mg per tablet 1 tablet, 1 tablet, Oral, Daily PRN, Yudelka, Carter A, FNP, 1 tablet at 09/11/24 0842    labetalol 20 mg/4 mL (5 mg/mL) IV syring, 10 mg, Intravenous, Q4H PRN, Yudelka, Carter A, FNP, 10 mg at  "09/06/24 2149    lisinopriL tablet 40 mg, 40 mg, Oral, Daily, Alivia Garcia, MIGEL, 40 mg at 09/12/24 0928    lithium capsule 300 mg, 300 mg, Oral, Q12H, Virgil Chaudhari NP, 300 mg at 09/12/24 0928    metoprolol injection 10 mg, 10 mg, Intravenous, Q2H PRN, Jake Jhaothy A, FNP    nitroGLYCERIN SL tablet 0.4 mg, 0.4 mg, Sublingual, Q5 Min PRN, Yudelka Carter A, FNP    ondansetron disintegrating tablet 8 mg, 8 mg, Oral, Q8H PRN, Yudelka Carter A, FNP    ondansetron injection 4 mg, 4 mg, Intravenous, Q8H PRN, Yudelka, Carter A, FNP    oxyCODONE immediate release tablet 10 mg, 10 mg, Oral, Q4H PRN, Kelsey Gonsalez, FNP, 10 mg at 09/12/24 0928    oxyCODONE immediate release tablet 5 mg, 5 mg, Oral, Q4H PRN, Chuckie Gonsalezyn A, FNP    polyethylene glycol packet 17 g, 17 g, Oral, BID PRN, Carter Jha, FNP, 17 g at 09/07/24 0926    pregabalin capsule 75 mg, 75 mg, Oral, TID, Chucike Gonsalezyn CHRISSY, FNP, 75 mg at 09/12/24 0514    QUEtiapine tablet 400 mg, 400 mg, Oral, Nightly, Virgil Chaudhari NP, 400 mg at 09/11/24 2034    tamsulosin 24 hr capsule 0.4 mg, 0.4 mg, Oral, QHS, Jake Jhaothy A, FNP, 0.4 mg at 09/11/24 2034    vitamin renal formula (B-complex-vitamin c-folic acid) 1 mg per capsule 1 capsule, 1 capsule, Oral, Daily, Kelsey Gonsalez, FNP, 1 capsule at 09/12/24 0928     Review of Systems   Complete 12-point review of symptoms negative except for what's mentioned in HPI     Objective:     BP (!) 160/101   Pulse (!) 118   Temp 97.6 °F (36.4 °C) (Oral)   Resp 20   Ht 5' 8" (1.727 m)   Wt 104.3 kg (229 lb 15 oz)   SpO2 97%   BMI 34.96 kg/m²        Physical Exam  Vitals reviewed.   Eyes:      Pupils: Pupils are equal, round, and reactive to light.   Cardiovascular:      Rate and Rhythm: Normal rate and regular rhythm.      Heart sounds: Normal heart sounds.   Pulmonary:      Effort: Pulmonary effort is normal.      Breath sounds: Normal breath sounds.   Abdominal:      General: " Bowel sounds are normal.   Musculoskeletal:         General: Normal range of motion.   Skin:     General: Skin is warm.      Comments: Lumbar surgical incision dry and intact    Neurological:      General: No focal deficit present.      Mental Status: He is alert and oriented to person, place, and time.      Motor: Weakness present.   Psychiatric:         Mood and Affect: Mood is anxious.         Speech: Speech is rapid and pressured.         Behavior: Behavior is aggressive.         Cognition and Memory: Cognition normal.         Judgment: Judgment is impulsive.     *MD performed and documented physical examination       Lines/Drains/Airways       None                   Labs  No results found for this or any previous visit (from the past 24 hour(s)).  Radiology   CT lumbar spine without contrast on 09/12/2024, IMPRESSION:  No acute fracture identified.  Small perihardware lucency around the right S1 screw with otherwise stable postoperative changes.  Radiology   Lumbar XR on 09/07/2024, IMPRESSION:  Postsurgical changes with no adverse interval change appreciated.    Radiology   Cervical XR on 09/07/2024, IMPRESSION:  No adverse interval change appreciated.  The lobar cervical spine is not well visualized.  Radiology  MRI cervical spine 9/4: Impression: No acute traumatic findings identified. Degenerative listhesis at C4-5 and C6-7.   Radiology  X-ray lumbar spine 8/26: Impression: Postoperative changes of posterior fusion with intact hardware     Assessment/Plan:     59 y.o. WM admitted on 9/4/2024     Severe lumbar stenosis with radiculopathy and spondylosis   - s/p TLIF L2-S1 decompression and L3-S1 fusion on 08/19  - LSO brace when OOB, spinal precautions  - continue                 Dexamethasone 2 mg b.i.d. (continue taper)                 Lyrica 75 mg t.i.d.                 Baclofen 10 mg q.i.d. (increased 9/10)  - follow-up with Neurosurgery outpatient    Cough  - current  - initiate   Mucomyst nebs b.i.d.  (initiated 9/10)   DuoNebs q.4 hours p.r.n.     Bipolar disorder  - manic overnight, improved this morning  - discontinued Venlafaxine 300 mg daily on 9/10  - discontinued Oxcarbazepine 150 mg b.i.d. on 9/10  - discontinued Lamictal 25 mg daily on 09/11  - continue               Lithium 300 mg b.i.d. (initiated 9/11)                             Seroquel 400 mg at night (increased 9/9)     Valium 5 mg b.i.d. p.r.n. anxiety (initiated 9/6)  - follow-up with psychiatrist outpatient     HLD  - FLP outpatient  - continue                Atorvastatin 40 mg at night      HTN  - blood pressure at goal  - continue                Coreg 12.5 mg b.i.d.                  Hydrochlorothiazide 25 mg daily                  Lisinopril 40 mg daily                 Hydralazine 10 mg every 2 hours as needed for BP > 160/90                Labetalol 10 mg every 2 hours as needed for BP > 160/90  - low sodium diet     Constipation  - stable  - continue                 Colace 100 mg b.i.d.      BPH  - stable  - continue                 Tamsulosin 0.4 mg at night    Hyponatremia  - sodium 130-trending up  - Trileptal discontinued due to hyponatremia on 09/09  - encourage electrolyte drinks     VTE Prophylaxis:  Lovenox 40 mg daily     POA: no  Living will: no  Contacts: Clemenica Nix (daughter) [phone]                     Dee Dee (sister) [phone]     CODE STATUS: Full  Internal Medicine (attending): Thor King MD  Physiatry (consulting):  Devang Cheung MD     OUTPATIENT PROVIDERS  PCP: Rafy Poole MD   Neurosurgery:  Car August MD   Psychiatry: Josef Sutton MD in [place], [place]     DISPOSITION:  Sleep hygiene and bowel maintenance at goal.  Heart rate slightly elevated likely due to agitation.  Vital signs otherwise at goal with no recorded fevers.  No new labs today.  CT lumbar spine unremarkable.  Pending C-spine XR today.  Would benefit from Biofreeze to lower back.  Continue aggressive mobilization as tolerated.   Monitor closely.  Notify of acute changes.  Case management working on transfer to inpatient rehab closer to family in [place].    Staffing 9/9/2024: Continent of bowel and bladder. RT: Overall min assist.  Impulsive with poor safety. PT: Overall SBA.  Ambulating 50 feet partial mod to total assist. Poor carry over and safety awareness.  Needs constant verbal cueing. OT: Minimal progress. Complains of pain with medication.  Requires frequent redirection. Overall mod assist with ADLs. ST: Impaired cognition 2/2 bipolar. Projected discharge pending.     Jake Jha NP conducted independent physical examination and assisted with medical documentation.    Total time spent on this encounter including chart review and direct MD + NP 1-on-1 patient interaction: 51 minutes   Over 50% of this time was spent in counseling and coordination of care

## 2024-09-12 NOTE — DISCHARGE SUMMARY
[facility]  Neurosurgery  Discharge Summary      Patient Name: Lionel Nix  MRN: [id number]  Admission Date: 8/19/2024  Hospital Length of Stay: 16 days  Discharge Date and Time: 9/4/2024  1:32 PM  Attending Physician: Chiquis August MD   Discharging Provider: SMOOTH Mata  Primary Care Provider: Mitzy, Primary Doctor     HPI: Mr. Nix was evaluated outpatient for lumbar pain radiating into the bialteral lower extremities with burning, weakness, and numbness of the right lower extremity. He had undergone physical therapy and lumbar epidural injections with no relief of his symptoms. He underwent MRI lumbar spine demonstrating L3-S1 central narrowing and post op changes. He was consented and scheduled for surgery.     Procedure(s) (LRB):  FUSION, SPINE, LUMBAR, TLIF, USING COMPUTER-ASSISTED NAVIGATION (Left)     Hospital Course: Mr. Nix underwent L2-S1 decompression, L3/4, L4/5, L5/S1 TLIF and L2-S1 posterior fusion on 8/19/24. He tolerated the procedure without complication. He had some post op issues with pain control following and did experience a fall with negative imaging evaluation following. He also had slight increase in WBC with negative fever evaluation. He was transferred to rehab on 9/4/24.    Consults:   Consults (From admission, onward)          Status Ordering Provider     Inpatient consult to [facility]  Once        Provider:  (Not yet assigned)    CHIQUIS Jessica     Inpatient consult to Psychiatry  Once        Provider:  [facility]    Completed GAGAN CONNELLY            Significant Diagnostic Studies: N/A    Pending Diagnostic Studies:       None          Final Active Diagnoses:    Diagnosis Date Noted POA    PRINCIPAL PROBLEM:  Lumbosacral radiculopathy due to degenerative joint disease of spine [M47.27] 08/19/2024 Yes    Neuritis or radiculitis due to rupture of lumbar intervertebral disc [M51.16] 08/19/2024 Yes    Other spondylosis with  radiculopathy, lumbar region [M47.26] 08/19/2024 Yes      Problems Resolved During this Admission:      Discharged Condition: stable    Disposition: Skilled Nursing Facility    Follow Up:   Contact information for follow-up providers       Car August MD. Go on 9/17/2024.    Specialty: Neurosurgery  Why: Appt time @1117  Contact information:  [address]  [place] [address] [phone]                       Contact information for after-discharge care       Destination       [facility] .    Service: Inpatient Rehabilitation  Contact information:  [address]  [place] [address]  [phone]                     Home Medical Care       [facility] .    Service: Home Health Services  Contact information:  [address]  [facility] [address]  [phone]                                 Patient Instructions:      Diet general   Order Comments: RETURN TO PREVIOUS DIET     Diet general   Order Comments: RETURN TO PREVIOUS DIET     Wound care routine (specify)   Order Comments: Wound care - dressings should be left in place for 48hrs following surgery. Bandages may be removed after 2 days and left open to air. You will see steri strips under your bandages-- DO NOT PEEL THEM OFF!  The steri strips will fall off spontaneously.  Any remaining steri strips will be removed at your post op visit.  Do NOT put any ointments, creams, or lotions on the incision unless otherwise instructed by your doctor.     Lifting restrictions   Order Comments: NO pushing, pulling or lifting for 6 weeks.  Do not lift anything heavier than 10 pounds.  At your post op visit you will be guided regarding increasing your activity.  Keep your automobile riding to a minimum and DO NOT drive until cleared by your doctor.     No driving, operating heavy equipment or signing legal documents while taking pain medication     Call MD for:   redness, tenderness, or signs of infection (pain, swelling, redness, odor or green/yellow discharge around incision site)     Call MD for:  persistent dizziness or light-headedness     Call MD for:  temperature >100.4     Call MD for:  severe uncontrolled pain     Remove dressing in 24 hours     Wound care routine (specify)   Order Comments: Wound care - dressings should be left in place for 48hrs following surgery. Bandages may be removed after 2 days and left open to air. You will see steri strips under your bandages-- DO NOT PEEL THEM OFF!  The steri strips will fall off spontaneously.  Any remaining steri strips will be removed at your post op visit.  Do NOT put any ointments, creams, or lotions on the incision unless otherwise instructed by your doctor.     Lifting restrictions   Order Comments: NO pushing, pulling or lifting for 6 weeks.  Do not lift anything heavier than 10 pounds.  At your post op visit you will be guided regarding increasing your activity.  Keep your automobile riding to a minimum and DO NOT drive until cleared by your doctor.     No driving, operating heavy equipment or signing legal documents while taking pain medication     Call MD for:  redness, tenderness, or signs of infection (pain, swelling, redness, odor or green/yellow discharge around incision site)     Call MD for:  persistent dizziness or light-headedness     Activity as tolerated   Scheduling Instructions: NO LIFTING OR STRAINING/ NO BENDING OR TWISTING     Activity as tolerated   Scheduling Instructions: NO LIFTING OR STRAINING/ NO BENDING OR TWISTING     Medications:  Reconciled Home Medications:      Medication List        CONTINUE taking these medications      atorvastatin 40 MG tablet  Commonly known as: LIPITOR  Take 1 tablet by mouth every evening.     cyclobenzaprine 10 MG tablet  Commonly known as: FLEXERIL  Take 10 mg by mouth Daily.     diazePAM 10 MG Tab  Commonly known as: VALIUM  Take 10 mg by mouth as needed.      gabapentin 300 MG capsule  Commonly known as: NEURONTIN  TAKE 1 CAPSULE BY MOUTH THREE TIMES DAILY     lisinopriL-hydrochlorothiazide 20-25 mg Tab  Commonly known as: PRINZIDE,ZESTORETIC  Take 1 tablet by mouth once daily.     metoprolol succinate 50 MG 24 hr tablet  Commonly known as: TOPROL-XL  Take 50 mg by mouth once daily.     QUEtiapine 300 MG Tab  Commonly known as: SEROQUEL  Take 300 mg by mouth nightly.     tamsulosin 0.4 mg Cap  Commonly known as: FLOMAX  Take 1 capsule by mouth every morning.     traMADoL 50 mg tablet  Commonly known as: ULTRAM  Take 50 mg by mouth as needed.     venlafaxine 150 MG Cp24  Commonly known as: EFFEXOR-XR  Take 300 mg by mouth once daily.              SMOOTH Mata  Neurosurgery  [facility]

## 2024-09-12 NOTE — PLAN OF CARE
Received [facility] message from [facility] that they have clinically accepted pt but will now have to see if his insurance will approve the transfer to their IRF.  Will await response.

## 2024-09-12 NOTE — PLAN OF CARE
"Spoke with wife Radha ([phone]) and had lengthy discussion about pt's improved mood when I just visited him.  Pt actually apologized for being so accusatory and negative about random staff members and explained that he has had so much pain and stress over losing his entire financial livelihood from his injury and has had some negative experiences with care, though not from everyone.  He acknowledged that he has some good therapists and nurses and that he does love the food.  He was especially grateful for his OT today, Carolann POWERS, who showered with him.  He also mentioned shift nurses/aides today and overnight nurse, Carter ("my panchito").  He thanked me for assisting him in trying to transition closer to his family's home near [place].  I did reiterate that his insurance will have to approve of the transfer for him to be able to do so.      Overall, herrera is slightly improved since yesterday.  More positive in his attitude.      Radha agrees that his mood is better, though she was concerned that pt fell again.  Pt said, "I fall all the time" when we discussed.  He said, "I could have fallen with anyone or even just by myself."    I did relay my thoughts to wife and dtr that they are only hearing one side (from pt's manic state) of every story and that likely, they would be less stressed about this situation if they were in town and able to visit with pt in person.  They agreed.  Wife will likely come to stay with pt this weekend when overnight visitation is allowed.  I think that may help.  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      Radha also indicated that both of her daughters have worked here ([facility] facilities) and that they did not have positive experiences (reportedly with some staff members being lazy and sitting instead of doing their jobs), so that has likely " tempered their concerns about pt's care as well, though she acknowledged that she knows this is not to imply that everyone in the system is the same.

## 2024-09-12 NOTE — PT/OT/SLP RE-EVAL
Occupational Therapy Inpatient Rehab Re-Evaluation    Name: Lionel Nix  MRN: [id number]    Recommendations:     Discharge Recommendations:  Low Intensity Therapy   Discharge Equipment Recommendations: bath bench, bedside commode, grab bar, walker, rolling   Barriers to discharge:  Manic state, impulsiveness, impaired functional mobility and decreased independence with ADLs & IADLs and decreased safety awareness.    Assessment:  Lionel Nix is a 59 y.o. male admitted with a medical diagnosis of S/P lumbar spinal fusion.  He presents with the following impairments/functional limitations:  weakness, impaired endurance, impaired self care skills, impaired functional mobility, gait instability, impaired balance, impaired cognition, decreased lower extremity function, decreased safety awareness, pain, edema, orthopedic precautions.    General Precautions: Standard, fall     Orthopedic Precautions:spinal precautions     Braces: LSO    Rehab Prognosis: Good; patient would benefit from acute skilled OT services to address these deficits and reach maximum level of function.      History:     Past Medical History:   Diagnosis Date    Anxiety     Chronic right-sided low back pain     Enlarged prostate     Hyperlipidemia     Hypertension     Lumbosacral radiculopathy due to degenerative joint disease of spine     Neuritis or radiculitis due to rupture of lumbar intervertebral disc     Numbness and tingling of both feet     Numbness and tingling of both legs     Obesity     Other spondylosis with radiculopathy, lumbar region        Past Surgical History:   Procedure Laterality Date    BACK SURGERY      COLONOSCOPY      HERNIA REPAIR      SHOULDER SURGERY Left     TRANSFORAMINAL LUMBAR INTERBODY FUSION (TLIF) USING COMPUTER-ASSISTED NAVIGATION Left 8/19/2024    Procedure: FUSION, SPINE, LUMBAR, TLIF, USING COMPUTER-ASSISTED NAVIGATION;  Surgeon: Car August MD;  Location: [facility];  Service: Neurosurgery;  Laterality: Left;   "L2 - S1 TRANSFORAMINAL INTERBODY FUSION / POSTERIOR LATERAL FUSION // PRONE PEDRO // DRILL // MICROSCOPE // O-ARM // DIRECT SPINE // NDM       Subjective     Orientation: Oriented x4    Chief Complaint: pain    Patient/Family Comments/goals: "My back really hurts." "Both of my legs are numb."    Vitals  Vitals at Rest  BP     HR     O2 Sat     Pain Pain Rating 1: 7/10  Location - Side 1: Bilateral  Location - Orientation 1: lower  Location 1: back  Pain Addressed 1: Pre-medicate for activity, Reposition, Distraction, Cessation of Activity  Pain Rating Post-Intervention 1: 6/10     Vitals With Activity  BP     HR     O2 Sat     Pain Pain Rating 1: 7/10  Location - Side 1: Bilateral  Location - Orientation 1: lower  Location 1: back  Pain Addressed 1: Pre-medicate for activity, Reposition, Distraction, Cessation of Activity  Pain Rating Post-Intervention 1: 6/10     Respiratory Status: Room air    Patients cultural, spiritual, Sabianism conflicts given the current situation: no       Living Environment   Living Environment  People in Home:  (Pt. reports he used to live with eldest daughter and wife but will be staying with sister upon d/c)  Living Arrangements: house  Home Accessibility: stairs within home (Simultaneous filing. User may not have seen previous data.)  Number of Stairs, Main Entrance: none (just a threshold)  Stairs, Within Home, Primary:  (a flight)  Number of Stairs, Within Home, Primary: other (see comments) (flight of stairs)  Home Layout: Able to live on 1st floor  Living Environment Comment:  (Pt. reports he used to live with eldest daughter and wife but will be staying with sister upon d/c)  Equipment Currently Used at Home: none (patient reports no DME or AD)  Shower Setup: Tub/Shower combo and Walk-in shower    Prior Level of Function  BADL: Independent    IADL: Independent    Equipment used at home: none (patient reports no DME or AD).  DME owned (not currently used): none.      Upon " "discharge, patient will have assistance from Wife and children.    Objective:     Patient found up in chair with back brace upon OT entry to room.    Mobility   Patient completed:  Sit to Stand Transfer with contact guard assistance with rolling walker  Stand to Sit Transfer with contact guard assistance with rolling walker  Toilet Transfer Step Transfer technique with contact guard assistance with  rolling walker  Tub Transfer Stand Pivot technique with contact guard assistance with rolling walker  **NOTE: Due to fall that occurred prior to session a second person was present for safety but did not assist with transfers.    Functional Mobility   Short distance 1-2 feet with RW in room for ADLs with CGA and verbal cues.    ADLs     Current Status   Eating 6   Oral Hygiene 6   Shower, Bathe Self 4 for safety - pt did not stand to clean bottom - used hip hike technique   Upper Body Dressing 6 including LSO brace   Lower Body Dressing 3 use reacher and assist needed to bring over hips in partial stand using grab bars in shower for safety.   Toileting Hygiene 3 assist with pulling up pants over hips   Toilet Transfer 4 CGA with RW with 2nd person present for safety   Putting On, Taking Off Footwear 4 used reacher and sock aid     Limiting Factors for ADLs: motor, sensory, psychsocial, endurance, limited ROM, balance, weakness, body habitus, perceptual, cognition, safety awareness, and pain    Exams     ROM:          -       WFL    Hand Dominance: Right    ROM Hand  Left Hand: WFL  Right Hand: WFL    Strength  Overall Strength:          -       WFL     Strength:   WFL    Pinch Strength:   WFL    Sensation of UE  Left:          -       WNL  Right:          -       WNL  **Pt states "both of my legs are numb." See PT note for details on sensation.    Coordination:      -       Intact    Tone  Left: WNL  Right: WNL    Visual/Perceptual  Intact with glasses    Cognition:   WFL but manic with difficulty finishing thoughts, " easily distracted and labile with temper.    Balance    Sitting  Sitting Surface: TTB  Static: One UE Extremity, Fair (+)  Dynamic: One UE Extremity, Fair    Standing  Static: Bilateral UE Extremity Support, Fair (-)  Dynamic: Bilateral UE extremity support, Poor (+)    Patient left up in chair with all lines intact, call button in reach, and chair alarm on.    Education provided: Roles and goals of OT, ADLs, transfer training, assistive device, wheelchair precautions, modified goals, sequencing, safety precautions, fall prevention, equipment recommendations, and home safety    Multidisciplinary Problems       Occupational Therapy Goals          Problem: Occupational Therapy    Goal Priority Disciplines Outcome Interventions   Occupational Therapy Goal     OT, PT/OT Progressing    Description: STG's to review at Re-eval:    MET - Patient will demonstrate improved independence with upper body dressing while efficiently donning shirt and LSO brace with SPV.   Progressing - Patient will demonstrate improved energy conservation and safety awareness while utilizing safety strategies taught during ADLs and transfers with Min cueing.   Progressing - Patient will perform lower body dressing with use of AE prn and SPV.   Progressing - Patient will perform toilet hygiene with CGA while maintaining spinal precautions and use of RW/grab bars as needed.  Progressing - Patient will perform tub transfer with SBA and use of RW.   MET - Patient will perform showering with SPV for safety while maintaining precautions and using TTB and grab bars as needed.   Updated goal: Patient will perform showering with set up while maintaining precautions and using TTB and grab bars as needed.                       Plan     During this hospitalization, patient to be seen 5 x/week (5-7 days per week) to address the identified rehab impairments via self-care/home management, therapeutic activities, therapeutic exercises, wheelchair  management/training, cognitive retraining and progress toward the following goals:    Plan of Care Expires:  09/18/24    Time Tracking     OT Received On: 09/12/24  Time In 0930     Time Out 1030  Total Time 60 min  Therapy Time: OT Individual: 60  Missed Time:    Missed Time Reason:      Billable Minutes: Re-eval 15 and Self Care/Home Management 45    09/12/2024

## 2024-09-12 NOTE — PT/OT/SLP PROGRESS
Physical Therapy Inpatient Rehab Treatment    Patient Name:  Lionel Nix   MRN:  [id number]    Recommendations:     Discharge Recommendations:  Low Intensity Therapy   Discharge Equipment Recommendations:     Barriers to discharge: Increased level of assist, Decreased caregiver support, Ongoing medical treatment, Impaired functional mobility , and Severity of Deficits     Assessment:     Lionel Nix is a 59 y.o. male admitted with a medical diagnosis of S/P lumbar spinal fusion.  He presents with the following impairments/functional limitations:  weakness, impaired endurance, impaired functional mobility, gait instability, impaired balance, decreased safety awareness, pain, decreased ROM, orthopedic precautions .    Rehab Diagnosis: Severe lumbar stenosis with radiculopathy and spondylosis s/p TLIF L2-S1 decompression and fusion 8/19/2024. Pt. Has a past medical history of HTN, HLD, severe lumbar stenosis with lumbosacral radiculopathy and degenerative joint disease of the lumbar spine.    General Precautions: Standard, fall     Orthopedic Precautions:spinal precautions     Braces: LSO    Rehab Prognosis: Fair; patient would benefit from acute skilled PT services to address these deficits and reach maximum level of function.      History:     Past Medical History:   Diagnosis Date    Anxiety     Chronic right-sided low back pain     Enlarged prostate     Hyperlipidemia     Hypertension     Lumbosacral radiculopathy due to degenerative joint disease of spine     Neuritis or radiculitis due to rupture of lumbar intervertebral disc     Numbness and tingling of both feet     Numbness and tingling of both legs     Obesity     Other spondylosis with radiculopathy, lumbar region        Past Surgical History:   Procedure Laterality Date    BACK SURGERY      COLONOSCOPY      HERNIA REPAIR      SHOULDER SURGERY Left     TRANSFORAMINAL LUMBAR INTERBODY FUSION (TLIF) USING COMPUTER-ASSISTED NAVIGATION Left 8/19/2024     Procedure: FUSION, SPINE, LUMBAR, TLIF, USING COMPUTER-ASSISTED NAVIGATION;  Surgeon: Car August MD;  Location: [facility] OR;  Service: Neurosurgery;  Laterality: Left;  L2 - S1 TRANSFORAMINAL INTERBODY FUSION / POSTERIOR LATERAL FUSION // PRONE PEDRO // DRILL // MICROSCOPE // O-ARM // DIRECT SPINE // NDM       Subjective     Patient comments: multiple c/o    Respiratory Status: Room air    Patients cultural, spiritual, Jainism conflicts given the current situation: no    Objective:     Communicated with nursing  prior to session.  Patient found up in chair with peripheral IV  upon PT entry to room.    Pt is Oriented x3 and  resistant to , Alert, Cooperative, Agitated, and Impulsive.    Vitals   Vitals With Activity  BP     HR     O2 Sat     Pain Pain Rating 1: 9/10  Location - Side 1: Bilateral  Location 1: back  Pain Addressed 1: Pre-medicate for activity, Distraction, Reposition       Functional Mobility:   Transfers:     Toilet Transfer: Supervision or touching assistance  with  rolling walker  using  Step Transfer and with vc for safety and fall prevention pt with poor safety awareness. + void and bm      Current   Status  Discharge   Goal   Functional Area: Care Score:    Roll Left and Right   Independent   Sit to Lying   Independent   Lying to Sitting on Side of Bed   Independent   Sit to Stand 4  Cga with use of rw and vc for hand placement for safety in pm tx performed multiple times with vc for hand placement for improved safety and carry over for safety awareness.  Independent   Chair/Bed-to-Chair Transfer   Independent   Car Transfer   Independent   Walk 10 Feet 1 Independent   Walk 50 Feet with Two Turns 1 Independent   Walk 150 Feet 1  Cga of 2 persons for safety (pt with multiple falls) with wc close behind for safety 100ft then 250ft vc for proximity within rw for safety seated rest break B ace wraps to assist in knee stability , in pm tx ambulated per pt request 300ft the 250ft  Independent  "  Wheel 50 Feet with Two Turns 5 Not applicable   Wheel 150 Feet 5  200ft BUE only , in am and pm tx 150ft-250ft multiple trials during txs  Not applicable       Therapeutic Activities and Exercises:  Pt performed seated HEP with 3# wts BLE 2 sets 15-25 reps with vc for focus on task pt seen in outside community environment   Dynmanic sitting balance + void in urinal few times during txs set up .   Sit<>stand performed multiple times for carry over safety with STS use of rw cga wc close     Activity Tolerance: Good    Patient left up in chair with call button in reach, chair alarm on, and wc seat belt on pt yesenia tx . Pt first refusing 1100 tx with multiple c/o agitated argumentative with therapist about tx/participation pt then calling nurse desk at 1130 requesting therapist come back agreeable to tx. Pt ambulated assist of 2 persons for safety and fall prevention with c/o multiple issues during 30min tx. Stating during ambulated when cued to let therapist know when he needed to sit pt stated " I will just walk until I fall" pt educated that that's not a safe thing to do to inform therapist when he needs to sit safely . In pm tx pt more motivated with tx activities and tx time.   .  In pm tx educated pt on importance of maintaining spinal pxs especially with bed mob during night .   Education provided: transfer training, gait training, balance training, safety awareness, assistive device, wheelchair management, strengthening exercises, fall prevention, and spinal precautions    Expected compliance: High compliance    Plan:     During this hospitalization, patient to be seen 5 x/week to address the identified rehab impairments via gait training, therapeutic activities, therapeutic exercises and progress toward the following goals:    GOALS:   Multidisciplinary Problems       Physical Therapy Goals          Problem: Physical Therapy    Goal Priority Disciplines Outcome Goal Variances Interventions   Physical Therapy " "Goal     PT, PT/OT Progressing     Description: Bed Mobility:  Roll left and right independently.   Sit to supine transfer independently.   Supine to sit transfer independently.     Transfers:  Sit to stand transfer independently using RW.   Bed to chair transfer independently Stand Step  using RW.   Car transfer with supervision/touching assist using RW.    an object from the ground in standing position with supervision/touching assist using RW.     Mobility:  Ambulate 150 feet with supervision/touching assist using RW.  Ambulate 10 feet on uneven surfaces/ramps with supervision/touching assist using RW.   Pt ascended/descended a 4 inch curb with supervision/touching assist using RW.   Ascend/descend 4 stairs with supervision/touching assist using bilateral handrails.                          Plan of Care Expires:  09/14/24  PT Next Visit Date: 09/13/24  Plan of Care reviewed with: patient    Additional Information:         Time Tracking:     Therapy Time  PT Received On: 09/12/24  PT Start Time:  (1100; 1130; 1300; 1500)  PT Stop Time:  (1130; 1200; 1400; 1530)   PT Individual: 120  Missed Time: 30 Minutes refused [id number]  Time Missed due to: Patient unwilling to participate, Other (Comment) (pt refusing tx at 1100 upset about falling this am stated "i guess i have to do your job and the hopsitals job im waiting until results from xray are back , im calling my doctors office ")    Billable Minutes: Gait Training 60min, Therapeutic Activity 45min, and Therapeutic Exercise 15min    09/12/2024  "

## 2024-09-13 VITALS
BODY MASS INDEX: 34.85 KG/M2 | RESPIRATION RATE: 20 BRPM | WEIGHT: 229.94 LBS | SYSTOLIC BLOOD PRESSURE: 153 MMHG | HEIGHT: 68 IN | HEART RATE: 93 BPM | TEMPERATURE: 98 F | OXYGEN SATURATION: 98 % | DIASTOLIC BLOOD PRESSURE: 94 MMHG

## 2024-09-13 LAB
ANION GAP SERPL CALC-SCNC: 9 MEQ/L
BASOPHILS # BLD AUTO: 0.01 X10(3)/MCL
BASOPHILS NFR BLD AUTO: 0.1 %
BUN SERPL-MCNC: 12.2 MG/DL (ref 8.4–25.7)
CALCIUM SERPL-MCNC: 9.6 MG/DL (ref 8.8–10)
CHLORIDE SERPL-SCNC: 100 MMOL/L (ref 98–107)
CO2 SERPL-SCNC: 28 MMOL/L (ref 23–31)
CREAT SERPL-MCNC: 0.68 MG/DL (ref 0.73–1.18)
CREAT/UREA NIT SERPL: 18
EOSINOPHIL # BLD AUTO: 0 X10(3)/MCL (ref 0–0.9)
EOSINOPHIL NFR BLD AUTO: 0 %
ERYTHROCYTE [DISTWIDTH] IN BLOOD BY AUTOMATED COUNT: 13.1 % (ref 11.5–17)
GFR SERPLBLD CREATININE-BSD FMLA CKD-EPI: >60 ML/MIN/1.73/M2
GLUCOSE SERPL-MCNC: 136 MG/DL (ref 82–115)
HCT VFR BLD AUTO: 35 % (ref 42–52)
HGB BLD-MCNC: 11.4 G/DL (ref 14–18)
IMM GRANULOCYTES # BLD AUTO: 0.03 X10(3)/MCL (ref 0–0.04)
IMM GRANULOCYTES NFR BLD AUTO: 0.3 %
LYMPHOCYTES # BLD AUTO: 0.86 X10(3)/MCL (ref 0.6–4.6)
LYMPHOCYTES NFR BLD AUTO: 9.6 %
MCH RBC QN AUTO: 30 PG (ref 27–31)
MCHC RBC AUTO-ENTMCNC: 32.6 G/DL (ref 33–36)
MCV RBC AUTO: 92.1 FL (ref 80–94)
MONOCYTES # BLD AUTO: 0.51 X10(3)/MCL (ref 0.1–1.3)
MONOCYTES NFR BLD AUTO: 5.7 %
NEUTROPHILS # BLD AUTO: 7.59 X10(3)/MCL (ref 2.1–9.2)
NEUTROPHILS NFR BLD AUTO: 84.3 %
NRBC BLD AUTO-RTO: 0 %
PLATELET # BLD AUTO: 179 X10(3)/MCL (ref 130–400)
PMV BLD AUTO: 8.7 FL (ref 7.4–10.4)
POTASSIUM SERPL-SCNC: 4.2 MMOL/L (ref 3.5–5.1)
RBC # BLD AUTO: 3.8 X10(6)/MCL (ref 4.7–6.1)
SODIUM SERPL-SCNC: 137 MMOL/L (ref 136–145)
WBC # BLD AUTO: 9 X10(3)/MCL (ref 4.5–11.5)

## 2024-09-13 PROCEDURE — 63600175 PHARM REV CODE 636 W HCPCS: Performed by: NURSE PRACTITIONER

## 2024-09-13 PROCEDURE — 80048 BASIC METABOLIC PNL TOTAL CA: CPT | Performed by: NURSE PRACTITIONER

## 2024-09-13 PROCEDURE — 99233 SBSQ HOSP IP/OBS HIGH 50: CPT | Mod: ,,, | Performed by: NURSE PRACTITIONER

## 2024-09-13 PROCEDURE — 25000003 PHARM REV CODE 250: Performed by: NURSE PRACTITIONER

## 2024-09-13 PROCEDURE — 97530 THERAPEUTIC ACTIVITIES: CPT

## 2024-09-13 PROCEDURE — 25000003 PHARM REV CODE 250: Performed by: PHYSICAL MEDICINE & REHABILITATION

## 2024-09-13 PROCEDURE — 97164 PT RE-EVAL EST PLAN CARE: CPT

## 2024-09-13 PROCEDURE — 36415 COLL VENOUS BLD VENIPUNCTURE: CPT | Performed by: NURSE PRACTITIONER

## 2024-09-13 PROCEDURE — 85025 COMPLETE CBC W/AUTO DIFF WBC: CPT | Performed by: NURSE PRACTITIONER

## 2024-09-13 PROCEDURE — 25000003 PHARM REV CODE 250

## 2024-09-13 RX ORDER — OLANZAPINE 5 MG/1
10 TABLET ORAL DAILY
Status: DISCONTINUED | OUTPATIENT
Start: 2024-09-13 | End: 2024-09-13 | Stop reason: HOSPADM

## 2024-09-13 RX ADMIN — LITHIUM CARBONATE 300 MG: 300 CAPSULE, GELATIN COATED ORAL at 08:09

## 2024-09-13 RX ADMIN — PREGABALIN 75 MG: 75 CAPSULE ORAL at 05:09

## 2024-09-13 RX ADMIN — HYDROCHLOROTHIAZIDE 25 MG: 25 TABLET ORAL at 08:09

## 2024-09-13 RX ADMIN — Medication 1 CAPSULE: at 08:09

## 2024-09-13 RX ADMIN — PREGABALIN 75 MG: 75 CAPSULE ORAL at 01:09

## 2024-09-13 RX ADMIN — DEXAMETHASONE 2 MG: 2 TABLET ORAL at 08:09

## 2024-09-13 RX ADMIN — OXYCODONE HYDROCHLORIDE 10 MG: 5 TABLET ORAL at 05:09

## 2024-09-13 RX ADMIN — CARVEDILOL 12.5 MG: 6.25 TABLET, FILM COATED ORAL at 08:09

## 2024-09-13 RX ADMIN — OLANZAPINE 10 MG: 5 TABLET, FILM COATED ORAL at 01:09

## 2024-09-13 RX ADMIN — DOCUSATE SODIUM 100 MG: 100 CAPSULE, LIQUID FILLED ORAL at 08:09

## 2024-09-13 RX ADMIN — BACLOFEN 10 MG: 5 TABLET ORAL at 05:09

## 2024-09-13 RX ADMIN — BACLOFEN 10 MG: 5 TABLET ORAL at 11:09

## 2024-09-13 RX ADMIN — ACETAMINOPHEN 650 MG: 325 TABLET ORAL at 01:09

## 2024-09-13 RX ADMIN — OXYCODONE HYDROCHLORIDE 10 MG: 5 TABLET ORAL at 11:09

## 2024-09-13 RX ADMIN — ACETAMINOPHEN 650 MG: 325 TABLET ORAL at 05:09

## 2024-09-13 RX ADMIN — LISINOPRIL 40 MG: 10 TABLET ORAL at 08:09

## 2024-09-13 RX ADMIN — DIAZEPAM 5 MG: 5 TABLET ORAL at 08:09

## 2024-09-13 NOTE — PT/OT/SLP PROGRESS
"Occupational Therapy      Patient Name:  Lionel Nix   MRN:  [id number]    Patient not seen today secondary to Patient unwilling to participate. Stating "Please leave my room, I can't work with y'all today. I have to deal with this [employer]. Please leave." At that, OT left the room.    Amount of therapy minutes missed:  90 Minutes.    Will follow-up as able.    9/13/2024  "

## 2024-09-13 NOTE — NURSING
Nurses Note -- 4 Eyes      9/12/2024   7:00 PM      Skin assessed during: Q Shift Change      [] No Altered Skin Integrity Present    []Prevention Measures Documented      [x] Yes- Altered Skin Integrity Present or Discovered- no new skin issues   [] LDA Added if Not in Epic (Describe Wound)   [] New Altered Skin Integrity was Present on Admit and Documented in LDA   [] Wound Image Taken    Wound Care Consulted? No    Attending Nurse:  Janina Santoro RN/Staff Member:  MELODIE Alvarez at shift change

## 2024-09-13 NOTE — NURSING
Pt wanting to talk to nurse manager about leaving hospital he spoke with brenda NP, also I informed merlin about pt wishes, stated she will talk with Brenda about pt condition, then will go to see him.

## 2024-09-13 NOTE — PROGRESS NOTES
9/13/2024  Lionel Nix   1965   [id number]        Psychiatry Progress Note       SUBJECTIVE:   Lionel Nix is a 59 y.o. male with a past medical history that includes HTN, HLD, severe lumbar stenosis with lumbosacral radiculopathy and degenerative joint disease of the lumbar spine who was admitted to [facility] on 08/19/24 and underwent a TLIF L2-SA decompression and fusion. Presented to inpatient rehab to improve functional status at [facility] on 09/04/24. Was seen by psychiatry during initial hospitalization on 08/24/24 due to depression. Psychiatry consulted due to symptoms of herrera.     Continuing to display evidence of herrera. Displays an irritable affect and psychomotor agitation in the form of continuous upper extremity movement. Talkative, and while not pressured at this time he is difficult to interrupt. Has missed  therapy this morning as he has been calling his [facility] company to [facility]. Also reports making calls to Corby Sullivan (local congressman) as well as several names I do not recognize but based on context appear to have some importance. He reports that he has been mistreated here which is why he has been calling these people and wants to leave. He reports that he had a friend take all of his personal belongings out of his room because he reports someone accused him of drinking alcohol in his room. Also reported to have banned the current  from his room and he feels she has been making false reports about his behavior. Displays active paranoia. Denies any issues with sleep or appetite. Denies any adverse effects from medications. Denies suicidal ideations, homicidal ideations, or auditory/visual hallucinations.    Initially on effexor, trileptal, and quetiapine. Trileptal initially stopped due to hyponatremia and lamictal stated due to appearance of a mixed episode of bipolar as he was reporting depressed mood and tearful episodes. Lamictal stopped and lithium  started due to the fact that there was no more evidence of depression and mood episode now appears strictly manic. Effexor stopped due to evidence of herrera. Quetiapine initially increased, but as he was on this when episode of herrera started and the fact that increase has made no difference I will switch to olanzapine.        Current Medications:   Scheduled Meds:    acetaminophen  650 mg Oral TID    atorvastatin  40 mg Oral QHS    baclofen  10 mg Oral QID    carvediloL  12.5 mg Oral BID    cetirizine  10 mg Oral After dinner    dexAMETHasone  2 mg Oral Q12H    docusate sodium  100 mg Oral BID    enoxparin  40 mg Subcutaneous Q24H (prophylaxis, 1700)    fluticasone propionate  2 spray Each Nostril Daily    hydroCHLOROthiazide  25 mg Oral Daily    lisinopriL  40 mg Oral Daily    lithium  300 mg Oral Q12H    pregabalin  75 mg Oral TID    QUEtiapine  400 mg Oral Nightly    tamsulosin  0.4 mg Oral QHS    vitamin renal formula (B-complex-vitamin c-folic acid)  1 capsule Oral Daily      PRN Meds:   Current Facility-Administered Medications:     acetaminophen, 650 mg, Oral, Q8H PRN    albuterol-ipratropium, 3 mL, Nebulization, Q4H PRN    benzonatate, 100 mg, Oral, TID PRN    diazePAM, 5 mg, Oral, Q8H PRN    hydrALAZINE, 10 mg, Intravenous, Q4H PRN    HYDROcodone-acetaminophen, 1 tablet, Oral, Daily PRN    labetalol, 10 mg, Intravenous, Q4H PRN    metoprolol, 10 mg, Intravenous, Q2H PRN    nitroGLYCERIN, 0.4 mg, Sublingual, Q5 Min PRN    ondansetron, 8 mg, Oral, Q8H PRN    ondansetron, 4 mg, Intravenous, Q8H PRN    oxyCODONE, 10 mg, Oral, Q4H PRN    oxyCODONE, 5 mg, Oral, Q4H PRN    polyethylene glycol, 17 g, Oral, BID PRN   Psychotherapeutics (From admission, onward)      Start     Stop Route Frequency Ordered    09/11/24 2000  lithium capsule 300 mg         -- Oral Every 12 hours 09/11/24 1349    09/09/24 2100  QUEtiapine tablet 400 mg         -- Oral Nightly 09/09/24 1525    09/09/24 1629  diazePAM tablet 5 mg         --  Oral Every 8 hours PRN 09/09/24 1529            Allergies:   Review of patient's allergies indicates:   Allergen Reactions    Gabapentin Anxiety and Palpitations        OBJECTIVE:   Vitals   Vitals:    09/13/24 0538   BP:    Pulse:    Resp: 18   Temp:         Labs/Imaging/Studies:   Recent Results (from the past 36 hour(s))   Basic Metabolic Panel    Collection Time: 09/13/24  5:47 AM   Result Value Ref Range    Sodium 137 136 - 145 mmol/L    Potassium 4.2 3.5 - 5.1 mmol/L    Chloride 100 98 - 107 mmol/L    CO2 28 23 - 31 mmol/L    Glucose 136 (H) 82 - 115 mg/dL    Blood Urea Nitrogen 12.2 8.4 - 25.7 mg/dL    Creatinine 0.68 (L) 0.73 - 1.18 mg/dL    BUN/Creatinine Ratio 18     Calcium 9.6 8.8 - 10.0 mg/dL    Anion Gap 9.0 mEq/L    eGFR >60 mL/min/1.73/m2   CBC with Differential    Collection Time: 09/13/24  5:51 AM   Result Value Ref Range    WBC 9.00 4.50 - 11.50 x10(3)/mcL    RBC 3.80 (L) 4.70 - 6.10 x10(6)/mcL    Hgb 11.4 (L) 14.0 - 18.0 g/dL    Hct 35.0 (L) 42.0 - 52.0 %    MCV 92.1 80.0 - 94.0 fL    MCH 30.0 27.0 - 31.0 pg    MCHC 32.6 (L) 33.0 - 36.0 g/dL    RDW 13.1 11.5 - 17.0 %    Platelet 179 130 - 400 x10(3)/mcL    MPV 8.7 7.4 - 10.4 fL    Neut % 84.3 %    Lymph % 9.6 %    Mono % 5.7 %    Eos % 0.0 %    Basophil % 0.1 %    Lymph # 0.86 0.6 - 4.6 x10(3)/mcL    Neut # 7.59 2.1 - 9.2 x10(3)/mcL    Mono # 0.51 0.1 - 1.3 x10(3)/mcL    Eos # 0.00 0 - 0.9 x10(3)/mcL    Baso # 0.01 <=0.2 x10(3)/mcL    IG# 0.03 0 - 0.04 x10(3)/mcL    IG% 0.3 %    NRBC% 0.0 %          Psychiatric Mental Status Exam:  General Appearance: appears stated age, appropriately dressed, in no acute distress, sitting in chair  Arousal: alert  Behavior: cooperative, restless, appropriate eye-contact  Movements and Motor Activity: +psychomotor agitation  Orientation: oriented to person, place, and time  Speech: coherent, talkative  Mood: Irritable  Affect: reactive, full-range, mood-congruent, irritable  Thought Process:  circumstantial  Associations: no loosening of associations  Thought Content and Perceptions: no suicidal or homicidal ideation, no auditory or visual hallucinations, + paranoid ideation, no ideas of reference, no evidence of delusions or psychosis  Recent and Remote Memory: grossly intact; per interview/observation with patient  Attention and Concentration: grossly intact; per interview/observation with patient  Fund of Knowledge: grossly intact; based on history, vocabulary, fund of knowledge, syntax, grammar, and content  Insight: Questionable; based on understanding of severity of illness and HPI  Judgment: Questionable; based on patient's behavior and HPI    ASSESSMENT/PLAN:   Problems Addressed/Diagnoses:  Bipolar disorder, current episode manic, mild     Past Medical History:   Diagnosis Date    Anxiety     Chronic right-sided low back pain     Enlarged prostate     Hyperlipidemia     Hypertension     Lumbosacral radiculopathy due to degenerative joint disease of spine     Neuritis or radiculitis due to rupture of lumbar intervertebral disc     Numbness and tingling of both feet     Numbness and tingling of both legs     Obesity     Other spondylosis with radiculopathy, lumbar region         Plan:  Medication Management  Discontinue quetiapine  Olanzapine 10mg PO QHS  Lithium 300mg PO Q12hr  Recommend outpatient psychiatric follow-up  3.   Psychiatry will continue to follow        Virgil Chaudhari

## 2024-09-13 NOTE — PT/OT/SLP EVAL
"Physical Therapy Rehab Evaluation    Patient Name:  Lionel Nix   MRN:  [id number]    Recommendations:     Discharge Recommendations:  Low Intensity Therapy   Discharge Equipment Recommendations:     Barriers to discharge: Increased level of assist, Impaired functional mobility , and Severity of Deficits     Assessment:     Lionel Nix is a 59 y.o. male admitted with a medical diagnosis of S/P lumbar spinal fusion.  He presents with the following impairments/functional limitations:  weakness, impaired endurance, impaired self care skills, impaired functional mobility, gait instability, impaired balance, impaired cognition, decreased lower extremity function, decreased safety awareness, pain, edema, orthopedic precautions       PT NOTE: pt very angry and agitated throughout treatment, however, willing to participate. Pt requires redirection to task and is easily offended. Pt became more agitated talking with NP about CM. At beginning of treatment , pt stated multiple times "I've fallen 6 times since I been here." "My knee randomly give out." When pt first stood from w/c to perform t/f to bed, pt appeared to have inconsistent effort with full stand, however, was able to stand. Pt requires two persons assist with all t.f and mobility 2/2 safety.     Rehab Diagnosis: Severe lumbar stenosis with radiculopathy and spondylosis s/p TLIF L2-S1 decompression and fusion 8/19/2024. Pt. Has a past medical history of HTN, HLD, severe lumbar stenosis with lumbosacral radiculopathy and degenerative joint disease of the lumbar spine.    General Precautions: Standard, fall     Orthopedic Precautions: spinal precautions     Braces: LSO    Rehab Prognosis: Poor; patient would benefit from acute skilled PT services to address these deficits and reach maximum level of function.      History:     Past Medical History:   Diagnosis Date    Anxiety     Chronic right-sided low back pain     Enlarged prostate     Hyperlipidemia     " "Hypertension     Lumbosacral radiculopathy due to degenerative joint disease of spine     Neuritis or radiculitis due to rupture of lumbar intervertebral disc     Numbness and tingling of both feet     Numbness and tingling of both legs     Obesity     Other spondylosis with radiculopathy, lumbar region        Past Surgical History:   Procedure Laterality Date    BACK SURGERY      COLONOSCOPY      HERNIA REPAIR      SHOULDER SURGERY Left     TRANSFORAMINAL LUMBAR INTERBODY FUSION (TLIF) USING COMPUTER-ASSISTED NAVIGATION Left 8/19/2024    Procedure: FUSION, SPINE, LUMBAR, TLIF, USING COMPUTER-ASSISTED NAVIGATION;  Surgeon: Car August MD;  Location: [facility];  Service: Neurosurgery;  Laterality: Left;  L2 - S1 TRANSFORAMINAL INTERBODY FUSION / POSTERIOR LATERAL FUSION // PRONE PEDRO // DRILL // MICROSCOPE // O-ARM // DIRECT SPINE // NDM       Subjective     Patient Goal: " I want out of here."    Patient Comments: "I've called [facility]ni and [facility] about this place."    Patients cultural, spiritual, Church conflicts given the current situation: no       Living Environment  People in Home:  (Pt. reports he used to live with eldest daughter and wife but will be staying with sister upon d/c)  Living Arrangements: house, other (see comments) (Pt. reports he used to live with eldest daughter and wife but will be staying with sister upon d/c)  Home Accessibility: stairs within home (Simultaneous filing. User may not have seen previous data.)  Number of Stairs, Main Entrance: none (just a threshold)  Stairs, Within Home, Primary:  (a flight)  Number of Stairs, Within Home, Primary: other (see comments) (flight of stairs)  Home Layout: Able to live on 1st floor  Living Environment Comment:  (Pt. reports he used to live with eldest daughter and wife but will be staying with sister upon d/c)  Equipment Currently Used at Home: none (patient reports no DME or AD)      Objective:     Communicated with RN prior to session. "  Patient found up in chair with peripheral IV  upon PT entry to room.      Respiratory Status: Room air    Exams  Cognitive Exam:  Patient is oriented to Person, Place, Time, and Situation  Sensation:          -       reports numbness and tingling throughout BLE  RLE Strength:          -       WFL  LLE Strength:          -       WFL    Functional Mobility      GGs   Admit Current   Status Goal   Functional Area: Care Score: Care Score: Care Score:   Roll Left and Right 88 4  VC for proper spinal pxns  Independent   Sit to Lying 4 4  Constant VC for spinal pxns  Independent   Lying to Sitting on Side of Bed 88 4  Constant VC for spinal pxns  Independent   Sit to Stand 3 4  VC for hand placement with RW. Pt demos limited carry over Independent   Chair/Bed-to-Chair Transfer 3 4  SBA with RW  Independent   Walk 10 Feet 4 1 Independent   Walk 50 Feet with Two Turns 4 1 Independent   Walk 150 Feet 88 1  ~183 overall CGA of one and CGA of another with w/c close behind for safety. Pt requires VC to keep RW close to self. No Knee buckling noted. Pt refused B knee ace wraps  Independent   Wheel 50 Feet with Two Turns 9 5 Not applicable   Wheel 150 Feet 9 5  200 BUE propulsion  Not applicable     Therapeutic Activities and Exercises:  Patient educated on role of acute care PT and PT POC, safety while in hospital including calling nurse for mobility, and call light usage  Patient educated about importance of OOB mobility and remaining up in chair most of the day.        Activity Tolerance: Good    Patient left up in chair with all lines intact and call button in reach.    Education Provided: roles and goals of PT/PTA, transfer training, bed mob, gait training, balance training, safety awareness, and spinal precautions    Expected compliance: High compliance      Plan:     During this hospitalization, patient to be seen 5 x/week to address the identified rehab impairments via gait training, therapeutic activities, therapeutic  exercises and progress toward the following goals:    GOALS:   Multidisciplinary Problems       Physical Therapy Goals          Problem: Physical Therapy    Goal Priority Disciplines Outcome Goal Variances Interventions   Physical Therapy Goal     PT, PT/OT Progressing     Description: Bed Mobility:  Roll left and right independently.   Sit to supine transfer independently.   Supine to sit transfer independently.     Transfers:  Sit to stand transfer independently using RW.   Bed to chair transfer independently Stand Step  using RW.   Car transfer with supervision/touching assist using RW.    an object from the ground in standing position with supervision/touching assist using RW.     Mobility:  Ambulate 150 feet with supervision/touching assist using RW.  Ambulate 10 feet on uneven surfaces/ramps with supervision/touching assist using RW.   Pt ascended/descended a 4 inch curb with supervision/touching assist using RW.   Ascend/descend 4 stairs with supervision/touching assist using bilateral handrails.                          Plan of Care Expires:  09/14/24  PT Next Visit Date: 09/19/24  Plan of Care reviewed with: patient      PT Care conference held with PTA. Short term goals updated appropriately. Plan of care updates discussed and reviewed with PTA Daily Razo    Additional Infomation:           Time Tracking:     Therapy Time   PT Received On: 09/13/24  PT Start Time: 1300  PT Stop Time: 1400  PT Total Time (min): 60 min  PT Individual: 60  Missed Time:    Time Missed due to:      Billable Minutes: Re-eval 30 and Therapeutic Activity 30    09/13/2024

## 2024-09-13 NOTE — PROGRESS NOTES
Dos 9/13/24  Called because patient is adamant about leaving [facility]  Explained to him and his wife his therapeutic needs and that I did not believe leaving would be in his best interests  Explained ramifications of leaving AMA  He told me he would just leave here and go to another hospital  Despite my best attempts, patient insistent on leaving and wife is in agreement  Discussed with nursing staff    Subjective  HPI:  58 yo WM with a PMH of HTN, HLD, severe lumbar stenosis with lumbosacral radiculopathy and degenerative joint disease of lumbar spine was admitted to [facility] on 8/19/24, and underwent a TLIF L2-S1 decompression and fusion by Dr. August. Drain was placed to site. Patient placed on spinal precautions with LSO brace. On 8/20, PT/OT evals completed with impairments noted of weakness, impaired balance, orthopedic precautions, pain, impaired self care skills, impaired endurance, and impairedfunctional mobility. Labs showed low RBC of 3.09, low H&H of 9.8 & 27.6, low potassium of 3.0, elevated glucose of 118, low calcium of 7.8. On 8/21, patient complained of right posterior leg pain which worsened with sitting. TRAVIS drain remained in place. Decadron was increased. On 8/22, patient complained of continued leg pain and numbness so CT of lumbar spine was completed which showed postoperative changes without evidence of acute abnormality. Patient had continued buckling of BLE with therapy noted with high risk for falls. On 8/23, TRAVIS drain had 20ml output so drain was removed. Patient reported depression due to current situation so elijah was consulted. On 8/24, psych was consulted for depression and anxiety with recommendation to continue Effexor XR 300mg daily, continue seroquel 300mg at bedtime, added Trileptal 150mg BID, added Ambien PRN, and added Hydroxyzine 25mg TID PRN intense anxiety episodes. Patient will also need outpatient followup with therapist. On 8/25, patient fell after using restroom, Dr August was  notified of fall, and Xrays ordered. On 8/26, Lumbar spine XR showed Postoperative changes of posterior fusion with intact hardware. Sacrum XR showed No fractures or dislocations are clearly identified, Degenerative changes. Patient complained of back pain rating at 10 on 1-10 scale. Left knee XR showed no acute abnormalities seen. On 8/27, Cervical XR showed Limited exam with some degenerative changes and anterolisthesis as described above but essentially nondiagnostic other imaging modalities and or repeat exam is suggested. On 8/28, Patient BP uncontrolled so hospitialist was consulted for hypertension management. Patient was started on Metoprolol 50mg PO BID. On 8/29, BP remained elevated so increased Lisinopril to 40mg, changed Metoprolol to Carvedilol, and continued HCTZ. Lyrica was also added due to uncontrolled pain, and side effects of previously trialed gabapentin. On 8/30, WBC elevated so ordered Chest XR which was negative for acute findings. D-Dimer was elevated at 0.55, low Na noted at 134. On 9/1, labs showed elevated WBC of 12.40, low H&H of 9.9 & 30.1, low NA of 135, elevated glucose of 126, low calcium of 8.2. On 9/2, MRI of cervical spine was completed with No acute traumatic findings identified; Degenerative listhesis at C4-5 and C6-7. On 9/4, neurosurgeon reviewed MRI with no surgical intervention planned, OK to continue to mobilize, and OK transfer to rehab. Patient is AAOx4.  Participating with therapy. Functional status includes minimal assist needed for transfers with RW, walked 50ft with RW at minimal to moderate assist with episode of knee buckling noted, setup assist for grooming while seated, modified independent for toilet hygiene but required moderate assist for clothing management, minimal assist for lower body dressing and modified independent for socks with reacher, and setup for eating.Patient was evaluated, accepted, and admitted to inpatient rehab to improve functional status.  Transferred to [facility] on 9/4 without incident.     9/13: Seen with PT, Amb w/RW with therapy x 2 and following closely behind with . Discussed patient history related to leg weakness and obtaining EMG from Dr. August's office on Monday for review as they were already closed this afternoon. Psych NP relayed that patient still in a Manic state today and medication adjustments were being made. Concern with higher doses of Lithium 2/2 hyponatremia. Patient mobilizing in  back to his room and requests that I join him so he can explain everything that has happened to him here. Tells me about the night nurse only offering him aspirin when he was in tremendous pain and had already taken pain medication. Denies that he meant Tylenol, but ASA is not an order on PRN list of a post-op patient. Says that it took 3 hours for nurse to return with shot (Toradol) that she had called to get an order for. Blaming the nurse for purposely waiting so long to call and get additional medication. He is rambling through events for about an hour while I listened. States that another day he was left on the commode and was relentlessly pressing the call bell for 45 minutes before someone came, so he started beating his walker against the wall to get someone's attention. Tells me that the  called his wife accusing her of bringing him alcohol. He is stating that he believes the  has made this up to keep him from getting into another Rehab facility when in fact she had been working on fulfilling his wishes for transfer to [place]. He called his wife via [employer] so she could explain her side of the story. I calmly listened to her and her daughter as well. She did ask him to stop cursing and calm down. He asked her where she was and why she was not here, and she responded that she would be coming at 4 which is when visiting hours began. He told her that she needed to come now. They all 3 repeatedly said that he was being  accused of drinking alcohol out of a brown bottle. I relayed that I heard that in his handled cup, brown liquid was found that smelled like whiskey. This is in fact what the night nurse and the therapy tech told me. They were unable to locate any liquor bottle. CNA had mentioned that his wife had called ahead prior to that and asked if the grocery bags brought to him would be checked. He immediately flipped on me and became hostile, saying that I was accusing him of drinking too and he was going to javed us all for slander. I tried to reassure him that I was not accusing him and I can see that he only has water in said cup currently, but he had made up his mind. His wife and daughter were still on the phone. He said that he wanted out of this hospital immediately. I told him I would go check into his insurance status on approving his transfer. Relayed events to Nursing Manager, Housing Supervisor, and Physiatrist. Patient began pressing the code blue button for assistance. Patient did relay to me that he was not diagnosed Bipolar but only Anxiety and continued to use finger quotes when saying bipolar medication during the conversation. It was relayed to me that patient decided to sign AMA form and leave with his wife and daughter. VSSAF with noted HTN during Marie and agitation.             Review of Systems  Psychiatric: Has a history of bipolar disorder, anxiety, depression, and claustrophobia.      Depression/Anxiety:  denies depression. States that he takes Seroquel every day. Home psych medications reviewed, continued,  and Clonazepam added PRN Anxiety (home med).    DC QUEtiapine tablet 400 mg qHS.   lithium capsule 300 mg q12h  ADD OLANZapine tablet 10 mg qd  diazePAM tablet 5 mg q8h PRN Anxiety  Pain: back pain radiating down hips and legs, numbness/tingling to BLE     acetaminophen tablet 650 mg TID  baclofen tablet 10 mg TID. QID  dexAMETHasone tablet 2 mg q12h  pregabalin capsule 75 mg TID  acetaminophen  tablet 650 mg q8h PRN mild pain  oxyCODONE immediate release tablet 5 mg q4h PRN mod pain  oxyCODONE immediate release tablet 10 mg q4h PRN severe pain  Bowels/Bladder: last BM 9/12 x 2    Appetite:  good   Sleep: fair  DC QUEtiapine tablet 400 mg qHS.   hydrOXYzine pamoate capsule 50 mg qHS PRN Insomnia          Physical Exam  General: well-developed, well-nourished, manic and agitated  Respiratory: equal chest rise, no SOB, no audible wheeze  Cardiovascular: regular rate and rhythm, no edema  Gastrointestinal: soft, non-tender, non-distended   Musculoskeletal: BLE weakness  Integumentary: no rashes or skin lesions present  Neurologic: cranial nerves intact, lumbar radiculopathy  *MD performed and documented physical examination               Assessment/Plan  Hospital   Neuritis or radiculitis due to rupture of lumbar intervertebral disc   Other spondylosis with radiculopathy, lumbar region   Lumbosacral radiculopathy due to degenerative joint disease of spine   Radiculopathy of sacral region   S/P lumbar spinal fusion     Non-Hospital   Chronic midline low back pain without sciatica   Benign prostatic hyperplasia with nocturia   Claudication   Cluster B personality disorder   External hemorrhoid, bleeding   Former smoker, stopped smoking many years ago   ADALI (generalized anxiety disorder)   History of bipolar disorder   History of colon polyps   Essential hypertension   Hyperlipidemia   IFG (impaired fasting glucose)   Palpitations   Polyarthralgia   Prediabetes   Tobacco use disorder, moderate, in sustained remission       Wounds: back [id number]/I  S/p TLIF L2-S1 decompression and fusion on 8/19, by Dr. August. Drain was placed to site  Precautions: spinal  Bracing: LSO  Swallowing: Regular Diet (Low Sodium)  Function: Tolerating therapy. Continue PT/OT  VTE Prophylaxis:   enoxaparin injection 40 mg SubQ q24h  Code Status: FULL CODE   Discharge: Plans to live with his daughter in [place] in  a single-story home with 1 threshold step to enter the residence. Completed 9th grade. He has no  history. Prior to 7 months ago, pt. Was working full time offshore. Wife and their daughter manage the home, cook, grocery shop, do laundry, and clean.  Reports that he is  from his wife.  He stated that they split up and had to sell their home.  They were living in [place].  He will be going to live with one of his daughters in [place].  If that does not work out, he will move in with his sister in the [facility]. Children: (3). Date 9/13 Friday AMA.                Kelsey Gonsalez NP, conducted additional independent physical examination and assisted with medical documentation.

## 2024-09-13 NOTE — PT/OT/SLP PROGRESS
"Physical Therapy      Patient Name:  Lionel Nix   MRN:  [id number]    Patient not seen today secondary to Patient unwilling to participate.     Amount of therapy minutes missed:  30 Minutes.    Will follow-up as schedule allows.    Pt. Stated: "no, I'm not doing PT today. I'm about to check-out. I'm on the phone with my wife and then I'll be getting on the phone with an . So, no PT today, you can get out."    9/13/2024  "

## 2024-09-13 NOTE — PROGRESS NOTES
09/13/24 1030   Rec Therapy Time Calculation   Date of Treatment 09/13/24   Rec Start Time 1030   Rec Stop Time 1100   Rec Total Time (min) 30 min   Amount of Missed Time 30 Minutes   Missed Treatment Reason Other (Comment)  (Meeting with NP for assessment)

## 2024-09-13 NOTE — PROGRESS NOTES
[facility] ([facility])  Final Rehab Progress Note    Patient Name: Lionel Nix  MRN: [id number]  Age: 59 y.o. Sex: male  : 1965  Hospital Length of Stay: 9 days  Date of Service: 2024   Chief Complaint: Severe lumbar stenosis with radiculopathy and spondylosis s/p TLIF L2-S1 decompression and L3-S1 fusion on      Subjective:     Basic Information  Admit Information: 59-year-old male presented to [facility] on  for scheduled lumbar decompression and fusion. PMH significant for hypertension, hyperlipidemia, anxiety, depression.  On  tolerated TLIF L2-S1 decompression and L3-S1 fusion without perioperative complications.  On  discontinued TRAVIS drain.  Postop CT scan with good placement of hardware.  Psychiatry evaluated on .  Continued Effexor, Seroquel, and initiated Trileptal.  Continued with postoperative pain.  Lyrica initiated along with increase in hypertensive medications.  Reported fall on .  MRI cervical spine with no acute traumatic findings identified and degenerative listhesis at C4-5 and C6-7.  No neurosurgical interventions planned for C-spine. Tolerated transfer to [facility] inpatient rehab unit on  without incident.   Today's Information: No acute events overnight.  Sleep hygiene, bowel maintenance, and appetite at goal.  Last BM .  Vital signs at goal with no recorded fevers.  H&H trending up.  CBC unremarkable.  BMP unremarkable.  No new imaging today.    Review of patient's allergies indicates:   Allergen Reactions    Gabapentin Anxiety and Palpitations        Current Facility-Administered Medications:     acetaminophen tablet 650 mg, 650 mg, Oral, TID, Wallace, Kelsey A, FNP, 650 mg at 24 0538    acetaminophen tablet 650 mg, 650 mg, Oral, Q8H PRN, [place], Kelsey A, FNP    albuterol-ipratropium 2.5 mg-0.5 mg/3 mL nebulizer solution 3 mL, 3 mL, Nebulization, Q4H PRN, Carter Jha A, FNP, 3 mL at 24 1440     atorvastatin tablet 40 mg, 40 mg, Oral, QHS, Carter Jha, FNP, 40 mg at 09/12/24 2149    baclofen tablet 10 mg, 10 mg, Oral, QID, Devang Cheung Sr., MD, 10 mg at 09/13/24 0538    benzonatate capsule 100 mg, 100 mg, Oral, TID PRN, Carter Jha, FNP, 100 mg at 09/10/24 1401    carvediloL tablet 12.5 mg, 12.5 mg, Oral, BID, Alivia Garcia FNP, 12.5 mg at 09/13/24 0820    cetirizine tablet 10 mg, 10 mg, Oral, After dinner, Kelsey Gonsalez FNP, 10 mg at 09/12/24 1803    dexAMETHasone tablet 2 mg, 2 mg, Oral, Q12H, Alivia Garcia FNP, 2 mg at 09/13/24 0820    diazePAM tablet 5 mg, 5 mg, Oral, Q8H PRN, Kelsey Gonsalez FNP, 5 mg at 09/13/24 0820    docusate sodium capsule 100 mg, 100 mg, Oral, BID, Carter Jha, FNP, 100 mg at 09/13/24 0820    enoxaparin injection 40 mg, 40 mg, Subcutaneous, Q24H (prophylaxis, 1700), Carter Jha, FNP, 40 mg at 09/12/24 1804    fluticasone propionate 50 mcg/actuation nasal spray 100 mcg, 2 spray, Each Nostril, Daily, Kelsey Gonsalez FNP, 100 mcg at 09/11/24 0845    hydrALAZINE injection 10 mg, 10 mg, Intravenous, Q4H PRN, Carter Jha, FNP    hydroCHLOROthiazide tablet 25 mg, 25 mg, Oral, Daily, Alivia Garcia FNP, 25 mg at 09/13/24 0820    HYDROcodone-acetaminophen 5-325 mg per tablet 1 tablet, 1 tablet, Oral, Daily PRN, Carter Jha FNP, 1 tablet at 09/11/24 0842    labetalol 20 mg/4 mL (5 mg/mL) IV syring, 10 mg, Intravenous, Q4H PRN, Carter Jha FNP, 10 mg at 09/06/24 2149    lisinopriL tablet 40 mg, 40 mg, Oral, Daily, Alivia Garcia FNP, 40 mg at 09/13/24 0820    lithium capsule 300 mg, 300 mg, Oral, Q12H, Virgil Chaudhari NP, 300 mg at 09/13/24 0820    metoprolol injection 10 mg, 10 mg, Intravenous, Q2H PRN, Carter Jha FNP    nitroGLYCERIN SL tablet 0.4 mg, 0.4 mg, Sublingual, Q5 Min PRN, Carter Jha FNP    ondansetron disintegrating tablet 8 mg, 8 mg, Oral, Q8H PRN,  "Carter Jha, FNP    ondansetron injection 4 mg, 4 mg, Intravenous, Q8H PRN, Carter Jha, FNP    oxyCODONE immediate release tablet 10 mg, 10 mg, Oral, Q4H PRN, Kelsey Gonsalez, FNP, 10 mg at 09/13/24 0538    oxyCODONE immediate release tablet 5 mg, 5 mg, Oral, Q4H PRN, Kelsey Gonsalez, FNP    polyethylene glycol packet 17 g, 17 g, Oral, BID PRN, Carter Jha, FNP, 17 g at 09/07/24 0926    pregabalin capsule 75 mg, 75 mg, Oral, TID, Kelsey Gonsalez FNP, 75 mg at 09/13/24 0538    QUEtiapine tablet 400 mg, 400 mg, Oral, Nightly, Virgil Chaudhari NP, 400 mg at 09/12/24 2149    tamsulosin 24 hr capsule 0.4 mg, 0.4 mg, Oral, QHS, Carter Jha, FNP, 0.4 mg at 09/12/24 2149    vitamin renal formula (B-complex-vitamin c-folic acid) 1 mg per capsule 1 capsule, 1 capsule, Oral, Daily, Kelsey Gonsalez FNP, 1 capsule at 09/13/24 0820     Review of Systems   Complete 12-point review of symptoms negative except for what's mentioned in HPI     Objective:     /68   Pulse 91   Temp 97.5 °F (36.4 °C) (Oral)   Resp 18   Ht 5' 8" (1.727 m)   Wt 104.3 kg (229 lb 15 oz)   SpO2 96%   BMI 34.96 kg/m²        Physical Exam  Vitals reviewed.   Eyes:      Pupils: Pupils are equal, round, and reactive to light.   Cardiovascular:      Rate and Rhythm: Normal rate and regular rhythm.      Heart sounds: Normal heart sounds.   Pulmonary:      Effort: Pulmonary effort is normal.      Breath sounds: Normal breath sounds.   Abdominal:      General: Bowel sounds are normal.   Musculoskeletal:         General: Normal range of motion.   Skin:     General: Skin is warm.      Comments: Lumbar surgical incision dry and intact    Neurological:      General: No focal deficit present.      Mental Status: He is alert and oriented to person, place, and time.      Motor: Weakness present.   Psychiatric:         Mood and Affect: Mood is anxious.         Speech: Speech is rapid and pressured.         " Behavior: Behavior is aggressive.         Cognition and Memory: Cognition normal.         Judgment: Judgment is impulsive.     *MD performed and documented physical examination       Lines/Drains/Airways       None                   Labs  Recent Results (from the past 24 hour(s))   Basic Metabolic Panel    Collection Time: 09/13/24  5:47 AM   Result Value Ref Range    Sodium 137 136 - 145 mmol/L    Potassium 4.2 3.5 - 5.1 mmol/L    Chloride 100 98 - 107 mmol/L    CO2 28 23 - 31 mmol/L    Glucose 136 (H) 82 - 115 mg/dL    Blood Urea Nitrogen 12.2 8.4 - 25.7 mg/dL    Creatinine 0.68 (L) 0.73 - 1.18 mg/dL    BUN/Creatinine Ratio 18     Calcium 9.6 8.8 - 10.0 mg/dL    Anion Gap 9.0 mEq/L    eGFR >60 mL/min/1.73/m2   CBC with Differential    Collection Time: 09/13/24  5:51 AM   Result Value Ref Range    WBC 9.00 4.50 - 11.50 x10(3)/mcL    RBC 3.80 (L) 4.70 - 6.10 x10(6)/mcL    Hgb 11.4 (L) 14.0 - 18.0 g/dL    Hct 35.0 (L) 42.0 - 52.0 %    MCV 92.1 80.0 - 94.0 fL    MCH 30.0 27.0 - 31.0 pg    MCHC 32.6 (L) 33.0 - 36.0 g/dL    RDW 13.1 11.5 - 17.0 %    Platelet 179 130 - 400 x10(3)/mcL    MPV 8.7 7.4 - 10.4 fL    Neut % 84.3 %    Lymph % 9.6 %    Mono % 5.7 %    Eos % 0.0 %    Basophil % 0.1 %    Lymph # 0.86 0.6 - 4.6 x10(3)/mcL    Neut # 7.59 2.1 - 9.2 x10(3)/mcL    Mono # 0.51 0.1 - 1.3 x10(3)/mcL    Eos # 0.00 0 - 0.9 x10(3)/mcL    Baso # 0.01 <=0.2 x10(3)/mcL    IG# 0.03 0 - 0.04 x10(3)/mcL    IG% 0.3 %    NRBC% 0.0 %     Radiology   CT lumbar spine without contrast on 09/12/2024, IMPRESSION:  No acute fracture identified.  Small perihardware lucency around the right S1 screw with otherwise stable postoperative changes.  Radiology   Lumbar XR on 09/07/2024, IMPRESSION:  Postsurgical changes with no adverse interval change appreciated.    Radiology   Cervical XR on 09/07/2024, IMPRESSION:  No adverse interval change appreciated.  The lobar cervical spine is not well visualized.  Radiology  MRI cervical spine 9/4:  Impression: No acute traumatic findings identified. Degenerative listhesis at C4-5 and C6-7.   Radiology  X-ray lumbar spine 8/26: Impression: Postoperative changes of posterior fusion with intact hardware     Assessment/Plan:     59 y.o. WM admitted on 9/4/2024     Severe lumbar stenosis with radiculopathy and spondylosis   - s/p TLIF L2-S1 decompression and L3-S1 fusion on 08/19  - LSO brace when OOB, spinal precautions  - continue                 Dexamethasone 2 mg b.i.d. (continue taper)                 Lyrica 75 mg t.i.d.                 Baclofen 10 mg q.i.d. (increased 9/10)  - follow-up with Neurosurgery outpatient     Bipolar disorder  - manic overnight, improved this morning  - discontinued Venlafaxine 300 mg daily on 9/10  - discontinued Oxcarbazepine 150 mg b.i.d. on 9/10  - discontinued Lamictal 25 mg daily on 09/11  - discontinued Seroquel 400 mg at bedtime on 09/13  - continue               Lithium 300 mg b.i.d. (initiated 9/11)                             Olanzapine 10 mg at bedtime (initiated 9/13     Valium 5 mg b.i.d. p.r.n. anxiety (initiated 9/6)  - follow-up with psychiatrist outpatient     D  - [facility] outpatient  - continue                Atorvastatin 40 mg at night      HTN  - blood pressure at goal  - continue                Coreg 12.5 mg b.i.d.                  Hydrochlorothiazide 25 mg daily                  Lisinopril 40 mg daily                 Hydralazine 10 mg every 2 hours as needed for BP > 160/90                Labetalol 10 mg every 2 hours as needed for BP > 160/90  - low sodium diet     Constipation  - stable  - continue                 Colace 100 mg b.i.d.      BPH  - stable  - continue                 Tamsulosin 0.4 mg at night    Hyponatremia  - sodium 130-trending up  - Trileptal discontinued due to hyponatremia on 09/09  - encourage electrolyte drinks     VTE Prophylaxis:  Lovenox 40 mg daily     POA: no  Living will: no  Contacts: Clemencia Nix (daughter) [phone]                      Dee Dee (sister) [phone]     CODE STATUS: Full  Internal Medicine (attending): Thor King MD  Physiatry (consulting):  Devang Cheung MD     OUTPATIENT PROVIDERS  PCP: Rafy Poole MD   Neurosurgery:  Car August MD   Psychiatry: Josef Sutton MD in [place], [place]     DISPOSITION:  Sleep hygiene and bowel maintenance at goal.  Vital signs at goal with no recorded fevers.  Lab work unremarkable.  No new imaging today.  Psychiatry evaluated today and discontinued Seroquel.  Olanzapine 10 mg at bedtime initiated.  Continue lithium 300 mg q.12 hours.  Refusing therapy and requesting to leave AMA.      Staffing 9/9/2024: Continent of bowel and bladder. RT: Overall min assist.  Impulsive with poor safety. PT: Overall SBA.  Ambulating 50 feet partial mod to total assist. Poor carry over and safety awareness.  Needs constant verbal cueing. OT: Minimal progress. Complains of pain with medication.  Requires frequent redirection. Overall mod assist with ADLs. ST: Impaired cognition 2/2 bipolar. Projected discharge pending.     Jake Jha NP conducted independent physical examination and assisted with medical documentation.    Total time spent on this encounter including chart review and direct MD + NP 1-on-1 patient interaction: 51 minutes   Over 50% of this time was spent in counseling and coordination of care

## 2024-09-13 NOTE — PLAN OF CARE
Pt evidently refusing therapy this a.m. because he is on the phone trying to get his insurance company to approve the transfer to [facility] (per OT Carolann).      Left message for [facility]'s Freida ([phone]) to try to get an update on the insurance approval of the lateral tx.  Will await response.    1001:  Attempted to visit pt while Jake NP, was rounding, but pt has now decided he does not want me in his room, just as he has with multiple other staff members.  Ranting about calling [facility] and others in positions of power to report us.  I asked if he wanted an update on his tx request ([place]), and he denied and told me to get out of his room.     Manic and angry.  According to staff, he is paranoid that I am talking poorly about him to try to prevent his transition to [facility].      1354:  Sent another message (via [employer]) @ 1232 to [facility] requesting an update on insurance approval earlier this afternoon and have had no response thus far.

## 2024-09-13 NOTE — PT/OT/SLP PROGRESS
Pt was scheduled from [id number] but  was not seen for Recreation Therapy due to meeting with Psychiatric NP

## 2024-09-13 NOTE — PLAN OF CARE
Problem: Wound  Goal: Improved Oral Intake  Outcome: Progressing  Intervention: Promote and Optimize Oral Intake  Flowsheets (Taken 9/12/2024 1958)  Nutrition Interventions: food preferences provided  Goal: Skin Health and Integrity  Outcome: Progressing  Intervention: Optimize Skin Protection  Flowsheets (Taken 9/12/2024 1958)  Activity Management: Up in chair - L3

## 2024-09-13 NOTE — NURSING
Pt wanting to leave AMA informed pt of risk/harm of leaving, pt still wanting to leave says he feels unsafe in here, informed Dr. Cheung of pts decision, he went in and talked to pt and wife informed pt and wife of risk of leaving AMA, pt still wanting to leave. Had pt sign AMA form pt took paper out of my hand and took picture of paper informed pt if he needed copy he would have to go to medical records, he said its his right. Pt wheeled down to car via maria esther WILLIAMSON, .

## 2024-09-16 NOTE — PT/OT/SLP DISCHARGE
Recreational Therapy Discharge      Date of Treatment: 09/13/24  Start Time: 1030  Stop Time: 1100  Total Time: 30 min  Missed Time    Assessment      Lionel Nix is a 59 y.o. male admitted with a medical diagnosis of S/P lumbar spinal fusion.  He presents with the following impairments/functional limitations:  weakness, impaired functional mobility, gait instability, impaired cognition, decreased coordination, decreased lower extremity function, decreased safety awareness .    Rehab Diagnosis:     Recent Surgery:    General Precautions: Standard, fall     Orthopedic Precautions:spinal precautions     Braces: LSO    Rehab Prognosis:  Refused to participate ; patient would benefit from acute skilled Recreational Therapy services to address these deficits and reach maximum level of function.      Impairments: Balance deficits, Decreased knowledge of condition, Endurance deficits, Mobility deficits, Safety awareness deficits, and Strength deficits  Rehab Potential: Guarded; due to: Refusal to participate  Treatment Recommendations: Complete discharge plan  Treatment Diagnosis: Severe lumbar stenosis with radiculopathy and spondylosis, TLIF L2-S1 decompression and fusion, HTN, HLD, lumbosacral raadiculopathy and degenerative joint disease of the lumbar spine  Orientation: Oriented x4  Affect/Behavior: Agitated, Impulsive, Uncooperative, and Distracted  Safety/Judgement: impaired   Basic Command Following: impaired  Spiritual Cultural: no        History     Past Medical History:   Diagnosis Date    Anxiety     Chronic right-sided low back pain     Enlarged prostate     Hyperlipidemia     Hypertension     Lumbosacral radiculopathy due to degenerative joint disease of spine     Neuritis or radiculitis due to rupture of lumbar intervertebral disc     Numbness and tingling of both feet     Numbness and tingling of both legs     Obesity     Other spondylosis with radiculopathy, lumbar region        Past Surgical History:  "  Procedure Laterality Date    BACK SURGERY      COLONOSCOPY      HERNIA REPAIR      SHOULDER SURGERY Left     TRANSFORAMINAL LUMBAR INTERBODY FUSION (TLIF) USING COMPUTER-ASSISTED NAVIGATION Left 8/19/2024    Procedure: FUSION, SPINE, LUMBAR, TLIF, USING COMPUTER-ASSISTED NAVIGATION;  Surgeon: Car August MD;  Location: [facility];  Service: Neurosurgery;  Laterality: Left;  L2 - S1 TRANSFORAMINAL INTERBODY FUSION / POSTERIOR LATERAL FUSION // PRONE PEDRO // DRILL // MICROSCOPE // O-ARM // DIRECT SPINE // NDM       Home Environment     Admit Date: 09/05/24  Living Situation  People in Home:  (Pt. reports he used to live with eldest daughter and wife but will be staying with sister upon d/c)  Lives in: house  Patients Responsibilities: Caregiver to pet, Community mobility, , Financial management, Leisure/play/hobbies, Employed  Number of Children: 3  Occupation:[employer]    Instrumental Activities of Daily Living     Previous Hand Dominance: Right Current Hand Dominance: Right     Other iADL Information:        Cognitive Skills Building         Cognitive Observation Activity Assist Position Equipment Response            Comment:      Dynamic Activities      Activity Assist Position Equipment Response           Comment:        Fine Motor Activities      Activity Assist Position Equipment Response           Comment:        Goals     Patient Goals  Patient Goal 1: "To be able to walk and go on with life again."    Short Term Goals    Goal  Goal Status   Will increase sit to stand to supervision Progressing   Will improve dynamic standing balance/reaching to supervision Progressing                 Long Term Goals    Goal Goal Status   Will increase standing tolerance to 5 minutes Met   Will improve dynamic standing balance/reaching to setup Progressing                     Plan       Patient to be seen: Daily  Duration: 1 day  Treatments planned: Balance training, Coordination, Energy conservation training, " Safety education  Treatment plan/goals established with Patient/Caregiver: Yes

## 2024-09-16 NOTE — PLAN OF CARE
"Pt ended up leaving AMA from [facility] on 9/13 evening after I had left for the day.    Just received word from [facility] that they received insurance approval for his tx and were requesting assistance in tx'g him there.  I sent them his recent notes (through 9/13) but had to explain that the patient left AMA on 9/13 evening and that they would have to coordinate with pt/family for his transition there from home. She agreed with me sending additional records and said that they will "figure out" how they will handle it from here.  Sent updates via [employer] as requested.  "

## (undated) DEVICE — DRAPE O-ARM STERILE

## (undated) DEVICE — DRAPE ORTH SPLIT 77X108IN

## (undated) DEVICE — BOWL STERILE LARGE 32OZ

## (undated) DEVICE — DRAIN ROUND SUCTION 10FR

## (undated) DEVICE — DRAPE TOP 53X102IN

## (undated) DEVICE — ELECTRODE BLD EXT 6.50 ST DISP

## (undated) DEVICE — KIT POS JACKSON TABLE NO HDRST

## (undated) DEVICE — NDL HYPO 22GX1 1/2 SYR 10ML LL

## (undated) DEVICE — PROBE BALL TIP 2.3MM

## (undated) DEVICE — SUT VICRYL PLUS 3-0 X-1 18IN

## (undated) DEVICE — COVER HD BACK TABLE 6FT

## (undated) DEVICE — ELECTRODE BLADE E-Z CLEAN 4IN

## (undated) DEVICE — GLOVE PROTEXIS BLUE LATEX 8

## (undated) DEVICE — SHEET AVIENE MICFIB 1.4X1.4IN

## (undated) DEVICE — PAD DERMAPROX XL 22X14X.5IN

## (undated) DEVICE — PREP CARTRIDGE

## (undated) DEVICE — Device

## (undated) DEVICE — TRAY CATH FOL SIL URIMTR 16FR

## (undated) DEVICE — BUR BONE CUT MICRO TPS 3X3.8MM

## (undated) DEVICE — GLOVE PROTEXIS LTX MICRO  7.5

## (undated) DEVICE — TIP KERRISON RONGEUR SHARP 4MM

## (undated) DEVICE — SUT VICRYL PLUS 0 CT-1 18IN

## (undated) DEVICE — DRESSING TELFA N ADH 3X8

## (undated) DEVICE — SPHERE NDI PASSIVE

## (undated) DEVICE — TIP KERRISON RONGEUR SHARP 3MM

## (undated) DEVICE — PILLOW HEAD REST

## (undated) DEVICE — SOL NACL IRR 1000ML BTL

## (undated) DEVICE — ELECTRODE PATIENT RETURN DISP

## (undated) DEVICE — SUT BONE WAX 2.5 GRMS 12/BX

## (undated) DEVICE — BLADE MILL+ BONE MEDIUM DISP

## (undated) DEVICE — INSTRUMENT FRAZIER 10FR W/VENT

## (undated) DEVICE — RESERVOIR JACKSON-PRATT 100CC

## (undated) DEVICE — BLADE EZ CLEAN 2 1/2

## (undated) DEVICE — GOWN X-LG STERILE BACK

## (undated) DEVICE — STRIP MEDI WND CLSR 1/2X4IN

## (undated) DEVICE — DISH PETRI MED 3.5IN

## (undated) DEVICE — DRAPE SURG W/TWL 17 5/8X23

## (undated) DEVICE — 5.5MM BONE TAP

## (undated) DEVICE — DRAPE OPMI STERILE

## (undated) DEVICE — KIT SURGICAL TURNOVER

## (undated) DEVICE — ADHESIVE MASTISOL VIAL 48/BX

## (undated) DEVICE — SYR IRRIGATION BULB STER 60ML

## (undated) DEVICE — SUT VICRYL 4-0 18 P-3

## (undated) DEVICE — KIT SURGIFLO HEMOSTATIC MATRIX

## (undated) DEVICE — APPLICATOR CHLORAPREP ORN 26ML

## (undated) DEVICE — TUBING SILICON CLR 3/16IN 10FT